# Patient Record
Sex: MALE | Race: WHITE | NOT HISPANIC OR LATINO | Employment: OTHER | URBAN - METROPOLITAN AREA
[De-identification: names, ages, dates, MRNs, and addresses within clinical notes are randomized per-mention and may not be internally consistent; named-entity substitution may affect disease eponyms.]

---

## 2017-12-15 ENCOUNTER — OFFICE VISIT (OUTPATIENT)
Dept: URGENT CARE | Facility: CLINIC | Age: 62
End: 2017-12-15
Payer: MEDICARE

## 2017-12-15 VITALS
HEIGHT: 72 IN | OXYGEN SATURATION: 96 % | WEIGHT: 308 LBS | HEART RATE: 96 BPM | BODY MASS INDEX: 41.72 KG/M2 | TEMPERATURE: 98 F | SYSTOLIC BLOOD PRESSURE: 147 MMHG | RESPIRATION RATE: 16 BRPM | DIASTOLIC BLOOD PRESSURE: 89 MMHG

## 2017-12-15 DIAGNOSIS — J40 BRONCHITIS: Primary | ICD-10-CM

## 2017-12-15 PROCEDURE — 96372 THER/PROPH/DIAG INJ SC/IM: CPT | Mod: S$GLB,,, | Performed by: EMERGENCY MEDICINE

## 2017-12-15 PROCEDURE — 99203 OFFICE O/P NEW LOW 30 MIN: CPT | Mod: 25,S$GLB,, | Performed by: NURSE PRACTITIONER

## 2017-12-15 RX ORDER — AZITHROMYCIN 250 MG/1
TABLET, FILM COATED ORAL
Qty: 6 TABLET | Refills: 0 | Status: SHIPPED | OUTPATIENT
Start: 2017-12-15 | End: 2018-03-27 | Stop reason: CLARIF

## 2017-12-15 RX ORDER — PENTOXIFYLLINE 400 MG/1
400 TABLET, EXTENDED RELEASE ORAL 2 TIMES DAILY
Status: ON HOLD | COMMUNITY
End: 2018-11-27 | Stop reason: HOSPADM

## 2017-12-15 RX ORDER — ASPIRIN 81 MG/1
81 TABLET ORAL DAILY
Status: ON HOLD | COMMUNITY
End: 2018-11-27 | Stop reason: HOSPADM

## 2017-12-15 RX ORDER — ATORVASTATIN CALCIUM 40 MG/1
40 TABLET, FILM COATED ORAL DAILY
COMMUNITY

## 2017-12-15 RX ORDER — BACLOFEN 10 MG/1
10 TABLET ORAL 3 TIMES DAILY
COMMUNITY
End: 2018-11-15 | Stop reason: CLARIF

## 2017-12-15 RX ORDER — LOSARTAN POTASSIUM 50 MG/1
50 TABLET ORAL DAILY
COMMUNITY

## 2017-12-15 RX ORDER — HYDROCODONE BITARTRATE AND ACETAMINOPHEN 10; 325 MG/1; MG/1
TABLET ORAL EVERY 6 HOURS PRN
Status: ON HOLD | COMMUNITY
End: 2018-11-27 | Stop reason: HOSPADM

## 2017-12-15 RX ORDER — POTASSIUM CHLORIDE 750 MG/1
20 CAPSULE, EXTENDED RELEASE ORAL ONCE
COMMUNITY

## 2017-12-15 RX ORDER — METOPROLOL SUCCINATE 50 MG/1
50 TABLET, EXTENDED RELEASE ORAL 2 TIMES DAILY
COMMUNITY

## 2017-12-15 RX ORDER — BETAMETHASONE SODIUM PHOSPHATE AND BETAMETHASONE ACETATE 3; 3 MG/ML; MG/ML
9 INJECTION, SUSPENSION INTRA-ARTICULAR; INTRALESIONAL; INTRAMUSCULAR; SOFT TISSUE
Status: COMPLETED | OUTPATIENT
Start: 2017-12-15 | End: 2017-12-15

## 2017-12-15 RX ORDER — HYDROXYCHLOROQUINE SULFATE 200 MG/1
200 TABLET, FILM COATED ORAL 2 TIMES DAILY
COMMUNITY
End: 2019-04-23

## 2017-12-15 RX ORDER — CLOPIDOGREL 300 MG/1
75 TABLET, FILM COATED ORAL ONCE
Status: ON HOLD | COMMUNITY
End: 2018-11-27 | Stop reason: HOSPADM

## 2017-12-15 RX ADMIN — BETAMETHASONE SODIUM PHOSPHATE AND BETAMETHASONE ACETATE 9 MG: 3; 3 INJECTION, SUSPENSION INTRA-ARTICULAR; INTRALESIONAL; INTRAMUSCULAR; SOFT TISSUE at 09:12

## 2017-12-16 NOTE — PATIENT INSTRUCTIONS
Please return here or go to the Emergency Department for any concerns or worsening of condition.  If you were prescribed antibiotics, please take them to completion.  If you were prescribed a narcotic medication, do not drive or operate heavy equipment or machinery while taking these medications.  Please follow up with your primary care doctor or specialist as needed.    If you  smoke, please stop smoking.  Bronchitis with Wheezing (Viral or Bacterial: Adult)    Bronchitis is an infection of the air passages. It often occurs during a cold and is usually caused by a virus. Symptoms include cough with mucus (phlegm) and low-grade fever. This illness is contagious during the first few days and is spread through the air by coughing and sneezing, or by direct contact (touching the sick person and then touching your own eyes, nose, or mouth).  If there is a lot of inflammation, air flow is restricted. The air passages may also go into spasm, especially if you have asthma. This causes wheezing and difficulty breathing even in people who do not have asthma.  Bronchitis usually lasts 7 to 14 days. The wheezing should improve with treatment during the first week. An inhaler is often prescribed to relax the air passages and stop wheezing. Antibiotics will be prescribed if your doctor thinks there is also a secondary bacterial infection.  Home care  · If symptoms are severe, rest at home for the first 2 to 3 days. When you go back to your usual activities, don't let yourself get too tired.  · Do not smoke. Also avoid being exposed to secondhand smoke.  · You may use over-the-counter medicine to control fever or pain, unless another medicine was prescribed. Note: If you have chronic liver or kidney disease or have ever had a stomach ulcer or gastrointestinal bleeding, talk with your healthcare provider before using these medicines. Also talk to your provider if you are taking medicine to prevent blood clots.) Aspirin should  never be given to anyone younger than 18 years of age who is ill with a viral infection or fever. It may cause severe liver or brain damage.  · Your appetite may be poor, so a light diet is fine. Avoid dehydration by drinking 6 to 8 glasses of fluids per day (such as water, soft drinks, sports drinks, juices, tea, or soup). Extra fluids will help loosen secretions in the nose and lungs.  · Over-the-counter cough, cold, and sore-throat medicines will not shorten the length of the illness, but they may be helpful to reduce symptoms. (Note: Do not use decongestants if you have high blood pressure.)  · If you were given an inhaler, use it exactly as directed. If you need to use it more often than prescribed, your condition may be worsening. If this happens, contact your healthcare provider.  · If prescribed, finish all antibiotic medicine, even if you are feeling better after only a few days.  Follow-up care  Follow up with your healthcare provider, or as advised. If you had an X-ray or ECG (electrocardiogram), a specialist will review it. You will be notified of any new findings that may affect your care.  Note: If you are age 65 or older, or if you have a chronic lung disease or condition that affects your immune system, or you smoke, talk to your healthcare provider about having a pneumococcal vaccinations and a yearly influenza vaccination (flu shot).  When to seek medical advice  Call your healthcare provider right away if any of these occur:  · Fever of 100.4°F (38°C) or higher  · Coughing up increasing amounts of colored sputum  · Weakness, drowsiness, headache, facial pain, ear pain, or a stiff neck  Call 911, or get immediate medical care  Contact emergency services right away if any of these occur.  · Coughing up blood  · Worsening weakness, drowsiness, headache, or stiff neck  · Increased wheezing not helped with medication, shortness of breath, or pain with breathing  Date Last Reviewed: 9/13/2015  ©  7736-7252 [employer]. [address] [place] [address], [place] [address]. All rights reserved. This information is not intended as a substitute for professional medical care. Always follow your healthcare professional's instructions.

## 2017-12-16 NOTE — PROGRESS NOTES
Subjective:       Patient ID: Facundo Lord is a 62 y.o. male.    Vitals:  height is 6' (1.829 m) and weight is 139.7 kg (308 lb) (abnormal). His oral temperature is 98.1 °F (36.7 °C). His blood pressure is 147/89 (abnormal) and his pulse is 96. His respiration is 16 and oxygen saturation is 96%.     Chief Complaint: Cough    Cough   This is a recurrent problem. The current episode started in the past 7 days. The problem has been gradually worsening. The cough is productive of sputum. Associated symptoms include wheezing. Pertinent negatives include no chest pain, chills, ear pain, eye redness, fever, headaches, myalgias, sore throat or shortness of breath. Risk factors for lung disease include smoking/tobacco exposure. He has tried OTC cough suppressant for the symptoms. The treatment provided mild relief. His past medical history is significant for COPD and emphysema.     Review of Systems   Constitution: Negative for chills, fever and malaise/fatigue.   HENT: Positive for congestion. Negative for ear pain, hoarse voice and sore throat.    Eyes: Negative for discharge and redness.   Cardiovascular: Negative for chest pain, dyspnea on exertion and leg swelling.   Respiratory: Positive for cough, sputum production and wheezing. Negative for shortness of breath.    Musculoskeletal: Negative for myalgias.   Gastrointestinal: Negative for abdominal pain and nausea.   Neurological: Negative for headaches.       Objective:      Physical Exam   Constitutional: He is oriented to person, place, and time. He appears well-developed and well-nourished. He is cooperative.  Non-toxic appearance. He does not appear ill. No distress.   HENT:   Head: Normocephalic and atraumatic.   Right Ear: Hearing, tympanic membrane, external ear and ear canal normal.   Left Ear: Hearing, tympanic membrane, external ear and ear canal normal.   Nose: Nose normal. No mucosal edema, rhinorrhea or nasal deformity. No epistaxis. Right sinus  exhibits no maxillary sinus tenderness and no frontal sinus tenderness. Left sinus exhibits no maxillary sinus tenderness and no frontal sinus tenderness.   Mouth/Throat: Uvula is midline, oropharynx is clear and moist and mucous membranes are normal. No trismus in the jaw. Normal dentition. No uvula swelling. No posterior oropharyngeal erythema.   Eyes: Conjunctivae and lids are normal. No scleral icterus.   Sclera clear bilat   Neck: Trachea normal, full passive range of motion without pain and phonation normal. Neck supple.   Cardiovascular: Normal rate, regular rhythm, normal heart sounds, intact distal pulses and normal pulses.    Pulmonary/Chest: Effort normal. No respiratory distress. He has wheezes in the right upper field, the right lower field, the left upper field and the left lower field.   Abdominal: Soft. Normal appearance and bowel sounds are normal. He exhibits no distension. There is no tenderness.   Musculoskeletal: Normal range of motion. He exhibits no edema or deformity.   Neurological: He is alert and oriented to person, place, and time. He exhibits normal muscle tone. Coordination normal.   Skin: Skin is warm, dry and intact. He is not diaphoretic. No pallor.   Psychiatric: He has a normal mood and affect. His speech is normal and behavior is normal. Judgment and thought content normal. Cognition and memory are normal.   Nursing note and vitals reviewed.      Assessment:       1. Bronchitis        Plan:         Bronchitis    Other orders  -     betamethasone acetate-betamethasone sodium phosphate injection 9 mg; Inject 1.5 mLs (9 mg total) into the muscle one time.  -     azithromycin (ZITHROMAX Z-AYALA) 250 MG tablet; TAKE 2 TABLETS ON DAY 1, THEN 1 TABLET DAILY X 4 DAYS.  Dispense: 6 tablet; Refill: 0      Patient Instructions   Please return here or go to the Emergency Department for any concerns or worsening of condition.  If you were prescribed antibiotics, please take them to  completion.  If you were prescribed a narcotic medication, do not drive or operate heavy equipment or machinery while taking these medications.  Please follow up with your primary care doctor or specialist as needed.    If you  smoke, please stop smoking.  Bronchitis with Wheezing (Viral or Bacterial: Adult)    Bronchitis is an infection of the air passages. It often occurs during a cold and is usually caused by a virus. Symptoms include cough with mucus (phlegm) and low-grade fever. This illness is contagious during the first few days and is spread through the air by coughing and sneezing, or by direct contact (touching the sick person and then touching your own eyes, nose, or mouth).  If there is a lot of inflammation, air flow is restricted. The air passages may also go into spasm, especially if you have asthma. This causes wheezing and difficulty breathing even in people who do not have asthma.  Bronchitis usually lasts 7 to 14 days. The wheezing should improve with treatment during the first week. An inhaler is often prescribed to relax the air passages and stop wheezing. Antibiotics will be prescribed if your doctor thinks there is also a secondary bacterial infection.  Home care  · If symptoms are severe, rest at home for the first 2 to 3 days. When you go back to your usual activities, don't let yourself get too tired.  · Do not smoke. Also avoid being exposed to secondhand smoke.  · You may use over-the-counter medicine to control fever or pain, unless another medicine was prescribed. Note: If you have chronic liver or kidney disease or have ever had a stomach ulcer or gastrointestinal bleeding, talk with your healthcare provider before using these medicines. Also talk to your provider if you are taking medicine to prevent blood clots.) Aspirin should never be given to anyone younger than 18 years of age who is ill with a viral infection or fever. It may cause severe liver or brain damage.  · Your appetite  may be poor, so a light diet is fine. Avoid dehydration by drinking 6 to 8 glasses of fluids per day (such as water, soft drinks, sports drinks, juices, tea, or soup). Extra fluids will help loosen secretions in the nose and lungs.  · Over-the-counter cough, cold, and sore-throat medicines will not shorten the length of the illness, but they may be helpful to reduce symptoms. (Note: Do not use decongestants if you have high blood pressure.)  · If you were given an inhaler, use it exactly as directed. If you need to use it more often than prescribed, your condition may be worsening. If this happens, contact your healthcare provider.  · If prescribed, finish all antibiotic medicine, even if you are feeling better after only a few days.  Follow-up care  Follow up with your healthcare provider, or as advised. If you had an X-ray or ECG (electrocardiogram), a specialist will review it. You will be notified of any new findings that may affect your care.  Note: If you are age 65 or older, or if you have a chronic lung disease or condition that affects your immune system, or you smoke, talk to your healthcare provider about having a pneumococcal vaccinations and a yearly influenza vaccination (flu shot).  When to seek medical advice  Call your healthcare provider right away if any of these occur:  · Fever of 100.4°F (38°C) or higher  · Coughing up increasing amounts of colored sputum  · Weakness, drowsiness, headache, facial pain, ear pain, or a stiff neck  Call 911, or get immediate medical care  Contact emergency services right away if any of these occur.  · Coughing up blood  · Worsening weakness, drowsiness, headache, or stiff neck  · Increased wheezing not helped with medication, shortness of breath, or pain with breathing  Date Last Reviewed: 9/13/2015  © 4353-8652 [employer]. [address] [place] [address], [place] [address]. All rights reserved. This information is not intended as a substitute for  professional medical care. Always follow your healthcare professional's instructions.

## 2018-03-27 ENCOUNTER — ANESTHESIA EVENT (OUTPATIENT)
Dept: SURGERY | Facility: OTHER | Age: 63
DRG: 554 | End: 2018-03-27
Payer: MEDICARE

## 2018-03-27 ENCOUNTER — HOSPITAL ENCOUNTER (OUTPATIENT)
Dept: PREADMISSION TESTING | Facility: OTHER | Age: 63
Discharge: HOME | End: 2018-03-27
Attending: ORTHOPAEDIC SURGERY
Payer: MEDICARE

## 2018-03-27 VITALS
HEART RATE: 68 BPM | SYSTOLIC BLOOD PRESSURE: 124 MMHG | DIASTOLIC BLOOD PRESSURE: 73 MMHG | HEIGHT: 71 IN | OXYGEN SATURATION: 98 % | WEIGHT: 308 LBS | RESPIRATION RATE: 16 BRPM | TEMPERATURE: 99 F | BODY MASS INDEX: 43.12 KG/M2

## 2018-03-27 DIAGNOSIS — M16.11 PRIMARY OSTEOARTHRITIS OF RIGHT HIP: Primary | ICD-10-CM

## 2018-03-27 LAB
ABO + RH BLD: NORMAL
ALBUMIN SERPL BCP-MCNC: 3.5 G/DL
ALP SERPL-CCNC: 91 U/L
ALT SERPL W/O P-5'-P-CCNC: 17 U/L
ANION GAP SERPL CALC-SCNC: 10 MMOL/L
AST SERPL-CCNC: 15 U/L
BASOPHILS # BLD AUTO: 0.02 K/UL
BASOPHILS NFR BLD: 0.2 %
BILIRUB SERPL-MCNC: 0.7 MG/DL
BILIRUB UR QL STRIP: NEGATIVE
BLD GP AB SCN CELLS X3 SERPL QL: NORMAL
BUN SERPL-MCNC: 17 MG/DL
CALCIUM SERPL-MCNC: 10.1 MG/DL
CHLORIDE SERPL-SCNC: 106 MMOL/L
CLARITY UR: CLEAR
CO2 SERPL-SCNC: 25 MMOL/L
COLOR UR: YELLOW
CREAT SERPL-MCNC: 1 MG/DL
DIFFERENTIAL METHOD: ABNORMAL
EOSINOPHIL # BLD AUTO: 0.2 K/UL
EOSINOPHIL NFR BLD: 2.2 %
ERYTHROCYTE [DISTWIDTH] IN BLOOD BY AUTOMATED COUNT: 14.8 %
EST. GFR  (AFRICAN AMERICAN): >60 ML/MIN/1.73 M^2
EST. GFR  (NON AFRICAN AMERICAN): >60 ML/MIN/1.73 M^2
GLUCOSE SERPL-MCNC: 86 MG/DL
GLUCOSE UR QL STRIP: NEGATIVE
HCT VFR BLD AUTO: 46.4 %
HGB BLD-MCNC: 15.1 G/DL
HGB UR QL STRIP: NEGATIVE
KETONES UR QL STRIP: NEGATIVE
LEUKOCYTE ESTERASE UR QL STRIP: NEGATIVE
LYMPHOCYTES # BLD AUTO: 1.4 K/UL
LYMPHOCYTES NFR BLD: 16.8 %
MCH RBC QN AUTO: 29.7 PG
MCHC RBC AUTO-ENTMCNC: 32.5 G/DL
MCV RBC AUTO: 91 FL
MONOCYTES # BLD AUTO: 0.6 K/UL
MONOCYTES NFR BLD: 6.4 %
NEUTROPHILS # BLD AUTO: 6.3 K/UL
NEUTROPHILS NFR BLD: 74 %
NITRITE UR QL STRIP: NEGATIVE
PH UR STRIP: 6 [PH] (ref 5–8)
PLATELET # BLD AUTO: 317 K/UL
PMV BLD AUTO: 8.7 FL
POTASSIUM SERPL-SCNC: 4.3 MMOL/L
PROT SERPL-MCNC: 7.5 G/DL
PROT UR QL STRIP: NEGATIVE
RBC # BLD AUTO: 5.09 M/UL
SODIUM SERPL-SCNC: 141 MMOL/L
SP GR UR STRIP: 1.02 (ref 1–1.03)
URN SPEC COLLECT METH UR: NORMAL
UROBILINOGEN UR STRIP-ACNC: NEGATIVE EU/DL
WBC # BLD AUTO: 8.57 K/UL

## 2018-03-27 PROCEDURE — 81003 URINALYSIS AUTO W/O SCOPE: CPT

## 2018-03-27 PROCEDURE — 93010 ELECTROCARDIOGRAM REPORT: CPT | Mod: ,,, | Performed by: INTERNAL MEDICINE

## 2018-03-27 PROCEDURE — 86850 RBC ANTIBODY SCREEN: CPT

## 2018-03-27 PROCEDURE — 80053 COMPREHEN METABOLIC PANEL: CPT

## 2018-03-27 PROCEDURE — 85025 COMPLETE CBC W/AUTO DIFF WBC: CPT

## 2018-03-27 PROCEDURE — 93005 ELECTROCARDIOGRAM TRACING: CPT

## 2018-03-27 PROCEDURE — 87086 URINE CULTURE/COLONY COUNT: CPT

## 2018-03-27 PROCEDURE — 36415 COLL VENOUS BLD VENIPUNCTURE: CPT

## 2018-03-27 RX ORDER — SODIUM CHLORIDE, SODIUM LACTATE, POTASSIUM CHLORIDE, CALCIUM CHLORIDE 600; 310; 30; 20 MG/100ML; MG/100ML; MG/100ML; MG/100ML
INJECTION, SOLUTION INTRAVENOUS CONTINUOUS
Status: CANCELLED | OUTPATIENT
Start: 2018-03-27

## 2018-03-27 RX ORDER — METHOTREXATE 2.5 MG/1
TABLET ORAL
COMMUNITY
End: 2018-11-15 | Stop reason: CLARIF

## 2018-03-27 RX ORDER — LIDOCAINE HYDROCHLORIDE 10 MG/ML
0.5 INJECTION, SOLUTION EPIDURAL; INFILTRATION; INTRACAUDAL; PERINEURAL ONCE
Status: CANCELLED | OUTPATIENT
Start: 2018-03-27 | End: 2018-03-27

## 2018-03-27 RX ORDER — FOLIC ACID 1 MG/1
1 TABLET ORAL DAILY
COMMUNITY

## 2018-03-27 NOTE — DISCHARGE INSTRUCTIONS
PRE-ADMIT TESTING -  [phone]    [address]  [facility]          Your surgery has been scheduled at [facility]. We are pleased to have the opportunity to serve you. For Further Information please call [phone].    On the day of surgery please report to the Information Desk on the 1st floor.    · CONTACT YOUR PHYSICIAN'S OFFICE THE DAY PRIOR TO YOUR SURGERY TO OBTAIN YOUR ARRIVAL TIME.     · The evening before surgery do not eat anything after 9 p.m. ( this includes hard candy, chewing gum and mints).  You may only have GATORADE, POWERADE AND WATER  from 9 p.m. until you leave your home.   DO NOT DRINK ANY LIQUIDS ON THE WAY TO THE HOSPITAL.      SPECIAL MEDICATION INSTRUCTIONS: TAKE medications checked off by the Anesthesiologist on your Medication List.    Angiogram Patients: Take medications as instructed by your physician, including aspirin.     Surgery Patients:    If you take ASPIRIN - Your PHYSICIAN/SURGEON will need to inform you IF/OR when you need to stop taking aspirin prior to your surgery.     Do Not take any medications containing IBUPROFEN.  Do Not Wear any make-up or dark nail polish   (especially eye make-up) to surgery. If you come to surgery with makeup on you will be required to remove the makeup or nail polish.    Do not shave your surgical area at least 5 days prior to your surgery. The surgical prep will be performed at the hospital according to Infection Control regulations.    Leave all valuables at home.   Do Not wear any jewelry or watches, including any metal in body piercings.  Contact Lens must be removed before surgery. Either do not wear the contact lens or bring a case and solution for storage.  Please bring a container for eyeglasses or dentures as required.  Bring any paperwork your physician has provided, such as consent forms,  history and physicals, doctor's orders, etc.   Bring comfortable clothes that are loose fitting to wear upon  discharge. Take into consideration the type of surgery being performed.  Maintain your diet as advised per your physician the day prior to surgery.      Adequate rest the night before surgery is advised.   Park in the Parking lot behind the hospital or in the [facility] across the street from the parking lot. Parking is complimentary.  If you will be discharged the same day as your procedure, please arrange for a responsible adult to drive you home or to accompany you if traveling by taxi.   YOU WILL NOT BE PERMITTED TO DRIVE OR TO LEAVE THE HOSPITAL ALONE AFTER SURGERY.   It is strongly recommended that you arrange for someone to remain with you for the first 24 hrs following your surgery.       Thank you for your cooperation.  The Staff of [facility].        Bathing Instructions                                                                 Please shower the evening before and morning of your procedure with    ANTIBACTERIAL SOAP. ( DIAL, etc )  Concentrate on the surgical area   for at least 3 minutes and rinse completely. Dry off as usual.   Do not use any deodorant, powder, body lotions, perfume, after shave or    cologne.

## 2018-03-27 NOTE — ANESTHESIA PREPROCEDURE EVALUATION
03/27/2018  Facundo Lord Jr. is a 62 y.o., male.    Pre-op Assessment    I have reviewed the Patient Summary Reports.     I have reviewed the Nursing Notes.   I have reviewed the Medications.     Review of Systems  Anesthesia Hx:  Denies Family Hx of Anesthesia complications.   Denies Personal Hx of Anesthesia complications.   Social:  Smoker    Hematology/Oncology:  Hematology Normal   Oncology Normal     EENT/Dental:EENT/Dental Normal   Cardiovascular:   Pacemaker (AICD medtronic) Hypertension Denies MI. CAD  asymptomatic  CHF (most recent EF 45%) Orthopnea (2-3 pillows)    Pulmonary:   COPD, moderate Sedentary with arthritis, unknown exercise tolerance   Renal/:  Renal/ Normal     Hepatic/GI:  Hepatic/GI Normal    Musculoskeletal:   Arthritis (RA)     Neurological:  Neurology Normal    Endocrine:  Endocrine Normal    Dermatological:  Skin Normal    Psych:  Psychiatric Normal           Physical Exam  General:  Morbid Obesity      Dental:  Dental Findings: In tact        Mental Status:  Mental Status Findings:  Cooperative, Alert and Oriented         Anesthesia Plan  Type of Anesthesia, risks & benefits discussed:  Anesthesia Type:  spinal  Patient's Preference:   Intra-op Monitoring Plan: standard ASA monitors  Intra-op Monitoring Plan Comments:   Post Op Pain Control Plan:   Post Op Pain Control Plan Comments:   Induction:    Beta Blocker:         Informed Consent: Patient understands risks and agrees with Anesthesia plan.  Questions answered. Anesthesia consent signed with patient.  ASA Score: 3     Day of Surgery Review of History & Physical:    H&P update referred to the surgeon.     Anesthesia Plan Notes: EKG/labs today  Pt to check with cardiologist regarding holding plavix/trental 7 days

## 2018-03-28 NOTE — PRE ADMISSION SCREENING
Per DR Ahumada- [employer] notified of surgery to turn off before surgery and interrogate after surgery.

## 2018-03-29 LAB — BACTERIA UR CULT: NO GROWTH

## 2018-04-02 NOTE — PRE ADMISSION SCREENING
Spoke with [employer] rep- Salvatore. States OK to put magnet over AICD pre-op and call him for post-op. He will come

## 2018-04-09 ENCOUNTER — ANESTHESIA (OUTPATIENT)
Dept: SURGERY | Facility: OTHER | Age: 63
DRG: 554 | End: 2018-04-09
Payer: MEDICARE

## 2018-04-09 ENCOUNTER — SURGERY (OUTPATIENT)
Age: 63
End: 2018-04-09

## 2018-04-09 ENCOUNTER — HOSPITAL ENCOUNTER (INPATIENT)
Facility: OTHER | Age: 63
LOS: 1 days | Discharge: HOME | DRG: 554 | End: 2018-04-09
Attending: ORTHOPAEDIC SURGERY | Admitting: ORTHOPAEDIC SURGERY
Payer: MEDICARE

## 2018-04-09 VITALS
HEIGHT: 71 IN | BODY MASS INDEX: 43.12 KG/M2 | DIASTOLIC BLOOD PRESSURE: 83 MMHG | SYSTOLIC BLOOD PRESSURE: 145 MMHG | TEMPERATURE: 98 F | WEIGHT: 308 LBS | HEART RATE: 61 BPM | OXYGEN SATURATION: 97 % | RESPIRATION RATE: 18 BRPM

## 2018-04-09 DIAGNOSIS — M16.11 PRIMARY OSTEOARTHRITIS OF RIGHT HIP: Primary | ICD-10-CM

## 2018-04-09 PROCEDURE — 12000002 HC ACUTE/MED SURGE SEMI-PRIVATE ROOM

## 2018-04-09 PROCEDURE — 94640 AIRWAY INHALATION TREATMENT: CPT

## 2018-04-09 PROCEDURE — 94761 N-INVAS EAR/PLS OXIMETRY MLT: CPT

## 2018-04-09 PROCEDURE — 25000242 PHARM REV CODE 250 ALT 637 W/ HCPCS: Performed by: ANESTHESIOLOGY

## 2018-04-09 RX ORDER — ALBUTEROL SULFATE 0.83 MG/ML
2.5 SOLUTION RESPIRATORY (INHALATION)
Status: COMPLETED | OUTPATIENT
Start: 2018-04-09 | End: 2018-04-09

## 2018-04-09 RX ORDER — ACETAMINOPHEN 10 MG/ML
1000 INJECTION, SOLUTION INTRAVENOUS
Status: ACTIVE | OUTPATIENT
Start: 2018-04-09 | End: 2018-04-10

## 2018-04-09 RX ORDER — SODIUM CHLORIDE, SODIUM LACTATE, POTASSIUM CHLORIDE, CALCIUM CHLORIDE 600; 310; 30; 20 MG/100ML; MG/100ML; MG/100ML; MG/100ML
INJECTION, SOLUTION INTRAVENOUS CONTINUOUS
Status: DISCONTINUED | OUTPATIENT
Start: 2018-04-09 | End: 2018-04-10 | Stop reason: HOSPADM

## 2018-04-09 RX ORDER — CEFAZOLIN SODIUM 1 G/3ML
3 INJECTION, POWDER, FOR SOLUTION INTRAMUSCULAR; INTRAVENOUS
Status: ACTIVE | OUTPATIENT
Start: 2018-04-09 | End: 2018-04-10

## 2018-04-09 RX ORDER — LIDOCAINE HYDROCHLORIDE 10 MG/ML
0.5 INJECTION INFILTRATION; PERINEURAL ONCE
Status: DISCONTINUED | OUTPATIENT
Start: 2018-04-09 | End: 2018-04-10 | Stop reason: HOSPADM

## 2018-04-09 RX ADMIN — ALBUTEROL SULFATE 2.5 MG: 2.5 SOLUTION RESPIRATORY (INHALATION) at 12:04

## 2018-04-09 NOTE — OR NURSING
Transferred to ACU .  Phone number given to pt by Dr Alejandra.  He needs to call Dr Womack or Castro to evaluate his sore on his lt hip.

## 2018-04-09 NOTE — OR NURSING
"Notified Dr. Alejandra of pt's Left buttocks abcess, no drainage noted, pt denies fevers; pt reports "boil" has been there for a year.  "

## 2018-04-09 NOTE — OR NURSING
Back to 4th floor.  Patient instructed to call Cristóbal Erazo or Shanta to assess the area on left buttock.  Patient's surgery cancelled and patient and family aware.

## 2018-04-09 NOTE — NURSING
Total Joint Replacement Questionnaire     Facundo Lord Jr. participated in Joint Class April 7    1. Have you ever had a Joint Replacement?   []Yes  [x] No    2. How many stairs/steps do you have to enter your home? 0  When going up, on what side is the railing?  [] Left  [] Right  [] Bilateral  [x] None    3. Do you own any Durable Medical Equipment?   [] Rolling Walker  [] Standard Walker  [] Rollator  [x] Cane  [] Crutches  [] Bed Side Commode  [] Hip Kit  [] Tub Transfer Bench  [] Shower Chair     4. Have you used a Home Health Company before?   [] Yes  [x] No  If Yes, Name of Company: [employer]  Would you like to use this company again?   [] Yes  [] No  [x] Would you like to use your physician's preference?  [x] Yes  [] No    5. Have you arranged for someone to help you, for at least for 3 days, when you are discharged from the hospital?  [x] Yes  [] No  If Yes, Name(s) of person assisting: Spouse,Karen      Bea Kahn  4/9/2018

## 2018-04-09 NOTE — OR NURSING
Dr Rhoades here to speak with pt concerning abcess on lt buttock.  Dr Alejandra shown picture of buttock.  Will cancel surgery.

## 2018-04-13 NOTE — PROGRESS NOTES
The patient's surgery was cancelled due to a buttock abscess.  He was referred to a general surgeon for treatment and his surgery will be resheduled.

## 2018-05-02 ENCOUNTER — HOSPITAL ENCOUNTER (OUTPATIENT)
Dept: PREADMISSION TESTING | Facility: OTHER | Age: 63
Discharge: HOME | End: 2018-05-02
Attending: SPECIALIST
Payer: MEDICARE

## 2018-05-02 ENCOUNTER — ANESTHESIA EVENT (OUTPATIENT)
Dept: SURGERY | Facility: OTHER | Age: 63
End: 2018-05-02
Payer: MEDICARE

## 2018-05-02 VITALS
WEIGHT: 315 LBS | OXYGEN SATURATION: 99 % | BODY MASS INDEX: 42.66 KG/M2 | HEART RATE: 95 BPM | TEMPERATURE: 99 F | HEIGHT: 72 IN | DIASTOLIC BLOOD PRESSURE: 79 MMHG | SYSTOLIC BLOOD PRESSURE: 137 MMHG

## 2018-05-02 RX ORDER — ALBUTEROL SULFATE 0.83 MG/ML
2.5 SOLUTION RESPIRATORY (INHALATION)
Status: CANCELLED | OUTPATIENT
Start: 2018-05-02 | End: 2018-05-02

## 2018-05-02 RX ORDER — SODIUM CHLORIDE, SODIUM LACTATE, POTASSIUM CHLORIDE, CALCIUM CHLORIDE 600; 310; 30; 20 MG/100ML; MG/100ML; MG/100ML; MG/100ML
INJECTION, SOLUTION INTRAVENOUS CONTINUOUS
Status: CANCELLED | OUTPATIENT
Start: 2018-05-02

## 2018-05-02 NOTE — ANESTHESIA PREPROCEDURE EVALUATION
05/02/2018  Facundo Lord Jr. is a 62 y.o., male.    Anesthesia Evaluation    I have reviewed the Patient Summary Reports.    I have reviewed the Nursing Notes.   I have reviewed the Medications.     Review of Systems  Anesthesia Hx:  Denies Family Hx of Anesthesia complications.   Denies Personal Hx of Anesthesia complications.   Social:  Smoker    Hematology/Oncology:  Hematology Normal   Oncology Normal     EENT/Dental:EENT/Dental Normal   Cardiovascular:   Pacemaker (AICD medtronic) Hypertension Denies MI. CAD  asymptomatic  CHF (most recent EF 45%) Orthopnea (2-3 pillows)    Pulmonary:   COPD, moderate Sedentary with arthritis, unknown exercise tolerance   Renal/:  Renal/ Normal     Hepatic/GI:  Hepatic/GI Normal    Musculoskeletal:   Arthritis (RA)     Neurological:  Neurology Normal    Endocrine:  Endocrine Normal    Dermatological:  Skin Normal    Psych:  Psychiatric Normal           Physical Exam  General:  Morbid Obesity      Dental:  Dental Findings: In tact        Mental Status:  Mental Status Findings:  Cooperative, Alert and Oriented         Anesthesia Plan  Type of Anesthesia, risks & benefits discussed:  Anesthesia Type:  general  Patient's Preference:   Intra-op Monitoring Plan: standard ASA monitors  Intra-op Monitoring Plan Comments:   Post Op Pain Control Plan:   Post Op Pain Control Plan Comments:   Induction:    Beta Blocker:         Informed Consent: Patient understands risks and agrees with Anesthesia plan.  Questions answered. Anesthesia consent signed with patient.  ASA Score: 3     Day of Surgery Review of History & Physical:    H&P update referred to the surgeon.     Anesthesia Plan Notes:   Pt to check with cardiologist regarding holding plavix/trental 7 days    Patient here a month ago for BLANCA by Dr. Alejandra but cancelled due to the boil on buttocks.        Ready For  Surgery From Anesthesia Perspective.

## 2018-05-02 NOTE — DISCHARGE INSTRUCTIONS
PRE-ADMIT TESTING -  [phone]    [address]  [place] [facility]          Your surgery has been scheduled at [facility]. We are pleased to have the opportunity to serve you. For Further Information please call [phone].    On the day of surgery please report to the Information Desk on the 1st floor.    · CONTACT YOUR PHYSICIAN'S OFFICE THE DAY PRIOR TO YOUR SURGERY TO OBTAIN YOUR ARRIVAL TIME.     · The evening before surgery do not eat anything after 9 p.m. ( this includes hard candy, chewing gum and mints).  You may only have GATORADE, POWERADE AND WATER  from 9 p.m. until you leave your home.   DO NOT DRINK ANY LIQUIDS ON THE WAY TO THE HOSPITAL.      SPECIAL MEDICATION INSTRUCTIONS: TAKE medications checked off by the Anesthesiologist on your Medication List.    Angiogram Patients: Take medications as instructed by your physician, including aspirin.     Surgery Patients:    If you take ASPIRIN - Your PHYSICIAN/SURGEON will need to inform you IF/OR when you need to stop taking aspirin prior to your surgery.     Do Not take any medications containing IBUPROFEN.  Do Not Wear any make-up or dark nail polish   (especially eye make-up) to surgery. If you come to surgery with makeup on you will be required to remove the makeup or nail polish.    Do not shave your surgical area at least 5 days prior to your surgery. The surgical prep will be performed at the hospital according to Infection Control regulations.    Leave all valuables at home.   Do Not wear any jewelry or watches, including any metal in body piercings.  Contact Lens must be removed before surgery. Either do not wear the contact lens or bring a case and solution for storage.  Please bring a container for eyeglasses or dentures as required.  Bring any paperwork your physician has provided, such as consent forms,  history and physicals, doctor's orders, etc.   Bring comfortable clothes that are loose fitting to wear upon  discharge. Take into consideration the type of surgery being performed.  Maintain your diet as advised per your physician the day prior to surgery.      Adequate rest the night before surgery is advised.   Park in the Parking lot behind the hospital or in the [facility] across the street from the parking lot. Parking is complimentary.  If you will be discharged the same day as your procedure, please arrange for a responsible adult to drive you home or to accompany you if traveling by taxi.   YOU WILL NOT BE PERMITTED TO DRIVE OR TO LEAVE THE HOSPITAL ALONE AFTER SURGERY.   It is strongly recommended that you arrange for someone to remain with you for the first 24 hrs following your surgery.       Thank you for your cooperation.  The Staff of [facility].        Bathing Instructions                                                                 Please shower the evening before and morning of your procedure with    ANTIBACTERIAL SOAP. ( DIAL, etc )  Concentrate on the surgical area   for at least 3 minutes and rinse completely. Dry off as usual.   Do not use any deodorant, powder, body lotions, perfume, after shave or    cologne.

## 2018-05-04 ENCOUNTER — ANESTHESIA (OUTPATIENT)
Dept: SURGERY | Facility: OTHER | Age: 63
End: 2018-05-04
Payer: MEDICARE

## 2018-05-04 ENCOUNTER — SURGERY (OUTPATIENT)
Age: 63
End: 2018-05-04

## 2018-05-04 ENCOUNTER — HOSPITAL ENCOUNTER (OUTPATIENT)
Facility: OTHER | Age: 63
Discharge: HOME | End: 2018-05-04
Attending: SPECIALIST | Admitting: SPECIALIST
Payer: MEDICARE

## 2018-05-04 VITALS
TEMPERATURE: 98 F | SYSTOLIC BLOOD PRESSURE: 118 MMHG | WEIGHT: 315 LBS | DIASTOLIC BLOOD PRESSURE: 66 MMHG | OXYGEN SATURATION: 99 % | RESPIRATION RATE: 18 BRPM | BODY MASS INDEX: 42.66 KG/M2 | HEART RATE: 61 BPM | HEIGHT: 72 IN

## 2018-05-04 DIAGNOSIS — S30.820A BLISTER OF BUTTOCK, INITIAL ENCOUNTER: Primary | ICD-10-CM

## 2018-05-04 PROCEDURE — 25000003 PHARM REV CODE 250: Performed by: SPECIALIST

## 2018-05-04 PROCEDURE — 71000015 HC POSTOP RECOV 1ST HR: Performed by: SPECIALIST

## 2018-05-04 PROCEDURE — 88307 TISSUE EXAM BY PATHOLOGIST: CPT | Performed by: PATHOLOGY

## 2018-05-04 PROCEDURE — 25000242 PHARM REV CODE 250 ALT 637 W/ HCPCS: Performed by: ANESTHESIOLOGY

## 2018-05-04 PROCEDURE — 37000008 HC ANESTHESIA 1ST 15 MINUTES: Performed by: SPECIALIST

## 2018-05-04 PROCEDURE — 36000707: Performed by: SPECIALIST

## 2018-05-04 PROCEDURE — 88307 TISSUE EXAM BY PATHOLOGIST: CPT | Mod: 26,,, | Performed by: PATHOLOGY

## 2018-05-04 PROCEDURE — 25000003 PHARM REV CODE 250: Performed by: ANESTHESIOLOGY

## 2018-05-04 PROCEDURE — 37000009 HC ANESTHESIA EA ADD 15 MINS: Performed by: SPECIALIST

## 2018-05-04 PROCEDURE — 36000706: Performed by: SPECIALIST

## 2018-05-04 PROCEDURE — 94761 N-INVAS EAR/PLS OXIMETRY MLT: CPT

## 2018-05-04 PROCEDURE — 63600175 PHARM REV CODE 636 W HCPCS: Performed by: NURSE ANESTHETIST, CERTIFIED REGISTERED

## 2018-05-04 PROCEDURE — 63600175 PHARM REV CODE 636 W HCPCS: Performed by: SPECIALIST

## 2018-05-04 PROCEDURE — 94640 AIRWAY INHALATION TREATMENT: CPT

## 2018-05-04 RX ORDER — ALBUTEROL SULFATE 0.83 MG/ML
2.5 SOLUTION RESPIRATORY (INHALATION)
Status: COMPLETED | OUTPATIENT
Start: 2018-05-04 | End: 2018-05-04

## 2018-05-04 RX ORDER — ACETAMINOPHEN 10 MG/ML
INJECTION, SOLUTION INTRAVENOUS
Status: DISCONTINUED | OUTPATIENT
Start: 2018-05-04 | End: 2018-05-04

## 2018-05-04 RX ORDER — OXYCODONE HYDROCHLORIDE 5 MG/1
5 TABLET ORAL
Status: DISCONTINUED | OUTPATIENT
Start: 2018-05-04 | End: 2018-05-04 | Stop reason: HOSPADM

## 2018-05-04 RX ORDER — FENTANYL CITRATE 50 UG/ML
25 INJECTION, SOLUTION INTRAMUSCULAR; INTRAVENOUS EVERY 5 MIN PRN
Status: DISCONTINUED | OUTPATIENT
Start: 2018-05-04 | End: 2018-05-04 | Stop reason: HOSPADM

## 2018-05-04 RX ORDER — SODIUM CHLORIDE 0.9 % (FLUSH) 0.9 %
3 SYRINGE (ML) INJECTION
Status: DISCONTINUED | OUTPATIENT
Start: 2018-05-04 | End: 2018-05-04 | Stop reason: HOSPADM

## 2018-05-04 RX ORDER — SODIUM CHLORIDE 9 MG/ML
INJECTION, SOLUTION INTRAVENOUS CONTINUOUS
Status: DISCONTINUED | OUTPATIENT
Start: 2018-05-04 | End: 2018-05-04 | Stop reason: HOSPADM

## 2018-05-04 RX ORDER — ONDANSETRON HYDROCHLORIDE 2 MG/ML
INJECTION, SOLUTION INTRAMUSCULAR; INTRAVENOUS
Status: DISCONTINUED | OUTPATIENT
Start: 2018-05-04 | End: 2018-05-04

## 2018-05-04 RX ORDER — SODIUM CHLORIDE, SODIUM LACTATE, POTASSIUM CHLORIDE, CALCIUM CHLORIDE 600; 310; 30; 20 MG/100ML; MG/100ML; MG/100ML; MG/100ML
INJECTION, SOLUTION INTRAVENOUS CONTINUOUS
Status: DISCONTINUED | OUTPATIENT
Start: 2018-05-04 | End: 2018-05-04 | Stop reason: HOSPADM

## 2018-05-04 RX ORDER — HYDROCODONE BITARTRATE AND ACETAMINOPHEN 5; 325 MG/1; MG/1
TABLET ORAL
Qty: 20 TABLET | Refills: 0 | Status: SHIPPED | OUTPATIENT
Start: 2018-05-04 | End: 2018-11-15 | Stop reason: CLARIF

## 2018-05-04 RX ORDER — ONDANSETRON 2 MG/ML
4 INJECTION INTRAMUSCULAR; INTRAVENOUS DAILY PRN
Status: DISCONTINUED | OUTPATIENT
Start: 2018-05-04 | End: 2018-05-04 | Stop reason: HOSPADM

## 2018-05-04 RX ORDER — MIDAZOLAM HYDROCHLORIDE 1 MG/ML
INJECTION INTRAMUSCULAR; INTRAVENOUS
Status: DISCONTINUED | OUTPATIENT
Start: 2018-05-04 | End: 2018-05-04

## 2018-05-04 RX ORDER — CEFAZOLIN SODIUM 1 G/3ML
1 INJECTION, POWDER, FOR SOLUTION INTRAMUSCULAR; INTRAVENOUS
Status: DISCONTINUED | OUTPATIENT
Start: 2018-05-04 | End: 2018-05-04 | Stop reason: HOSPADM

## 2018-05-04 RX ORDER — FENTANYL CITRATE 50 UG/ML
INJECTION, SOLUTION INTRAMUSCULAR; INTRAVENOUS
Status: DISCONTINUED | OUTPATIENT
Start: 2018-05-04 | End: 2018-05-04

## 2018-05-04 RX ORDER — MEPERIDINE HYDROCHLORIDE 50 MG/ML
12.5 INJECTION INTRAMUSCULAR; INTRAVENOUS; SUBCUTANEOUS ONCE AS NEEDED
Status: DISCONTINUED | OUTPATIENT
Start: 2018-05-04 | End: 2018-05-04 | Stop reason: HOSPADM

## 2018-05-04 RX ORDER — HYDROCODONE BITARTRATE AND ACETAMINOPHEN 5; 325 MG/1; MG/1
1 TABLET ORAL EVERY 4 HOURS PRN
Status: DISCONTINUED | OUTPATIENT
Start: 2018-05-04 | End: 2018-05-04 | Stop reason: HOSPADM

## 2018-05-04 RX ADMIN — FENTANYL CITRATE 50 MCG: 50 INJECTION, SOLUTION INTRAMUSCULAR; INTRAVENOUS at 11:05

## 2018-05-04 RX ADMIN — ACETAMINOPHEN 1000 MG: 10 INJECTION, SOLUTION INTRAVENOUS at 11:05

## 2018-05-04 RX ADMIN — ALBUTEROL SULFATE 2.5 MG: 2.5 SOLUTION RESPIRATORY (INHALATION) at 09:05

## 2018-05-04 RX ADMIN — SODIUM CHLORIDE, SODIUM LACTATE, POTASSIUM CHLORIDE, AND CALCIUM CHLORIDE: 600; 310; 30; 20 INJECTION, SOLUTION INTRAVENOUS at 10:05

## 2018-05-04 RX ADMIN — CEFAZOLIN 2 G: 330 INJECTION, POWDER, FOR SOLUTION INTRAMUSCULAR; INTRAVENOUS at 11:05

## 2018-05-04 RX ADMIN — LIDOCAINE HYDROCHLORIDE 20 ML: 10; .005 INJECTION, SOLUTION EPIDURAL; INFILTRATION; INTRACAUDAL; PERINEURAL at 11:05

## 2018-05-04 RX ADMIN — ONDANSETRON 4 MG: 2 INJECTION, SOLUTION INTRAMUSCULAR; INTRAVENOUS at 11:05

## 2018-05-04 RX ADMIN — MIDAZOLAM HYDROCHLORIDE 2 MG: 1 INJECTION, SOLUTION INTRAMUSCULAR; INTRAVENOUS at 10:05

## 2018-05-04 NOTE — OP NOTE
DATE OF PROCEDURE:  05/04/2018.    PREOPERATIVE DIAGNOSIS:  Chronic left buttocks infection.    POSTOPERATIVE DIAGNOSIS:  Chronic left buttocks infection.    PROCEDURE:  Wide excision of chronic inflammation.    PROCEDURE IN DETAILS:  The patient was taken to the Operating Room, placed on   the table in the prone position.  After adequate IV sedation, the area around   the lesion was anesthetized using 1% Xylocaine with epinephrine.  An elliptical   incision was made, carried down into normal appearing fatty tissue.  The   affected area was then widely excised using electrocautery.  This was carried   deep to the process.  The specimen was removed and sent to Pathology for   appropriate studies.  There was no gross purulent fluid encountered.  The wound   was irrigated.  The hemostasis was obtained using electrocautery.  The wound was   then closed using 3-0 Vicryl followed by 4-0 nylon on the skin.  Sterile   dressing was applied.  Blood loss was minimal.  The patient tolerated the   procedure and left the Operating Room in stable condition.      SHANT/CLAYTON  dd: 05/04/2018 12:20:24 (CDT)  td: 05/04/2018 14:02:42 (XIOMARA)  Doc ID   #[id number]  Job ID #[id number]    CC:

## 2018-05-04 NOTE — ANESTHESIA POSTPROCEDURE EVALUATION
Anesthesia Post Evaluation    Patient: Facundo Lord Jr.    Procedure(s) Performed: Procedure(s) (LRB):  EXCISION-MASS BUTTOCK (Left)    Final Anesthesia Type: MAC  Patient location during evaluation: [facility]  Patient participation: Yes- Able to Participate  Level of consciousness: awake and alert, awake and oriented  Post-procedure vital signs: reviewed and stable  Pain management: adequate  Airway patency: patent  PONV status at discharge: No PONV  Anesthetic complications: no      Cardiovascular status: blood pressure returned to baseline, hemodynamically stable and stable  Respiratory status: unassisted, spontaneous ventilation and room air  Hydration status: euvolemic  Follow-up not needed.        Visit Vitals  /67 (BP Location: Left arm, Patient Position: Lying)   Pulse 84   Temp 36.7 °C (98 °F) (Oral)   Resp 18   Ht 6' (1.829 m)   Wt (!) 144.2 kg (317 lb 15.9 oz)   SpO2 (!) 94%   BMI 43.13 kg/m²       Pain/Carla Score: Pain Assessment Performed: Yes (5/4/2018  9:15 AM)  Presence of Pain: complains of pain/discomfort (5/4/2018  9:15 AM)

## 2018-05-04 NOTE — DISCHARGE INSTRUCTIONS
Anesthesia: Monitored Anesthesia Care (MAC)    Youre due to have surgery. During surgery, youll be given medicine called anesthesia. This will keep you comfortable and pain-free. Your surgeon will use monitored anesthesia care (MAC). This sheet tells you more about this type of anesthesia.  What is monitored anesthesia care?  MAC keeps you very drowsy during surgery. You may be awake, but you will likely not remember much. And you wont feel pain. With MAC, medicines are given through an IV line into a vein in your arm or hand. A local anesthetic will usually be injected into the skin and muscle around the surgical site to numb it. The anesthesia provider monitors you during the procedure. He or she checks your heart rate and rhythm, blood pressure, and blood oxygen level.  Anesthesia tools and medicines that may be near you during your procedure  You will likely have:  · A pulse oximeter on the end of your finger. This measures your blood oxygen level.  · Electrocardiography leads (electrodes) on your chest. These record your heart rate and rhythm.  · Medicines given through an IV. These relax you and prevent pain. You may be awake or sleep lightly. If you have local anesthetic, it is injected directly into your skin.  · A facemask to give you oxygen, if needed.  Risks and possible complications  MAC has some risks. These include:  · Breathing problems  · Nausea and vomiting  · Allergic reaction to the anesthetic    Anesthesia safety  Tips for anesthesia safety include the following:   · Follow all instructions you are given for how long not to eat or drink before your procedure.  · Be sure your healthcare provider knows what medicines you take, especially any anti-inflammatory medicine or blood thinners. This includes aspirin and any other over-the-counter medicines, herbs, and supplements.  · Have an adult family member or friend drive you home after the procedure.  · For the first 24 hours after your  surgery:  ¨ Do not drive or use heavy equipment.  ¨ Do not make important decisions or sign documents.  ¨ Avoid alcohol.  ¨ Have someone stay with you, if possible. They can watch for problems and help keep you safe.  Date Last Reviewed: 12/1/2016  © 9665-3822 [employer]. [address] [place] [address], [place] [address]. All rights reserved. This information is not intended as a substitute for professional medical care. Always follow your healthcare professional's instructions.

## 2018-05-04 NOTE — BRIEF OP NOTE
[facility]  Brief Operative Note     SUMMARY     Surgery Date: 5/4/2018     Surgeon(s) and Role:     * Rohit Erazo MD - Primary    Assisting Surgeon: None    Pre-op Diagnosis:  Blister of buttock, initial encounter [S30.820A]    Post-op Diagnosis:  Post-Op Diagnosis Codes:     * Blister of buttock, initial encounter [S30.820A]    Procedure(s) (LRB):  EXCISION-MASS BUTTOCK (Left)    Anesthesia: Choice    Description of the findings of the procedure: Chronic infection    Findings/Key Components:     Estimated Blood Loss: * No values recorded between 5/4/2018 11:11 AM and 5/4/2018 11:34 AM *min       Specimens:   Specimen (12h ago through future)    Start     Ordered    05/04/18 1123  Specimen to Pathology - Surgery  Once     Comments:  1.  Left buttock mass      05/04/18 1122          Discharge Note    SUMMARY     Admit Date: 5/4/2018    Discharge Date and Time:  05/04/2018 12:17 PM    Hospital Course (synopsis of major diagnoses, care, treatment, and services provided during the course of the hospital stay): treated     Final Diagnosis: Post-Op Diagnosis Codes:     * Blister of buttock, initial encounter [S30.820A]    Disposition: Home or Self Care    Follow Up/Patient Instructions:     Medications:  Reconciled Home Medications:      Medication List      CHANGE how you take these medications    * hydrocodone-acetaminophen 10-325mg  mg Tab  Commonly known as:  NORCO  Take by mouth.  What changed:  Another medication with the same name was added. Make sure you understand how and when to take each.     * hydrocodone-acetaminophen 5-325mg 5-325 mg per tablet  Commonly known as:  NORCO  Take 1 or 2 tabs every 4 hours as needed.  What changed:  You were already taking a medication with the same name, and this prescription was added. Make sure you understand how and when to take each.        * This list has 2 medication(s) that are the same as other medications prescribed for you. Read  the directions carefully, and ask your doctor or other care provider to review them with you.            CONTINUE taking these medications    aspirin 81 MG EC tablet  Commonly known as:  ECOTRIN  Take 81 mg by mouth once daily.     atorvastatin 40 MG tablet  Commonly known as:  LIPITOR  Take 40 mg by mouth once daily.     baclofen 10 MG tablet  Commonly known as:  LIORESAL  Take 10 mg by mouth 3 (three) times daily.     clopidogrel 300 mg Tab  Commonly known as:  PLAVIX  Take 75 mg by mouth once.     folic acid 1 MG tablet  Commonly known as:  FOLVITE  Take 1 mg by mouth once daily.     hydroxychloroquine 200 mg tablet  Commonly known as:  PLAQUENIL  Take 200 mg by mouth 2 (two) times daily.     losartan 50 MG tablet  Commonly known as:  COZAAR  Take 50 mg by mouth once daily.     methotrexate 2.5 MG Tab  Take by mouth every 7 days.     metoprolol succinate 50 MG 24 hr tablet  Commonly known as:  TOPROL-XL  Take 50 mg by mouth 2 (two) times daily.     pentoxifylline 400 mg Tbsr  Commonly known as:  TRENTAL  Take 400 mg by mouth 2 (two) times daily.     potassium chloride 10 MEQ Cpsr  Commonly known as:  MICRO-K  Take 10 mEq by mouth once.            Discharge Procedure Orders  Diet general     Activity as tolerated     Shower on day dressing removed (No bath)   Order Comments: You may shower on the 2nd day following your surgery.     Call MD for:  redness, tenderness, or signs of infection (pain, swelling, redness, odor or green/yellow discharge around incision site)     Remove dressing in 48 hours       Follow-up Information     Rohit Erazo MD In 2 weeks.    Specialty:  General Surgery  Contact information:  [address]  [facility] [address] [phone]

## 2018-05-04 NOTE — INTERVAL H&P NOTE
The patient has been examined and the H&P has been reviewed:    I concur with the findings and no changes have occurred since H&P was written.    Anesthesia/Surgery risks, benefits and alternative options discussed and understood by patient/family.          Active Hospital Problems    Diagnosis  POA    Blister of buttock [S30.820A]  Yes      Resolved Hospital Problems    Diagnosis Date Resolved POA   No resolved problems to display.

## 2018-05-04 NOTE — PLAN OF CARE
Facundo Lord Jr. has met all discharge criteria from Phase II. Vital Signs are stable, ambulating  without difficulty. Discharge instructions given, patient verbalized understanding. Discharged from facility via wheelchair in stable condition.

## 2018-05-24 ENCOUNTER — TELEPHONE (OUTPATIENT)
Dept: PHARMACY | Facility: CLINIC | Age: 63
End: 2018-05-24

## 2018-05-24 ENCOUNTER — CLINICAL SUPPORT (OUTPATIENT)
Dept: SMOKING CESSATION | Facility: CLINIC | Age: 63
End: 2018-05-24
Payer: COMMERCIAL

## 2018-05-24 DIAGNOSIS — F17.210 MODERATE CIGARETTE SMOKER (10-19 PER DAY): Primary | ICD-10-CM

## 2018-05-24 PROCEDURE — 99999 PR PBB SHADOW E&M-EST. PATIENT-LVL I: CPT | Mod: PBBFAC,,,

## 2018-05-24 PROCEDURE — 99404 PREV MED CNSL INDIV APPRX 60: CPT | Mod: S$GLB,,,

## 2018-05-24 RX ORDER — IBUPROFEN 200 MG
1 TABLET ORAL DAILY
Qty: 14 PATCH | Refills: 0 | Status: SHIPPED | OUTPATIENT
Start: 2018-05-24 | End: 2018-06-07 | Stop reason: SDUPTHER

## 2018-05-24 NOTE — Clinical Note
Patient will be participating in weekly tobacco cessation meetings and will begin the prescribed tobacco cessation medication regimen of 21 mg patches until Chantix is approved and received.  FTND of 0 indicates no dependence to tobacco but patient has never been able to quit and remain quit for any length of time increasing the dependence.  KESHAWN-D of 6 is perceived as not mental distress or depression at this time.

## 2018-05-24 NOTE — TELEPHONE ENCOUNTER
Called and reminded patient when he comes for his appointment to bring his proof of income and EOB so we can submit his application to [employer].

## 2018-05-30 ENCOUNTER — CLINICAL SUPPORT (OUTPATIENT)
Dept: SMOKING CESSATION | Facility: CLINIC | Age: 63
End: 2018-05-30
Payer: COMMERCIAL

## 2018-05-30 ENCOUNTER — TELEPHONE (OUTPATIENT)
Dept: SMOKING CESSATION | Facility: CLINIC | Age: 63
End: 2018-05-30

## 2018-05-30 DIAGNOSIS — F17.210 LIGHT CIGARETTE SMOKER (1-9 CIGS/DAY): Primary | ICD-10-CM

## 2018-05-30 PROCEDURE — 99999 PR PBB SHADOW E&M-EST. PATIENT-LVL I: CPT | Mod: PBBFAC,,,

## 2018-05-30 PROCEDURE — 99402 PREV MED CNSL INDIV APPRX 30: CPT | Mod: S$GLB,,,

## 2018-05-30 NOTE — PROGRESS NOTES
Individual Follow-Up Form    5/30/2018    Quit Date:     Clinical Status of Patient: Outpatient    Length of Service: 30 minutes    Continuing Medication: yes  Patches    Other Medications:      Target Symptoms: Withdrawal and medication side effects. The following were  rated moderate (3) to severe (4) on TCRS:  · Moderate (3): none  · Severe (4): none    Comments:  orientation, client introductions, completion of TCRS (Tobacco Cessation Rating Scale) learned addiction model, cues/triggers, personal reasons for quitting, medications, goals, quit date. The patient is only smoking 4 cigarettes per day and not experiencing any negative side effects from the reduction. The reduction was attributed to the 21 mg patches and the patient will begin the habit modification process on 5/31/18. Patient remains on prescribed tobacco cessation medication regimen of 21 mg patch without any negative side effects at this time. The patient denies any abnormal behavioral or mental changes at this time. The patient will continue with group therapy sessions and medication monitoring by CTTS. Prescribed medication management will be by physician.     Diagnosis: F17.210    Next Visit: 1 week

## 2018-05-30 NOTE — Clinical Note
Just a note to advise how the patient is progressing in the tobacco cessation program.  The patient is only smoking 4 cigarettes per day and not experiencing any negative side effects from the reduction. The reduction was attributed to the 21 mg patches and the patient will begin the habit modification process on 5/31/18. Patient remains on prescribed tobacco cessation medication regimen of 21 mg patch without any negative side effects at this time. The patient denies any abnormal behavioral or mental changes at this time.

## 2018-06-07 ENCOUNTER — CLINICAL SUPPORT (OUTPATIENT)
Dept: SMOKING CESSATION | Facility: CLINIC | Age: 63
End: 2018-06-07
Payer: COMMERCIAL

## 2018-06-07 DIAGNOSIS — F17.210 LIGHT CIGARETTE SMOKER (1-9 CIGS/DAY): Primary | ICD-10-CM

## 2018-06-07 DIAGNOSIS — F17.210 MODERATE CIGARETTE SMOKER (10-19 PER DAY): ICD-10-CM

## 2018-06-07 PROCEDURE — 99407 BEHAV CHNG SMOKING > 10 MIN: CPT | Mod: S$GLB,,,

## 2018-06-07 PROCEDURE — 99999 PR PBB SHADOW E&M-EST. PATIENT-LVL I: CPT | Mod: PBBFAC,,,

## 2018-06-07 RX ORDER — IBUPROFEN 200 MG
1 TABLET ORAL DAILY
Qty: 14 PATCH | Refills: 0 | Status: SHIPPED | OUTPATIENT
Start: 2018-06-07 | End: 2018-06-20 | Stop reason: SDUPTHER

## 2018-06-14 ENCOUNTER — CLINICAL SUPPORT (OUTPATIENT)
Dept: SMOKING CESSATION | Facility: CLINIC | Age: 63
End: 2018-06-14
Payer: COMMERCIAL

## 2018-06-14 DIAGNOSIS — F17.210 LIGHT CIGARETTE SMOKER (1-9 CIGS/DAY): Primary | ICD-10-CM

## 2018-06-14 PROCEDURE — 99402 PREV MED CNSL INDIV APPRX 30: CPT | Mod: S$GLB,,,

## 2018-06-14 PROCEDURE — 99999 PR PBB SHADOW E&M-EST. PATIENT-LVL I: CPT | Mod: PBBFAC,,,

## 2018-06-14 NOTE — Clinical Note
The patient is smoking 4 cigarettes per day and discussed a quit but not set at this time. Patient did state he would try but not date associated with the quit attempt. The patient denies any abnormal behavioral or mental changes at this time.

## 2018-06-14 NOTE — PROGRESS NOTES
Individual Follow-Up Form    6/14/2018    Quit Date:     Clinical Status of Patient: Outpatient    Length of Service: 30 minutes    Continuing Medication: yes  Patches    Other Medications:      Target Symptoms: Withdrawal and medication side effects. The following were  rated moderate (3) to severe (4) on TCRS:  · Moderate (3): none  · Severe (4): none    Comments: completion of TCRS (Tobacco Cessation Rating Scale) reviewed strategies, cues, and triggers. Introduced the negative impact of tobacco on health, the health advantages of discontinuing the use of tobacco, time line improved health changes after a quit, withdrawal issues to expect from nicotine and habit, and ways to achieve the goal of a quit. The patient is smoking 4 cigarettes per day and discussed a quit but not set at this time. Patient did state he would try but not date associated with the quit attempt. The patient denies any abnormal behavioral or mental changes at this time. The patient will continue with group therapy sessions and medication monitoring by CTTS. Prescribed medication management will be by physician.     Diagnosis: F17.210    Next Visit: 1 week

## 2018-06-20 ENCOUNTER — CLINICAL SUPPORT (OUTPATIENT)
Dept: SMOKING CESSATION | Facility: CLINIC | Age: 63
End: 2018-06-20
Payer: COMMERCIAL

## 2018-06-20 DIAGNOSIS — F17.210 MODERATE CIGARETTE SMOKER (10-19 PER DAY): ICD-10-CM

## 2018-06-20 PROCEDURE — 99999 PR PBB SHADOW E&M-EST. PATIENT-LVL I: CPT | Mod: PBBFAC,,,

## 2018-06-20 PROCEDURE — 99402 PREV MED CNSL INDIV APPRX 30: CPT | Mod: S$GLB,,,

## 2018-06-20 RX ORDER — BUPROPION HYDROCHLORIDE 150 MG/1
150 TABLET, EXTENDED RELEASE ORAL 2 TIMES DAILY
Qty: 60 TABLET | Refills: 0 | Status: SHIPPED | OUTPATIENT
Start: 2018-06-20 | End: 2018-08-20 | Stop reason: SDUPTHER

## 2018-06-20 RX ORDER — IBUPROFEN 200 MG
1 TABLET ORAL DAILY
Qty: 14 PATCH | Refills: 0 | Status: SHIPPED | OUTPATIENT
Start: 2018-06-20 | End: 2018-06-28 | Stop reason: DRUGHIGH

## 2018-06-20 NOTE — PROGRESS NOTES
Individual Follow-Up Form    6/20/2018    Quit Date:     Clinical Status of Patient: Outpatient    Length of Service: 30 minutes    Continuing Medication: yes  Patches    Other Medications:      Target Symptoms: Withdrawal and medication side effects. The following were  rated moderate (3) to severe (4) on TCRS:  · Moderate (3): restless, anxious; reviewed with the patient and habit component and not nicotine is at the root. Interventions discussed to help the patient.    · Severe (4): none    Comments: completion of TCRS (Tobacco Cessation Rating Scale) reviewed strategies, controlling environment, cues, triggers, new goals set. Introduced high risk situations with preparation interventions, caffeine similarities with withdrawal issues of habit and nicotine, Alcohol, Understanding urges, cravings, stress and relaxation. The patient attempted a quit last week and was not able to go 24 hours without smoking. The patient is experiencing the following negative side effect: restless, anxious; reviewed with the patient and habit component and not nicotine is at the root. Interventions discussed to help the patient. Wellbutrin has been added due to the patient not making progress with just the patches. Patient remains on prescribed tobacco cessation medication regimen of 21 mg patch without any negative side effects at this time. The patient denies any abnormal behavioral or mental changes at this time. The patient will continue with group therapy sessions and medication monitoring by CTTS. Prescribed medication management will be by physician.       Diagnosis: F17.210    Next Visit: 1 week

## 2018-06-20 NOTE — Clinical Note
The patient attempted a quit last week and was not able to go 24 hours without smoking. The patient is experiencing the following negative side effect: restless, anxious; reviewed with the patient and habit component and not nicotine is at the root. Interventions discussed to help the patient. Wellbutrin has been added due to the patient not making progress with just the patches. Patient remains on prescribed tobacco cessation medication regimen of 21 mg patch without any negative side effects at this time. The patient denies any abnormal behavioral or mental changes at this time.

## 2018-06-28 ENCOUNTER — CLINICAL SUPPORT (OUTPATIENT)
Dept: SMOKING CESSATION | Facility: CLINIC | Age: 63
End: 2018-06-28
Payer: COMMERCIAL

## 2018-06-28 DIAGNOSIS — F17.210 LIGHT CIGARETTE SMOKER (1-9 CIGS/DAY): Primary | ICD-10-CM

## 2018-06-28 PROCEDURE — 99999 PR PBB SHADOW E&M-EST. PATIENT-LVL I: CPT | Mod: PBBFAC,,,

## 2018-06-28 PROCEDURE — 99407 BEHAV CHNG SMOKING > 10 MIN: CPT | Mod: S$GLB,,,

## 2018-06-28 RX ORDER — IBUPROFEN 200 MG
TABLET ORAL
Qty: 28 PATCH | Refills: 0 | Status: SHIPPED | OUTPATIENT
Start: 2018-06-28 | End: 2018-08-20 | Stop reason: DRUGHIGH

## 2018-07-25 ENCOUNTER — CLINICAL SUPPORT (OUTPATIENT)
Dept: SMOKING CESSATION | Facility: CLINIC | Age: 63
End: 2018-07-25
Payer: COMMERCIAL

## 2018-07-25 DIAGNOSIS — F17.210 LIGHT CIGARETTE SMOKER (1-9 CIGS/DAY): Primary | ICD-10-CM

## 2018-07-25 PROCEDURE — 99999 PR PBB SHADOW E&M-EST. PATIENT-LVL I: CPT | Mod: PBBFAC,,,

## 2018-07-25 PROCEDURE — 99407 BEHAV CHNG SMOKING > 10 MIN: CPT | Mod: S$GLB,,,

## 2018-08-14 ENCOUNTER — TELEPHONE (OUTPATIENT)
Dept: SMOKING CESSATION | Facility: CLINIC | Age: 63
End: 2018-08-14

## 2018-08-20 ENCOUNTER — CLINICAL SUPPORT (OUTPATIENT)
Dept: SMOKING CESSATION | Facility: CLINIC | Age: 63
End: 2018-08-20
Payer: COMMERCIAL

## 2018-08-20 DIAGNOSIS — F17.210 LIGHT CIGARETTE SMOKER (1-9 CIGS/DAY): Primary | ICD-10-CM

## 2018-08-20 DIAGNOSIS — F17.210 MODERATE CIGARETTE SMOKER (10-19 PER DAY): ICD-10-CM

## 2018-08-20 PROCEDURE — 99402 PREV MED CNSL INDIV APPRX 30: CPT | Mod: S$GLB,,,

## 2018-08-20 PROCEDURE — 99999 PR PBB SHADOW E&M-EST. PATIENT-LVL I: CPT | Mod: PBBFAC,,,

## 2018-08-20 RX ORDER — BUPROPION HYDROCHLORIDE 150 MG/1
150 TABLET, EXTENDED RELEASE ORAL 2 TIMES DAILY
Qty: 60 TABLET | Refills: 0 | Status: SHIPPED | OUTPATIENT
Start: 2018-08-20 | End: 2019-12-02

## 2018-08-20 RX ORDER — IBUPROFEN 200 MG
1 TABLET ORAL DAILY
Qty: 14 PATCH | Refills: 0 | Status: SHIPPED | OUTPATIENT
Start: 2018-08-20 | End: 2018-09-17 | Stop reason: SDUPTHER

## 2018-08-20 NOTE — PROGRESS NOTES
Individual Follow-Up Form    8/20/2018    Quit Date:     Clinical Status of Patient: Outpatient    Length of Service: 30 minutes    Continuing Medication: yes  Wellbutrin    Other Medications: 21 mg nicotine patches     Target Symptoms: Withdrawal and medication side effects. The following were  rated moderate (3) to severe (4) on TCRS:  · Moderate (3): none  · Severe (4): desire, crave tobacco, boredom; reviewed situations with the patient and discussed habit component with the patient with interventions.     Comments: completion of TCRS (Tobacco Cessation Rating Scale) reviewed strategies, habitual behavior, stress, and high risk situations. Introduced stress with addition interventions, SOLVE, relaxation with interventions, nutrition, exercise, weight gain, and the importance of rewarding oneself for accomplishments toward becoming tobacco free. The patient is smoking 6 cigarettes per day and experiencing the following negative side effects: desire, crave tobacco, boredom; reviewed situations with the patient and discussed habit component with the patient with interventions. Discussed with the patient about not receiving therapeutic side effects from the patches and increasing the Wellbutrin to BID. The patient is afraid he will increase his smoking without the patches and discussed remaining on the for a couple weeks and then trying to stop them and use just the Wellbutrin. The patient denies any abnormal behavioral or mental changes at this time. The patient will continue with group therapy sessions and medication monitoring by CTTS. Prescribed medication management will be by physician.     Diagnosis: F17.210    Next Visit: 1 week

## 2018-08-20 NOTE — Clinical Note
Just a note to advise how the patient is progressing in the tobacco cessation program. The patient is smoking 6 cigarettes per day and experiencing the following negative side effects: desire, crave tobacco, boredom; reviewed situations with the patient and discussed habit component with the patient with interventions. Discussed with the patient about not receiving therapeutic side effects from the patches and increasing the Wellbutrin to BID. The patient is afraid he will increase his smoking without the patches and discussed remaining on the for a couple weeks and then trying to stop them and use just the Wellbutrin.

## 2018-08-24 ENCOUNTER — CLINICAL SUPPORT (OUTPATIENT)
Dept: SMOKING CESSATION | Facility: CLINIC | Age: 63
End: 2018-08-24
Payer: COMMERCIAL

## 2018-08-24 DIAGNOSIS — F17.200 NICOTINE DEPENDENCE: Primary | ICD-10-CM

## 2018-08-24 PROCEDURE — 99406 BEHAV CHNG SMOKING 3-10 MIN: CPT | Mod: S$GLB,,,

## 2018-08-24 NOTE — PROGRESS NOTES
Successful contact with patient regarding tobacco cessation quit #1. Pt states, he is still working towards becoming tobacco free and he has cut down tremendously. Pt commended for the accomplishment thus far. Pt informed of his current/next available benefit status and future 6 and 12 month telephone follow ups. Will update the tobacco cessation smart form for 3 months on quit #1.

## 2018-09-04 ENCOUNTER — TELEPHONE (OUTPATIENT)
Dept: SMOKING CESSATION | Facility: CLINIC | Age: 63
End: 2018-09-04

## 2018-09-04 NOTE — TELEPHONE ENCOUNTER
Patient called to inform that he is still smoking on some days only 1 cigarette and that he has been at [facility] for a week with his son in intensive care. Patient stated he has been under a lot of stress but still trying to become tobacco free. Reviewed some interventions and patient stated he has enough patches to get him through for the next week. Patient stated he will call when he can return for an individual session.

## 2018-09-17 ENCOUNTER — CLINICAL SUPPORT (OUTPATIENT)
Dept: SMOKING CESSATION | Facility: CLINIC | Age: 63
End: 2018-09-17
Payer: COMMERCIAL

## 2018-09-17 DIAGNOSIS — F17.210 LIGHT CIGARETTE SMOKER (1-9 CIGS/DAY): Primary | ICD-10-CM

## 2018-09-17 DIAGNOSIS — F17.210 MODERATE CIGARETTE SMOKER (10-19 PER DAY): ICD-10-CM

## 2018-09-17 PROCEDURE — 99999 PR PBB SHADOW E&M-EST. PATIENT-LVL I: CPT | Mod: PBBFAC,,,

## 2018-09-17 PROCEDURE — 99402 PREV MED CNSL INDIV APPRX 30: CPT | Mod: S$GLB,,,

## 2018-09-17 RX ORDER — IBUPROFEN 200 MG
1 TABLET ORAL DAILY
Qty: 28 PATCH | Refills: 0 | Status: SHIPPED | OUTPATIENT
Start: 2018-09-17 | End: 2019-04-23

## 2018-09-17 NOTE — PROGRESS NOTES
Individual Follow-Up Form    9/17/2018    Quit Date:     Clinical Status of Patient: Outpatient    Length of Service: 30 minutes    Continuing Medication: yes  Patches    Other Medications:      Target Symptoms: Withdrawal and medication side effects. The following were  rated moderate (3) to severe (4) on TCRS:  · Moderate (3): none  · Severe (4): none    Comments: Phone encounter reviewed strategies, habitual behavior, high risks situations, understanding urges and cravings, stress and relaxation with open discussion and additional interventions, Introduced lapses, relapses, understanding them and analyzing the situation of a lapse, conflict issues that may be linked to a lapse. Patient has been very stressed over his son being in the hospital for a month. The patient was not able to remain tobacco free for more than 2 days and the stress of his son being ill has contributed to the lapse back to 9 cigarettes per day most days but some days 12. The patient had decided not to try any longer to quit and has had a change due to his son wanting his father to keep trying. Patient will restart using the 21 mg patches on 9/19/18 and will call to set up another appointment within the next two weeks. The patient denies any abnormal behavioral or mental changes at this time. The patient will continue with group therapy sessions and medication monitoring by CTTS. Prescribed medication management will be by physician.     Diagnosis: F17.210    Next Visit: 2 weeks

## 2018-09-17 NOTE — Clinical Note
Just a note to advise how the patient is progressing in the tobacco cessation program. Patient has been very stressed over his son being in the hospital for a month. The patient was not able to remain tobacco free for more than 2 days and the stress of his son being ill has contributed to the lapse back to 9 cigarettes per day most days but some days 12. The patient had decided not to try any longer to quit and has had a change due to his son wanting his father to keep trying. Patient will restart using the 21 mg patches on 9/19/18 and will call to set up another appointment within the next two weeks.

## 2018-11-09 ENCOUNTER — TELEPHONE (OUTPATIENT)
Dept: SMOKING CESSATION | Facility: CLINIC | Age: 63
End: 2018-11-09

## 2018-11-15 ENCOUNTER — ANESTHESIA EVENT (OUTPATIENT)
Dept: SURGERY | Facility: OTHER | Age: 63
DRG: 470 | End: 2018-11-15
Payer: MEDICARE

## 2018-11-15 ENCOUNTER — HOSPITAL ENCOUNTER (OUTPATIENT)
Dept: PREADMISSION TESTING | Facility: OTHER | Age: 63
Discharge: HOME | End: 2018-11-15
Attending: ORTHOPAEDIC SURGERY
Payer: MEDICARE

## 2018-11-15 VITALS
OXYGEN SATURATION: 96 % | SYSTOLIC BLOOD PRESSURE: 134 MMHG | TEMPERATURE: 98 F | DIASTOLIC BLOOD PRESSURE: 88 MMHG | HEART RATE: 87 BPM | WEIGHT: 315 LBS | HEIGHT: 72 IN | BODY MASS INDEX: 42.66 KG/M2

## 2018-11-15 DIAGNOSIS — M16.11 PRIMARY OSTEOARTHRITIS OF RIGHT HIP: Primary | ICD-10-CM

## 2018-11-15 LAB
ABO + RH BLD: NORMAL
ALBUMIN SERPL BCP-MCNC: 3.7 G/DL
ALP SERPL-CCNC: 102 U/L
ALT SERPL W/O P-5'-P-CCNC: 16 U/L
ANION GAP SERPL CALC-SCNC: 11 MMOL/L
AST SERPL-CCNC: 15 U/L
BACTERIA #/AREA URNS HPF: ABNORMAL /HPF
BASOPHILS # BLD AUTO: 0.04 K/UL
BASOPHILS NFR BLD: 0.3 %
BILIRUB SERPL-MCNC: 0.6 MG/DL
BILIRUB UR QL STRIP: NEGATIVE
BLD GP AB SCN CELLS X3 SERPL QL: NORMAL
BUN SERPL-MCNC: 18 MG/DL
CALCIUM SERPL-MCNC: 10 MG/DL
CHLORIDE SERPL-SCNC: 103 MMOL/L
CLARITY UR: CLEAR
CO2 SERPL-SCNC: 24 MMOL/L
COLOR UR: YELLOW
CREAT SERPL-MCNC: 1.2 MG/DL
DIFFERENTIAL METHOD: ABNORMAL
EOSINOPHIL # BLD AUTO: 0.3 K/UL
EOSINOPHIL NFR BLD: 2.4 %
ERYTHROCYTE [DISTWIDTH] IN BLOOD BY AUTOMATED COUNT: 13.9 %
EST. GFR  (AFRICAN AMERICAN): >60 ML/MIN/1.73 M^2
EST. GFR  (NON AFRICAN AMERICAN): >60 ML/MIN/1.73 M^2
GLUCOSE SERPL-MCNC: 99 MG/DL
GLUCOSE UR QL STRIP: NEGATIVE
HCT VFR BLD AUTO: 49.8 %
HGB BLD-MCNC: 16.1 G/DL
HGB UR QL STRIP: NEGATIVE
KETONES UR QL STRIP: NEGATIVE
LEUKOCYTE ESTERASE UR QL STRIP: NEGATIVE
LYMPHOCYTES # BLD AUTO: 1.6 K/UL
LYMPHOCYTES NFR BLD: 13 %
MCH RBC QN AUTO: 28.3 PG
MCHC RBC AUTO-ENTMCNC: 32.3 G/DL
MCV RBC AUTO: 88 FL
MICROSCOPIC COMMENT: ABNORMAL
MONOCYTES # BLD AUTO: 1.1 K/UL
MONOCYTES NFR BLD: 8.5 %
NEUTROPHILS # BLD AUTO: 9.3 K/UL
NEUTROPHILS NFR BLD: 74.8 %
NITRITE UR QL STRIP: POSITIVE
PH UR STRIP: 6 [PH] (ref 5–8)
PLATELET # BLD AUTO: 322 K/UL
PMV BLD AUTO: 8.5 FL
POTASSIUM SERPL-SCNC: 4.5 MMOL/L
PROT SERPL-MCNC: 8.3 G/DL
PROT UR QL STRIP: NEGATIVE
RBC # BLD AUTO: 5.69 M/UL
SODIUM SERPL-SCNC: 138 MMOL/L
SP GR UR STRIP: >=1.03 (ref 1–1.03)
SQUAMOUS #/AREA URNS HPF: 3 /HPF
URN SPEC COLLECT METH UR: ABNORMAL
UROBILINOGEN UR STRIP-ACNC: NEGATIVE EU/DL
WBC # BLD AUTO: 12.48 K/UL
WBC #/AREA URNS HPF: 2 /HPF (ref 0–5)

## 2018-11-15 PROCEDURE — 86901 BLOOD TYPING SEROLOGIC RH(D): CPT

## 2018-11-15 PROCEDURE — 85025 COMPLETE CBC W/AUTO DIFF WBC: CPT

## 2018-11-15 PROCEDURE — 93010 ELECTROCARDIOGRAM REPORT: CPT | Mod: ,,, | Performed by: INTERNAL MEDICINE

## 2018-11-15 PROCEDURE — 87086 URINE CULTURE/COLONY COUNT: CPT

## 2018-11-15 PROCEDURE — 80053 COMPREHEN METABOLIC PANEL: CPT

## 2018-11-15 PROCEDURE — 93005 ELECTROCARDIOGRAM TRACING: CPT

## 2018-11-15 PROCEDURE — 81000 URINALYSIS NONAUTO W/SCOPE: CPT

## 2018-11-15 RX ORDER — LIDOCAINE HYDROCHLORIDE 10 MG/ML
0.5 INJECTION, SOLUTION EPIDURAL; INFILTRATION; INTRACAUDAL; PERINEURAL ONCE
Status: CANCELLED | OUTPATIENT
Start: 2018-11-15 | End: 2018-11-15

## 2018-11-15 RX ORDER — SODIUM CHLORIDE, SODIUM LACTATE, POTASSIUM CHLORIDE, CALCIUM CHLORIDE 600; 310; 30; 20 MG/100ML; MG/100ML; MG/100ML; MG/100ML
INJECTION, SOLUTION INTRAVENOUS CONTINUOUS
Status: CANCELLED | OUTPATIENT
Start: 2018-11-15

## 2018-11-15 NOTE — PRE ADMISSION SCREENING
Lab and EKG faxed to outside cardiologist, Dr. Cole Chacon.  Requested clearance, direction for blood thinners and most recent echo.

## 2018-11-15 NOTE — DISCHARGE INSTRUCTIONS
PRE-ADMIT TESTING -  [phone]    [address]  [place] [facility]          Your surgery has been scheduled at [facility]. We are pleased to have the opportunity to serve you. For Further Information please call [phone].    On the day of surgery please report to the Information Desk on the 1st floor.    · CONTACT YOUR PHYSICIAN'S OFFICE THE DAY PRIOR TO YOUR SURGERY TO OBTAIN YOUR ARRIVAL TIME.     · The evening before surgery do not eat anything after 9 p.m. ( this includes hard candy, chewing gum and mints).  You may only have GATORADE, POWERADE AND WATER  from 9 p.m. until you leave your home.   DO NOT DRINK ANY LIQUIDS ON THE WAY TO THE HOSPITAL.      SPECIAL MEDICATION INSTRUCTIONS: TAKE medications checked off by the Anesthesiologist on your Medication List.    Angiogram Patients: Take medications as instructed by your physician, including aspirin.     Surgery Patients:    If you take ASPIRIN - Your PHYSICIAN/SURGEON will need to inform you IF/OR when you need to stop taking aspirin prior to your surgery.     Do Not take any medications containing IBUPROFEN.  Do Not Wear any make-up or dark nail polish   (especially eye make-up) to surgery. If you come to surgery with makeup on you will be required to remove the makeup or nail polish.    Do not shave your surgical area at least 5 days prior to your surgery. The surgical prep will be performed at the hospital according to Infection Control regulations.    Leave all valuables at home.   Do Not wear any jewelry or watches, including any metal in body piercings.  Contact Lens must be removed before surgery. Either do not wear the contact lens or bring a case and solution for storage.  Please bring a container for eyeglasses or dentures as required.  Bring any paperwork your physician has provided, such as consent forms,  history and physicals, doctor's orders, etc.   Bring comfortable clothes that are loose fitting to wear upon  discharge. Take into consideration the type of surgery being performed.  Maintain your diet as advised per your physician the day prior to surgery.      Adequate rest the night before surgery is advised.   Park in the Parking lot behind the hospital or in the [facility] across the street from the parking lot. Parking is complimentary.  If you will be discharged the same day as your procedure, please arrange for a responsible adult to drive you home or to accompany you if traveling by taxi.   YOU WILL NOT BE PERMITTED TO DRIVE OR TO LEAVE THE HOSPITAL ALONE AFTER SURGERY.   It is strongly recommended that you arrange for someone to remain with you for the first 24 hrs following your surgery.       Thank you for your cooperation.  The Staff of [facility].        Bathing Instructions                                                                 Please shower the evening before and morning of your procedure with    ANTIBACTERIAL SOAP. ( DIAL, etc )  Concentrate on the surgical area   for at least 3 minutes and rinse completely. Dry off as usual.   Do not use any deodorant, powder, body lotions, perfume, after shave or    cologne.

## 2018-11-16 LAB — BACTERIA UR CULT: NORMAL

## 2018-11-26 ENCOUNTER — TELEPHONE (OUTPATIENT)
Dept: SMOKING CESSATION | Facility: CLINIC | Age: 63
End: 2018-11-26

## 2018-11-26 ENCOUNTER — ANESTHESIA (OUTPATIENT)
Dept: SURGERY | Facility: OTHER | Age: 63
DRG: 470 | End: 2018-11-26
Payer: MEDICARE

## 2018-11-26 ENCOUNTER — HOSPITAL ENCOUNTER (INPATIENT)
Facility: OTHER | Age: 63
LOS: 1 days | Discharge: HOME HEALTH | DRG: 470 | End: 2018-11-27
Attending: ORTHOPAEDIC SURGERY | Admitting: ORTHOPAEDIC SURGERY
Payer: MEDICARE

## 2018-11-26 DIAGNOSIS — M16.11 PRIMARY OSTEOARTHRITIS OF RIGHT HIP: Primary | ICD-10-CM

## 2018-11-26 PROCEDURE — 71000033 HC RECOVERY, INTIAL HOUR: Performed by: ORTHOPAEDIC SURGERY

## 2018-11-26 PROCEDURE — 25000003 PHARM REV CODE 250

## 2018-11-26 PROCEDURE — C1729 CATH, DRAINAGE: HCPCS | Performed by: ORTHOPAEDIC SURGERY

## 2018-11-26 PROCEDURE — 0LNJ3ZZ RELEASE RIGHT HIP TENDON, PERCUTANEOUS APPROACH: ICD-10-PCS | Performed by: ORTHOPAEDIC SURGERY

## 2018-11-26 PROCEDURE — 25000003 PHARM REV CODE 250: Performed by: SPECIALIST

## 2018-11-26 PROCEDURE — 25000003 PHARM REV CODE 250: Performed by: ORTHOPAEDIC SURGERY

## 2018-11-26 PROCEDURE — 27201423 OPTIME MED/SURG SUP & DEVICES STERILE SUPPLY: Performed by: ORTHOPAEDIC SURGERY

## 2018-11-26 PROCEDURE — 37000009 HC ANESTHESIA EA ADD 15 MINS: Performed by: ORTHOPAEDIC SURGERY

## 2018-11-26 PROCEDURE — C9290 INJ, BUPIVACAINE LIPOSOME: HCPCS | Performed by: ORTHOPAEDIC SURGERY

## 2018-11-26 PROCEDURE — 94761 N-INVAS EAR/PLS OXIMETRY MLT: CPT

## 2018-11-26 PROCEDURE — 37000008 HC ANESTHESIA 1ST 15 MINUTES: Performed by: ORTHOPAEDIC SURGERY

## 2018-11-26 PROCEDURE — 36000711: Performed by: ORTHOPAEDIC SURGERY

## 2018-11-26 PROCEDURE — 63600175 PHARM REV CODE 636 W HCPCS: Performed by: ORTHOPAEDIC SURGERY

## 2018-11-26 PROCEDURE — 63600175 PHARM REV CODE 636 W HCPCS: Performed by: SPECIALIST

## 2018-11-26 PROCEDURE — C1776 JOINT DEVICE (IMPLANTABLE): HCPCS | Performed by: ORTHOPAEDIC SURGERY

## 2018-11-26 PROCEDURE — 63600175 PHARM REV CODE 636 W HCPCS: Performed by: NURSE ANESTHETIST, CERTIFIED REGISTERED

## 2018-11-26 PROCEDURE — 63600175 PHARM REV CODE 636 W HCPCS

## 2018-11-26 PROCEDURE — 25000003 PHARM REV CODE 250: Performed by: NURSE PRACTITIONER

## 2018-11-26 PROCEDURE — 97530 THERAPEUTIC ACTIVITIES: CPT

## 2018-11-26 PROCEDURE — 97116 GAIT TRAINING THERAPY: CPT

## 2018-11-26 PROCEDURE — C1713 ANCHOR/SCREW BN/BN,TIS/BN: HCPCS | Performed by: ORTHOPAEDIC SURGERY

## 2018-11-26 PROCEDURE — 0SR904A REPLACEMENT OF RIGHT HIP JOINT WITH CERAMIC ON POLYETHYLENE SYNTHETIC SUBSTITUTE, UNCEMENTED, OPEN APPROACH: ICD-10-PCS | Performed by: ORTHOPAEDIC SURGERY

## 2018-11-26 PROCEDURE — 11000001 HC ACUTE MED/SURG PRIVATE ROOM

## 2018-11-26 PROCEDURE — 94799 UNLISTED PULMONARY SVC/PX: CPT

## 2018-11-26 PROCEDURE — 25000003 PHARM REV CODE 250: Performed by: ANESTHESIOLOGY

## 2018-11-26 PROCEDURE — 36000710: Performed by: ORTHOPAEDIC SURGERY

## 2018-11-26 PROCEDURE — 71000039 HC RECOVERY, EACH ADD'L HOUR: Performed by: ORTHOPAEDIC SURGERY

## 2018-11-26 PROCEDURE — 97162 PT EVAL MOD COMPLEX 30 MIN: CPT

## 2018-11-26 PROCEDURE — 25000003 PHARM REV CODE 250: Performed by: NURSE ANESTHETIST, CERTIFIED REGISTERED

## 2018-11-26 DEVICE — SHELL ACE CLUSTER HOLE 54MM: Type: IMPLANTABLE DEVICE | Site: HIP | Status: FUNCTIONAL

## 2018-11-26 DEVICE — SCREW BONE 6.5X30 SELF-TAP: Type: IMPLANTABLE DEVICE | Site: HIP | Status: FUNCTIONAL

## 2018-11-26 DEVICE — STEM FEM TM PRIMARY14MM STAND: Type: IMPLANTABLE DEVICE | Site: HIP | Status: FUNCTIONAL

## 2018-11-26 DEVICE — LINER POLY 36MM: Type: IMPLANTABLE DEVICE | Site: HIP | Status: FUNCTIONAL

## 2018-11-26 DEVICE — SCREW BONE 6.5X20 SELF-TAP: Type: IMPLANTABLE DEVICE | Site: HIP | Status: FUNCTIONAL

## 2018-11-26 DEVICE — HEAD FEM BIOLOX +3.5X36MM: Type: IMPLANTABLE DEVICE | Site: HIP | Status: FUNCTIONAL

## 2018-11-26 RX ORDER — MIDAZOLAM HYDROCHLORIDE 1 MG/ML
INJECTION INTRAMUSCULAR; INTRAVENOUS
Status: DISCONTINUED | OUTPATIENT
Start: 2018-11-26 | End: 2018-11-26

## 2018-11-26 RX ORDER — TRANEXAMIC ACID 100 MG/ML
INJECTION, SOLUTION INTRAVENOUS
Status: DISCONTINUED | OUTPATIENT
Start: 2018-11-26 | End: 2018-11-26

## 2018-11-26 RX ORDER — SODIUM CHLORIDE 0.9 % (FLUSH) 0.9 %
5 SYRINGE (ML) INJECTION
Status: DISCONTINUED | OUTPATIENT
Start: 2018-11-26 | End: 2018-11-27 | Stop reason: HOSPADM

## 2018-11-26 RX ORDER — LOSARTAN POTASSIUM 50 MG/1
50 TABLET ORAL DAILY
Status: DISCONTINUED | OUTPATIENT
Start: 2018-11-26 | End: 2018-11-27 | Stop reason: HOSPADM

## 2018-11-26 RX ORDER — ATORVASTATIN CALCIUM 20 MG/1
40 TABLET, FILM COATED ORAL DAILY
Status: DISCONTINUED | OUTPATIENT
Start: 2018-11-26 | End: 2018-11-27 | Stop reason: HOSPADM

## 2018-11-26 RX ORDER — BUPIVACAINE HCL/EPINEPHRINE 0.25-.0005
VIAL (ML) INJECTION
Status: DISCONTINUED | OUTPATIENT
Start: 2018-11-26 | End: 2018-11-26 | Stop reason: HOSPADM

## 2018-11-26 RX ORDER — MUPIROCIN 20 MG/G
1 OINTMENT TOPICAL 2 TIMES DAILY
Status: DISCONTINUED | OUTPATIENT
Start: 2018-11-26 | End: 2018-11-27 | Stop reason: HOSPADM

## 2018-11-26 RX ORDER — SODIUM CHLORIDE, SODIUM LACTATE, POTASSIUM CHLORIDE, CALCIUM CHLORIDE 600; 310; 30; 20 MG/100ML; MG/100ML; MG/100ML; MG/100ML
INJECTION, SOLUTION INTRAVENOUS CONTINUOUS
Status: DISCONTINUED | OUTPATIENT
Start: 2018-11-26 | End: 2018-11-26

## 2018-11-26 RX ORDER — CEFAZOLIN SODIUM 1 G/3ML
3 INJECTION, POWDER, FOR SOLUTION INTRAMUSCULAR; INTRAVENOUS
Status: COMPLETED | OUTPATIENT
Start: 2018-11-26 | End: 2018-11-26

## 2018-11-26 RX ORDER — LIDOCAINE HYDROCHLORIDE 10 MG/ML
0.5 INJECTION, SOLUTION EPIDURAL; INFILTRATION; INTRACAUDAL; PERINEURAL ONCE
Status: DISCONTINUED | OUTPATIENT
Start: 2018-11-26 | End: 2018-11-26 | Stop reason: HOSPADM

## 2018-11-26 RX ORDER — MEPERIDINE HYDROCHLORIDE 50 MG/ML
12.5 INJECTION INTRAMUSCULAR; INTRAVENOUS; SUBCUTANEOUS ONCE AS NEEDED
Status: DISCONTINUED | OUTPATIENT
Start: 2018-11-26 | End: 2018-11-26 | Stop reason: HOSPADM

## 2018-11-26 RX ORDER — FENTANYL CITRATE 50 UG/ML
25 INJECTION, SOLUTION INTRAMUSCULAR; INTRAVENOUS EVERY 5 MIN PRN
Status: COMPLETED | OUTPATIENT
Start: 2018-11-26 | End: 2018-11-26

## 2018-11-26 RX ORDER — FENTANYL CITRATE 50 UG/ML
INJECTION, SOLUTION INTRAMUSCULAR; INTRAVENOUS
Status: DISCONTINUED | OUTPATIENT
Start: 2018-11-26 | End: 2018-11-26

## 2018-11-26 RX ORDER — DOCUSATE SODIUM 100 MG/1
100 CAPSULE, LIQUID FILLED ORAL EVERY 12 HOURS
Status: DISCONTINUED | OUTPATIENT
Start: 2018-11-26 | End: 2018-11-27 | Stop reason: HOSPADM

## 2018-11-26 RX ORDER — FOLIC ACID 1 MG/1
1 TABLET ORAL DAILY
Status: DISCONTINUED | OUTPATIENT
Start: 2018-11-26 | End: 2018-11-27 | Stop reason: HOSPADM

## 2018-11-26 RX ORDER — KETOROLAC TROMETHAMINE 30 MG/ML
INJECTION, SOLUTION INTRAMUSCULAR; INTRAVENOUS
Status: DISCONTINUED | OUTPATIENT
Start: 2018-11-26 | End: 2018-11-26 | Stop reason: HOSPADM

## 2018-11-26 RX ORDER — CELECOXIB 200 MG/1
200 CAPSULE ORAL 2 TIMES DAILY
Status: DISCONTINUED | OUTPATIENT
Start: 2018-11-26 | End: 2018-11-27 | Stop reason: HOSPADM

## 2018-11-26 RX ORDER — PROPOFOL 10 MG/ML
VIAL (ML) INTRAVENOUS
Status: DISCONTINUED | OUTPATIENT
Start: 2018-11-26 | End: 2018-11-26

## 2018-11-26 RX ORDER — POLYETHYLENE GLYCOL 3350 17 G/17G
17 POWDER, FOR SOLUTION ORAL DAILY
Status: DISCONTINUED | OUTPATIENT
Start: 2018-11-27 | End: 2018-11-27 | Stop reason: HOSPADM

## 2018-11-26 RX ORDER — FAMOTIDINE 20 MG/1
20 TABLET, FILM COATED ORAL 2 TIMES DAILY
Status: DISCONTINUED | OUTPATIENT
Start: 2018-11-26 | End: 2018-11-27 | Stop reason: HOSPADM

## 2018-11-26 RX ORDER — HYDROMORPHONE HYDROCHLORIDE 1 MG/ML
0.5 INJECTION, SOLUTION INTRAMUSCULAR; INTRAVENOUS; SUBCUTANEOUS EVERY 4 HOURS PRN
Status: DISCONTINUED | OUTPATIENT
Start: 2018-11-26 | End: 2018-11-27 | Stop reason: HOSPADM

## 2018-11-26 RX ORDER — OXYCODONE HYDROCHLORIDE 5 MG/1
10 TABLET ORAL EVERY 4 HOURS PRN
Status: DISCONTINUED | OUTPATIENT
Start: 2018-11-26 | End: 2018-11-27 | Stop reason: HOSPADM

## 2018-11-26 RX ORDER — PHENYLEPHRINE HYDROCHLORIDE 10 MG/ML
INJECTION INTRAVENOUS
Status: DISCONTINUED | OUTPATIENT
Start: 2018-11-26 | End: 2018-11-26

## 2018-11-26 RX ORDER — EPHEDRINE SULFATE 50 MG/ML
INJECTION, SOLUTION INTRAVENOUS
Status: DISCONTINUED | OUTPATIENT
Start: 2018-11-26 | End: 2018-11-26

## 2018-11-26 RX ORDER — ACETAMINOPHEN 10 MG/ML
1000 INJECTION, SOLUTION INTRAVENOUS
Status: DISCONTINUED | OUTPATIENT
Start: 2018-11-26 | End: 2018-11-26

## 2018-11-26 RX ORDER — DIPHENHYDRAMINE HYDROCHLORIDE 50 MG/ML
25 INJECTION INTRAMUSCULAR; INTRAVENOUS EVERY 8 HOURS PRN
Status: DISCONTINUED | OUTPATIENT
Start: 2018-11-26 | End: 2018-11-26

## 2018-11-26 RX ORDER — BUPROPION HYDROCHLORIDE 150 MG/1
150 TABLET, EXTENDED RELEASE ORAL DAILY
Status: DISCONTINUED | OUTPATIENT
Start: 2018-11-26 | End: 2018-11-27 | Stop reason: HOSPADM

## 2018-11-26 RX ORDER — ONDANSETRON 2 MG/ML
8 INJECTION INTRAMUSCULAR; INTRAVENOUS EVERY 8 HOURS PRN
Status: DISCONTINUED | OUTPATIENT
Start: 2018-11-26 | End: 2018-11-27 | Stop reason: HOSPADM

## 2018-11-26 RX ORDER — METOPROLOL SUCCINATE 50 MG/1
50 TABLET, EXTENDED RELEASE ORAL DAILY
Status: DISCONTINUED | OUTPATIENT
Start: 2018-11-26 | End: 2018-11-27 | Stop reason: HOSPADM

## 2018-11-26 RX ORDER — SODIUM CHLORIDE 0.9 % (FLUSH) 0.9 %
3 SYRINGE (ML) INJECTION
Status: DISCONTINUED | OUTPATIENT
Start: 2018-11-26 | End: 2018-11-27 | Stop reason: HOSPADM

## 2018-11-26 RX ORDER — PROPOFOL 10 MG/ML
VIAL (ML) INTRAVENOUS CONTINUOUS PRN
Status: DISCONTINUED | OUTPATIENT
Start: 2018-11-26 | End: 2018-11-26

## 2018-11-26 RX ORDER — OXYCODONE HYDROCHLORIDE 5 MG/1
5 TABLET ORAL EVERY 4 HOURS PRN
Status: DISCONTINUED | OUTPATIENT
Start: 2018-11-26 | End: 2018-11-27 | Stop reason: HOSPADM

## 2018-11-26 RX ORDER — HYDROMORPHONE HYDROCHLORIDE 2 MG/ML
0.4 INJECTION, SOLUTION INTRAMUSCULAR; INTRAVENOUS; SUBCUTANEOUS EVERY 5 MIN PRN
Status: DISCONTINUED | OUTPATIENT
Start: 2018-11-26 | End: 2018-11-26 | Stop reason: HOSPADM

## 2018-11-26 RX ORDER — BACITRACIN 50000 [IU]/1
INJECTION, POWDER, FOR SOLUTION INTRAMUSCULAR
Status: DISCONTINUED | OUTPATIENT
Start: 2018-11-26 | End: 2018-11-26 | Stop reason: HOSPADM

## 2018-11-26 RX ORDER — DIPHENHYDRAMINE HCL 25 MG
25 CAPSULE ORAL EVERY 8 HOURS PRN
Status: DISCONTINUED | OUTPATIENT
Start: 2018-11-26 | End: 2018-11-27 | Stop reason: HOSPADM

## 2018-11-26 RX ORDER — ACETAMINOPHEN 10 MG/ML
1000 INJECTION, SOLUTION INTRAVENOUS EVERY 8 HOURS
Status: DISCONTINUED | OUTPATIENT
Start: 2018-11-26 | End: 2018-11-27 | Stop reason: HOSPADM

## 2018-11-26 RX ORDER — ONDANSETRON 2 MG/ML
4 INJECTION INTRAMUSCULAR; INTRAVENOUS DAILY PRN
Status: DISCONTINUED | OUTPATIENT
Start: 2018-11-26 | End: 2018-11-26 | Stop reason: HOSPADM

## 2018-11-26 RX ORDER — ROPIVACAINE HYDROCHLORIDE 5 MG/ML
INJECTION, SOLUTION EPIDURAL; INFILTRATION; PERINEURAL
Status: COMPLETED | OUTPATIENT
Start: 2018-11-26 | End: 2018-11-26

## 2018-11-26 RX ORDER — BISACODYL 10 MG
10 SUPPOSITORY, RECTAL RECTAL DAILY PRN
Status: DISCONTINUED | OUTPATIENT
Start: 2018-11-26 | End: 2018-11-27 | Stop reason: HOSPADM

## 2018-11-26 RX ORDER — DEXTROSE MONOHYDRATE AND SODIUM CHLORIDE 5; .9 G/100ML; G/100ML
INJECTION, SOLUTION INTRAVENOUS CONTINUOUS
Status: DISCONTINUED | OUTPATIENT
Start: 2018-11-26 | End: 2018-11-27 | Stop reason: HOSPADM

## 2018-11-26 RX ORDER — ASPIRIN 325 MG
325 TABLET ORAL EVERY 12 HOURS
Status: DISCONTINUED | OUTPATIENT
Start: 2018-11-27 | End: 2018-11-27 | Stop reason: HOSPADM

## 2018-11-26 RX ORDER — ACETAMINOPHEN 10 MG/ML
INJECTION, SOLUTION INTRAVENOUS
Status: DISCONTINUED | OUTPATIENT
Start: 2018-11-26 | End: 2018-11-26

## 2018-11-26 RX ORDER — OXYCODONE HYDROCHLORIDE 5 MG/1
5 TABLET ORAL
Status: DISCONTINUED | OUTPATIENT
Start: 2018-11-26 | End: 2018-11-26 | Stop reason: HOSPADM

## 2018-11-26 RX ORDER — GLYCOPYRROLATE 0.2 MG/ML
INJECTION INTRAMUSCULAR; INTRAVENOUS
Status: DISCONTINUED | OUTPATIENT
Start: 2018-11-26 | End: 2018-11-26

## 2018-11-26 RX ORDER — CEFAZOLIN SODIUM 2 G/50ML
2 SOLUTION INTRAVENOUS
Status: COMPLETED | OUTPATIENT
Start: 2018-11-26 | End: 2018-11-27

## 2018-11-26 RX ADMIN — OXYCODONE HYDROCHLORIDE 10 MG: 5 TABLET ORAL at 10:11

## 2018-11-26 RX ADMIN — HYDROMORPHONE HYDROCHLORIDE 0.4 MG: 2 INJECTION INTRAMUSCULAR; INTRAVENOUS; SUBCUTANEOUS at 03:11

## 2018-11-26 RX ADMIN — GLYCOPYRROLATE 0.4 MG: 0.2 INJECTION, SOLUTION INTRAMUSCULAR; INTRAVENOUS at 12:11

## 2018-11-26 RX ADMIN — PHENYLEPHRINE HYDROCHLORIDE 150 MCG: 10 INJECTION INTRAVENOUS at 12:11

## 2018-11-26 RX ADMIN — OXYCODONE HYDROCHLORIDE 10 MG: 5 TABLET ORAL at 06:11

## 2018-11-26 RX ADMIN — PROPOFOL 50 MCG/KG/MIN: 10 INJECTION, EMULSION INTRAVENOUS at 11:11

## 2018-11-26 RX ADMIN — PHENYLEPHRINE HYDROCHLORIDE 200 MCG: 10 INJECTION INTRAVENOUS at 12:11

## 2018-11-26 RX ADMIN — DEXTROSE AND SODIUM CHLORIDE: 5; .9 INJECTION, SOLUTION INTRAVENOUS at 06:11

## 2018-11-26 RX ADMIN — ACETAMINOPHEN 1000 MG: 10 INJECTION, SOLUTION INTRAVENOUS at 11:11

## 2018-11-26 RX ADMIN — MUPIROCIN 1 G: 20 OINTMENT TOPICAL at 08:11

## 2018-11-26 RX ADMIN — ACETAMINOPHEN 1000 MG: 10 INJECTION, SOLUTION INTRAVENOUS at 09:11

## 2018-11-26 RX ADMIN — CELECOXIB 200 MG: 200 CAPSULE ORAL at 08:11

## 2018-11-26 RX ADMIN — OXYCODONE HYDROCHLORIDE 5 MG: 5 TABLET ORAL at 01:11

## 2018-11-26 RX ADMIN — CEFAZOLIN 3 G: 330 INJECTION, POWDER, FOR SOLUTION INTRAMUSCULAR; INTRAVENOUS at 11:11

## 2018-11-26 RX ADMIN — FENTANYL CITRATE 25 MCG: 50 INJECTION, SOLUTION INTRAMUSCULAR; INTRAVENOUS at 01:11

## 2018-11-26 RX ADMIN — EPHEDRINE SULFATE 10 MG: 50 INJECTION INTRAMUSCULAR; INTRAVENOUS; SUBCUTANEOUS at 12:11

## 2018-11-26 RX ADMIN — EPHEDRINE SULFATE 20 MG: 50 INJECTION INTRAMUSCULAR; INTRAVENOUS; SUBCUTANEOUS at 11:11

## 2018-11-26 RX ADMIN — FOLIC ACID 1 MG: 1 TABLET ORAL at 07:11

## 2018-11-26 RX ADMIN — MIDAZOLAM HYDROCHLORIDE 2 MG: 1 INJECTION, SOLUTION INTRAMUSCULAR; INTRAVENOUS at 11:11

## 2018-11-26 RX ADMIN — PHENYLEPHRINE HYDROCHLORIDE 100 MCG: 10 INJECTION INTRAVENOUS at 01:11

## 2018-11-26 RX ADMIN — ROPIVACAINE HYDROCHLORIDE 3 ML: 5 INJECTION, SOLUTION EPIDURAL; INFILTRATION; PERINEURAL at 11:11

## 2018-11-26 RX ADMIN — BUPROPION HYDROCHLORIDE 150 MG: 150 TABLET, EXTENDED RELEASE ORAL at 07:11

## 2018-11-26 RX ADMIN — PHENYLEPHRINE HYDROCHLORIDE 100 MCG: 10 INJECTION INTRAVENOUS at 12:11

## 2018-11-26 RX ADMIN — ATORVASTATIN CALCIUM 40 MG: 20 TABLET, FILM COATED ORAL at 07:11

## 2018-11-26 RX ADMIN — FAMOTIDINE 20 MG: 20 TABLET ORAL at 08:11

## 2018-11-26 RX ADMIN — SODIUM CHLORIDE, SODIUM LACTATE, POTASSIUM CHLORIDE, AND CALCIUM CHLORIDE: 600; 310; 30; 20 INJECTION, SOLUTION INTRAVENOUS at 10:11

## 2018-11-26 RX ADMIN — CEFAZOLIN SODIUM 2 G: 2 SOLUTION INTRAVENOUS at 07:11

## 2018-11-26 RX ADMIN — FENTANYL CITRATE 100 MCG: 50 INJECTION, SOLUTION INTRAMUSCULAR; INTRAVENOUS at 11:11

## 2018-11-26 RX ADMIN — VANCOMYCIN HYDROCHLORIDE 2000 MG: 100 INJECTION, POWDER, LYOPHILIZED, FOR SOLUTION INTRAVENOUS at 10:11

## 2018-11-26 RX ADMIN — PROPOFOL 50 MG: 10 INJECTION, EMULSION INTRAVENOUS at 11:11

## 2018-11-26 RX ADMIN — TRANEXAMIC ACID 2000 MG: 100 INJECTION, SOLUTION INTRAVENOUS at 11:11

## 2018-11-26 RX ADMIN — DOCUSATE SODIUM 100 MG: 100 CAPSULE, LIQUID FILLED ORAL at 08:11

## 2018-11-26 RX ADMIN — SODIUM CHLORIDE, SODIUM LACTATE, POTASSIUM CHLORIDE, AND CALCIUM CHLORIDE: 600; 310; 30; 20 INJECTION, SOLUTION INTRAVENOUS at 12:11

## 2018-11-26 NOTE — CONSULTS
Consult Note      IM  Consult Requested By: Jorge Luis Alejandra MD  Reason for Consult: osteoarthritis, HTN, RA, COPD, CAD, CHF, h/o Afib, and open wound    SUBJECTIVE:     History of Present Illness:   63 y.o. male presents with a scheduled right hip repair. Epic and paper chart reviewed prior to eval, seen in PACU.  Patient denies CP,SOB, F,C,N or V.  Requesting to lie on side, discussed with patient that side positioning could compromise his operative hip.  Patient reports being unaware of not being able to position freely postoperatively.  Patient is followed by Dr. Islas for an anal fistula.  The area is being monitored by home health and packed with saline gauze. Patient saw Dr. Islas on Wednesday November 21st and will schedule a follow up.  Evaluation of the chart shows a procedure with Dr. Erazo in May 2018 for excision of a mass of the buttocks.  Patient reports this was the first of three procedures to correct the fistula.    Past Medical History:   Diagnosis Date    AICD (automatic cardioverter/defibrillator) present     Anticoagulant long-term use     Cancer     CHF (congestive heart failure)     COPD (chronic obstructive pulmonary disease)     Coronary artery disease     Emphysema of lung     Hypertension     Rheumatoid arthritis      Past Surgical History:   Procedure Laterality Date    anal fistula excision  08/2018    bladder cancer surgeruy Bilateral     CARDIAC DEFIBRILLATOR PLACEMENT      excision mass buttocks  05/04/2018    EXCISION-MASS-BUTTOCKS Left 5/4/2018    Performed by Rohit [facility]razo MD at [facility] OR    pace maker      defibrillator    TURBT       History reviewed. No pertinent family history.  Social History     Tobacco Use    Smoking status: Current Every Day Smoker     Packs/day: 0.50     Start date: 1972    Smokeless tobacco: Never Used   Substance Use Topics    Alcohol use: Yes     Comment: Seldom    Drug use: Not on file       Review of patient's  allergies indicates:   Allergen Reactions    Lisinopril Rash        Review of Systems:  Constitutional: No fever or chills  Respiratory: No cough or shortness of breath  Cardiovascular: No chest pain or palpitations  Gastrointestinal: No nausea or vomiting  Neurological: No confusion or weakness    OBJECTIVE:     Vital Signs (Most Recent)  Temp: 97.5 °F (36.4 °C) (11/26/18 1319)  Pulse: 61 (11/26/18 1330)  Resp: 20 (11/26/18 1330)  BP: 135/61 (11/26/18 1319)  SpO2: 97 % (11/26/18 1330)    Vital Signs Range (Last 24H):  Temp:  [97.5 °F (36.4 °C)-97.8 °F (36.6 °C)]   Pulse:  [61-62]   Resp:  [18-20]   BP: (135-149)/(61-76)   SpO2:  [96 %-98 %]       Intake/Output Summary (Last 24 hours) at 11/26/2018 1511  Last data filed at 11/26/2018 1225  Gross per 24 hour   Intake 1000 ml   Output --   Net 1000 ml       Physical Exam:  General appearance: Well developed, well nourished  Eyes:  Conjunctivae/corneas clear. PERRL.  Lungs: Normal respiratory effort,   clear to auscultation bilaterally   Heart: Regular rate and rhythm, S1, S2 normal, no murmur, rub or khari.   Abdomen: Soft, non-tender non-distended, rounded; bowel sounds normal; no masses,  no organomegaly  Extremities: No cyanosis or clubbing. No edema.  +2 pulses BLE, abductor pillow in place  Skin: Skin color, texture, turgor normal. No rashes or lesions, dressing right hip CDI, accordion drain right thigh. Dressing to left buttock   Neurologic: Normal strength and tone. No focal numbness or weakness   Radford      Laboratory:    Reviewed    Diagnostic Results:      ASSESSMENT/PLAN:     1. Right hip repair (M16.11): per therapy and ortho teams  2. HTN (I10): meds with hold parameters  3. COPD/Emphysema (J44.9/J43.9): as now  4. CAD (I25.10): Followed Dr. Chacon. Currently asymptomatic.  5. CHF (I50.9): Followed by Dr. Chacon. Appears euvolemic.  6. RA (M06.9): defer, hold plaquenil  7. A-fib (I48.91): per DR. Chacon, monitor on tele. Does have AICD. Plavix held  pre-operatively  8. Anal fistula (K60.3): per Dr. Islas.  Appreciate wound care's input.  9. DVT prophy:  mg BID, TEDs and SCD    Plan: Thanks for consult, See above recommendations and orders. Will follow along.    Appreciate input of wound care and PT to create a position where wound on buttock and hip will both remain uncompromised

## 2018-11-26 NOTE — OP NOTE
[facility]  Operative Report    SUMMARY     Surgery Date: 11/26/2018     Surgeon(s) and Role:     * Jorge Luis Alejandra MD - Primary    Assistant: CINDY Leonard FA    Pre-op Diagnosis:  Primary localized osteoarthrosis of right hip [M16.11]    Post-op Diagnosis:  Post-Op Diagnosis Codes:     1. Primary localized osteoarthrosis of right hip [M16.11]     2. Adduction contracture right hip    Procedure:  1.  Right Total Hip Replacement                      2.  Percutaneous adductor release Right Hip    Anesthesia: Spinal    Description of Procedure: Appropriate consent was signed. The patient understood   and accepted all risks and complications. The patient was brought to the Operating Room after undergoing spinal anesthetic. Radford catheter was placed. The patient was then placed in a lateral decubitus position on a peg board with all bony   prominences padded. Perineal drape was applied. The Operative hip and lower extremity were then prepped and draped in a sterile manner and a mini posterior approach was utilized. Dissection was taken down to fascia, which was incised and   gluteus quoc muscle split in line of its fibers.The Charnley retractor was then   placed and the hip internally rotated. The external rotators and capsule were   taken off as one flap and peeled back to protect the sciatic nerve. It was   tagged with #5 Ethibond suture. Leg was then levelled and 2 pins were placed, 1  in the greater trochanter and 1 in the iliac crest and distance measured 9.5  cm. Pin was then removed from greater trochanter and hip dislocated. A femoral  neck osteotomy was performed in 25 mm above the lesser trochanter as   templated on preoperative x-rays. Femoral head was removed and proximal femur   was exposed. It was opened up with the cookie cutter, starter reamer and   lateralizing reamer. Trabecular metal reamers were utilized up to 14 mm and   broaching was performed to 14 mm. A thrombin-soaked  sponge is placed in the   proximal femur and attention was then directed to the acetabulum.    Labrum and soft tissue were excised and reaming was begun with a 50 mm reamer   and progressed to 54 mm. A 54 mm press-fit trabecular metal cup with cluster   holes was then impacted obtaining excellent fixation. 2 dome screws were placed  enhancing fixation. A 36 mm neutral highly cross-linked polyethylene liner was  then snapped in the place and checked for loosening. Osteophytes were removed   from around the acetabulum.    Attention was then directed back to the proximal femur where the trial 14 mm   trabecular metal broach was placed and trials were performed. A standard neck   was required along with a 36 mm head plus 3.5 mm neck. Leg length measured 10.5   cm. Broach is then removed and a 14 mm trabecular metal stem was then impacted in the proximal femur obtaining excellent fixation. A 36 mm   ceramic head +3.5 mm neck was then impacted on the dried trunnion   relocated brought through full range of motion and found to be quite stable.   The hip was then washed out copiously with antibiotic solution through the   Pulsavac. The external rotators and capsule were reattached to trochanteric   attachment through drill holes utilizing #5 Ethibond suture. Exparel cocktail   was injected into the fascia and subcutaneous tissue. Fascia was closed with a   running #2 Quill suture over a drain. Subcutaneous closure was obtained with #1 and 2-0   Vicryl. Skin was then closed with staples and a sterile compressive dressing   was applied.   He was placed supine and the right groin was prepped.  A #12 blade was used to perform a percutaneous adductor release.  A sterile dressing was applied. The patient was placed in abduction pillow and brought to Recovery  Room in good condition.    Estimated Blood Loss: 300cc         Specimens:   Specimen (12h ago, onward)    None

## 2018-11-26 NOTE — ANESTHESIA POSTPROCEDURE EVALUATION
Anesthesia Post Evaluation    Patient: Facundo Lord     Procedure(s) Performed: Procedure(s) (LRB):  ARTHROPLASTY, HIP (Right)  RELEASE, TENDON, FLEXOR, HIP, OR HIP ADDUCTOR TENOTOMY (Right)    Final Anesthesia Type: spinal  Patient location during evaluation: PACU  Patient participation: Yes- Able to Participate  Level of consciousness: awake and alert  Post-procedure vital signs: reviewed and stable  Pain management: adequate  Airway patency: patent  PONV status at discharge: No PONV  Anesthetic complications: no      Cardiovascular status: hemodynamically stable  Respiratory status: unassisted  Hydration status: euvolemic  Follow-up not needed.        Visit Vitals  /61 (BP Location: Right arm, Patient Position: Lying)   Pulse 61   Temp 36.4 °C (97.5 °F) (Oral)   Resp 20   Ht 6' (1.829 m)   Wt (!) 156.1 kg (344 lb 0.5 oz)   SpO2 97%   BMI 46.66 kg/m²       Pain/Carla Score: Pain Assessment Performed: Yes (11/26/2018 10:31 AM)  Presence of Pain: complains of pain/discomfort (11/26/2018 10:31 AM)  Pain Rating Prior to Med Admin: 3 (11/26/2018  1:37 PM)

## 2018-11-26 NOTE — TRANSFER OF CARE
Anesthesia Transfer of Care Note    Patient: Facundo Lord Jr.    Procedure(s) Performed: Procedure(s) (LRB):  ARTHROPLASTY, HIP (Right)  RELEASE, TENDON, FLEXOR, HIP, OR HIP ADDUCTOR TENOTOMY (Right)    Patient location: PACU    Anesthesia Type: spinal    Transport from OR: Transported from OR on 2-3 L/min O2 by NC with adequate spontaneous ventilation    Post pain: adequate analgesia    Post assessment: no apparent anesthetic complications    Post vital signs: stable    Level of consciousness: awake    Nausea/Vomiting: no nausea/vomiting    Complications: none    Transfer of care protocol was followed      Last vitals:   Visit Vitals  BP (!) 149/76   Pulse 62   Temp 36.6 °C (97.8 °F) (Oral)   Resp 18   Ht 6' (1.829 m)   Wt (!) 156.1 kg (344 lb 0.5 oz)   SpO2 96%   BMI 46.66 kg/m²

## 2018-11-26 NOTE — ANESTHESIA PROCEDURE NOTES
Spinal    Diagnosis: OA R HIP  Patient location during procedure: holding area  Start time: 11/26/2018 11:26 AM  Timeout: 11/26/2018 11:26 AM  End time: 11/26/2018 11:33 AM  Staffing  Anesthesiologist: Reynaldo Cárdenas MD  Performed: anesthesiologist   Preanesthetic Checklist  Completed: patient identified, site marked, surgical consent, pre-op evaluation, timeout performed, IV checked, risks and benefits discussed and monitors and equipment checked  Spinal Block  Patient position: sitting  Prep: ChloraPrep  Patient monitoring: heart rate, cardiac monitor and continuous pulse ox  Approach: right paramedian  Location: L3-4  Injection technique: single shot  CSF Fluid: clear free-flowing CSF  Needle  Needle type: pencil-tip   Needle gauge: 22 G  Needle length: 3.5 in  Additional Documentation: incremental injection, negative aspiration for heme and no paresthesia on injection  Needle localization: anatomical landmarks  Assessment  Sensory level: T5   Dermatomal levels determined by alcohol wipe and pinch or prick  Ease of block: easy  Patient's tolerance of the procedure: comfortable throughout block and no complaints

## 2018-11-26 NOTE — PLAN OF CARE
[facility]       [address]       [facility] [address]       [phone]               [facility] Orthopedic Discharge Orders    Home [place]           Expected Discharge Date: 11/27    Diagnoses:  Post-op  right hip(s) replacement    Patient is homebound due to:   Pt requires home health services due to taxing effort to leave the home as a result of immobility from Post-op right hip(s) replacement    Weight Bearing Status:   full weight bearing: right leg     Posterior Hip Precautions for 6 weeks, AVOID:  -Avoid greater than 90 degrees of flexion, internal rotation, and adduction  -Avoid extension, external rotation, and abduction  -Must sleep with hip abduction pillow or regular pillow b/t legs    Physical Therapy   3 times a week for 2 weeks (Call for further orders)  - Ambulate with a rolling walker  - Progress to cane  -Instruct on ROM and strengthening of knee  -Set up for outpt once staples removed and MOD 1 with cane    Wound Care:   If patient is discharged with aqua dory/silver dressing, leave on for 5 days unless saturated border to border, then follow instructions below:  Cleanse with wound cleanser or normal saline and apply island dressing.  If island dressing not available apply gauze and tegaderm.  Change surgical dressing 3 times a week and PRN  in standing position.  Teach patient to change daily if draining.  Pt may shower if surgical dressing is waterproof.. Removal and replacement of dressing after shower only needed if incision is suspected to have gotten wet during shower.  Otherwise change as previously described depending on dressing/drainage. No soaking in the tub or hot tub  for 6 weeks.    Wear  TEDS Bilateral Knee High Stockings for 3  Weeks. OK to remove stockings 1-2 hours/day max if desired.    PT/SN to remove staples 14 days Post-op and apply skin prep and steri-strips.    Cold therapy/Ice: encouraged at least 20 minutes 2-3 times daily or more if desired.   Incision must be kept waterproof while icing.      Contact:  Please contact the nurse practitioner, Shani at [phone] at Ext [phone]. with concerns.  She is in surgery M,W,F so if urgent and needs to be addressed prior to the end of the day call the  and they with contact her in the OR or Clinic.     BLOOD THINNER:    If sent home on Xarelto         -14 days post-op for TKR       -30 days post-op for THR     If sent home home on ASA    325mg   BID x 4 weeks     Once finished with prescribed blood thinner, patients can return to pre-surgical ASA dosage if they took ASA before surgery.     Outpatient Therapy: [facility]    [address] [address]   or  [address] [place] [address]    Call [phone] to schedule appointment  Fax [phone]    If need orders: Call Faviola at Ext [phone]        DME:  - rolling Walker  - 3 in 1 commode  - tub bench / shower chair  - Hip Kit  - Per PT/OT recommendation  - Other:Clarke Alejandra

## 2018-11-27 VITALS
HEART RATE: 78 BPM | OXYGEN SATURATION: 95 % | TEMPERATURE: 98 F | WEIGHT: 315 LBS | RESPIRATION RATE: 18 BRPM | BODY MASS INDEX: 42.66 KG/M2 | HEIGHT: 72 IN | DIASTOLIC BLOOD PRESSURE: 64 MMHG | SYSTOLIC BLOOD PRESSURE: 132 MMHG

## 2018-11-27 LAB
ERYTHROCYTE [DISTWIDTH] IN BLOOD BY AUTOMATED COUNT: 14.3 %
HCT VFR BLD AUTO: 39.3 %
HGB BLD-MCNC: 12.8 G/DL
MCH RBC QN AUTO: 27.8 PG
MCHC RBC AUTO-ENTMCNC: 32.6 G/DL
MCV RBC AUTO: 85 FL
PLATELET # BLD AUTO: 202 K/UL
PMV BLD AUTO: 8.1 FL
RBC # BLD AUTO: 4.6 M/UL
WBC # BLD AUTO: 9.09 K/UL

## 2018-11-27 PROCEDURE — G8987 SELF CARE CURRENT STATUS: HCPCS | Mod: CJ

## 2018-11-27 PROCEDURE — G8989 SELF CARE D/C STATUS: HCPCS | Mod: CJ

## 2018-11-27 PROCEDURE — 97110 THERAPEUTIC EXERCISES: CPT

## 2018-11-27 PROCEDURE — 25000003 PHARM REV CODE 250

## 2018-11-27 PROCEDURE — 94799 UNLISTED PULMONARY SVC/PX: CPT

## 2018-11-27 PROCEDURE — 90471 IMMUNIZATION ADMIN: CPT | Performed by: ORTHOPAEDIC SURGERY

## 2018-11-27 PROCEDURE — 63600175 PHARM REV CODE 636 W HCPCS: Performed by: ORTHOPAEDIC SURGERY

## 2018-11-27 PROCEDURE — 97116 GAIT TRAINING THERAPY: CPT

## 2018-11-27 PROCEDURE — 25000003 PHARM REV CODE 250: Performed by: NURSE PRACTITIONER

## 2018-11-27 PROCEDURE — G8988 SELF CARE GOAL STATUS: HCPCS | Mod: CJ

## 2018-11-27 PROCEDURE — 90686 IIV4 VACC NO PRSV 0.5 ML IM: CPT | Performed by: ORTHOPAEDIC SURGERY

## 2018-11-27 PROCEDURE — G0008 ADMIN INFLUENZA VIRUS VAC: HCPCS | Performed by: ORTHOPAEDIC SURGERY

## 2018-11-27 PROCEDURE — 36415 COLL VENOUS BLD VENIPUNCTURE: CPT

## 2018-11-27 PROCEDURE — 97530 THERAPEUTIC ACTIVITIES: CPT

## 2018-11-27 PROCEDURE — 3E02340 INTRODUCTION OF INFLUENZA VACCINE INTO MUSCLE, PERCUTANEOUS APPROACH: ICD-10-PCS | Performed by: ORTHOPAEDIC SURGERY

## 2018-11-27 PROCEDURE — 97535 SELF CARE MNGMENT TRAINING: CPT

## 2018-11-27 PROCEDURE — 63600175 PHARM REV CODE 636 W HCPCS

## 2018-11-27 PROCEDURE — 85027 COMPLETE CBC AUTOMATED: CPT

## 2018-11-27 PROCEDURE — 94761 N-INVAS EAR/PLS OXIMETRY MLT: CPT

## 2018-11-27 PROCEDURE — 97165 OT EVAL LOW COMPLEX 30 MIN: CPT

## 2018-11-27 RX ORDER — OXYCODONE AND ACETAMINOPHEN 5; 325 MG/1; MG/1
TABLET ORAL
Qty: 60 TABLET | Refills: 0 | Status: SHIPPED | OUTPATIENT
Start: 2018-11-27 | End: 2019-04-23

## 2018-11-27 RX ORDER — ASPIRIN 325 MG
325 TABLET ORAL EVERY 12 HOURS
Refills: 0 | COMMUNITY
Start: 2018-11-27 | End: 2019-04-24 | Stop reason: HOSPADM

## 2018-11-27 RX ADMIN — OXYCODONE HYDROCHLORIDE 10 MG: 5 TABLET ORAL at 02:11

## 2018-11-27 RX ADMIN — FAMOTIDINE 20 MG: 20 TABLET ORAL at 09:11

## 2018-11-27 RX ADMIN — METOPROLOL SUCCINATE 50 MG: 50 TABLET, EXTENDED RELEASE ORAL at 03:11

## 2018-11-27 RX ADMIN — OXYCODONE HYDROCHLORIDE 10 MG: 5 TABLET ORAL at 11:11

## 2018-11-27 RX ADMIN — FOLIC ACID 1 MG: 1 TABLET ORAL at 09:11

## 2018-11-27 RX ADMIN — OXYCODONE HYDROCHLORIDE 10 MG: 5 TABLET ORAL at 07:11

## 2018-11-27 RX ADMIN — CELECOXIB 200 MG: 200 CAPSULE ORAL at 09:11

## 2018-11-27 RX ADMIN — CEFAZOLIN SODIUM 2 G: 2 SOLUTION INTRAVENOUS at 02:11

## 2018-11-27 RX ADMIN — ACETAMINOPHEN 1000 MG: 10 INJECTION, SOLUTION INTRAVENOUS at 06:11

## 2018-11-27 RX ADMIN — ATORVASTATIN CALCIUM 40 MG: 20 TABLET, FILM COATED ORAL at 09:11

## 2018-11-27 RX ADMIN — INFLUENZA A VIRUS A/MICHIGAN/45/2015 X-275 (H1N1) ANTIGEN (FORMALDEHYDE INACTIVATED), INFLUENZA A VIRUS A/SINGAPORE/INFIMH-16-0019/2016 IVR-186 (H3N2) ANTIGEN (FORMALDEHYDE INACTIVATED), INFLUENZA B VIRUS B/PHUKET/3073/2013 ANTIGEN (FORMALDEHYDE INACTIVATED), AND INFLUENZA B VIRUS B/MARYLAND/15/2016 BX-69A ANTIGEN (FORMALDEHYDE INACTIVATED) 0.5 ML: 15; 15; 15; 15 INJECTION, SUSPENSION INTRAMUSCULAR at 03:11

## 2018-11-27 RX ADMIN — BUPROPION HYDROCHLORIDE 150 MG: 150 TABLET, EXTENDED RELEASE ORAL at 09:11

## 2018-11-27 RX ADMIN — MUPIROCIN 1 G: 20 OINTMENT TOPICAL at 09:11

## 2018-11-27 RX ADMIN — LOSARTAN POTASSIUM 50 MG: 50 TABLET, FILM COATED ORAL at 03:11

## 2018-11-27 RX ADMIN — ASPIRIN 325 MG ORAL TABLET 325 MG: 325 PILL ORAL at 09:11

## 2018-11-27 RX ADMIN — DOCUSATE SODIUM 100 MG: 100 CAPSULE, LIQUID FILLED ORAL at 09:11

## 2018-11-27 NOTE — PLAN OF CARE
Updated home health orders forwarded to [facility] - they will resume patient's care. Wendy from [facility] gave approval to pull heavy duty rolling walker & heavy duty bedside commode from our supply - [facility] delivered both to bedside along with long hip kit      11/27/18 1020   Final Note   Assessment Type Final Discharge Note   Anticipated Discharge Disposition Home-Health   What phone number can be called within the next 1-3 days to see how you are doing after discharge? [phone]   Discharge plans and expectations educations in teach back method with documentation complete? Yes   Right Care Referral Info   Post Acute Recommendation Home-care   Facility Name [facility]

## 2018-11-27 NOTE — PLAN OF CARE
"Problem: Physical Therapy Goal  Goal: Physical Therapy Goal  Goals to be met by: 12/10/18      Patient will increase functional independence with mobility by performin. Supine to sit with Contact Guard Assistance  2. Sit to supine with Contact Guard Assistance  3. Sit to stand transfer with Stand-by Assistance  4. Gait  x 150 feet with Stand-by Assistance using HDRW  5.  Pt able to name 3/3 hip precautions.     Outcome: Ongoing (interventions implemented as appropriate)  PT eval.  Pt instructed in hip precautions but required REPEATED cueing to observe.  Pt very anxious to get OOB and stand.  Pt had "boil" on L buttocks that  caused surgery to be canceled this summer.  Mass was excised but wouldn't heal as it was a fistula to anus.  Pt and wife reported healed except 1 small "dot" next to anus.  Other area is healed.  Ortho nurse inspected area and area on L buttock with small opening that she was able to probe with qtip.  Also still with opening next to anus.  Nurse packed both areas while pt stood with PT.  Covered with foam dressing .  Able to amb 100' with RW and CGA and cueing .  Placed in bs chair with 4 pillows  REC:  Home with HH  DME:  Bariatric RW, 3 in 1 and SC(pt is currently using outside chair in shower), extra long hip kit.       "

## 2018-11-27 NOTE — PT/OT/SLP PROGRESS
Physical Therapy Treatment    Patient Name:  Facundo Lord Jr.   MRN:  [id number]    Recommendations:     Discharge Recommendations:  home health OT, home health PT   Discharge Equipment Recommendations: 3-in-1 commode, walker, rolling, hip kit(Bariatric equipment)   Barriers to discharge: None    Assessment:     Facundo Lord Jr. is a 63 y.o. male admitted with a medical diagnosis of Primary osteoarthritis of right hip.  He presents with the following impairments/functional limitations:  impaired functional mobilty, decreased safety awareness, pain, gait instability, impaired endurance, orthopedic precautions, impaired self care skills, decreased lower extremity function.    Patient continues to be impulsive throughout session, requiring moderate-max verbal cues for surgical precautions. Patient is, however, becoming notably more aware of surgical precautions as he was able to self-correct 25% of the time without verbal cues. Patient tolerated 150' of gait training with RW, requiring CGA for safety. Patient was able to recall 3 of 3 surgical precautions in relation to mobility tasks. Patient has most difficulty remembering precautions during bed mobility and turning with gait training.    Rehab Prognosis:  Fair; patient would benefit from acute skilled PT services to address these deficits and reach maximum level of function.      Recent Surgery: Procedure(s) (LRB):  ARTHROPLASTY, HIP (Right)  RELEASE, TENDON, FLEXOR, HIP, OR HIP ADDUCTOR TENOTOMY (Right) 1 Day Post-Op    Plan:     During this hospitalization, patient to be seen BID to address the above listed problems via gait training, therapeutic activities, therapeutic exercises  · Plan of Care Expires:  12/26/18   Plan of Care Reviewed with: patient    Subjective     Communicated with RN prior to session.  Patient found sitting in chair upon PT entry to room, agreeable to treatment.      Chief Complaint: Pain in buttocks  Patient comments/goals:  Patient's wife present for second session today and patient's wife assisted patient with surgical precautions during mobility tasks. Patient stated he feels confident with hygiene care and showering s/p communicating with OT. Patient also agreeable to sleeping in standard height bed until home health PT is able to assist patient while at home.    Pain/Comfort:  · Pain Rating 1: 6/10  · Location - Side 1: Bilateral  · Location - Orientation 1: generalized  · Location 1: (buttocks)  · Pain Addressed 1: Distraction  · Pain Rating Post-Intervention 1: 6/10    Patients cultural, spiritual, Uatsdin conflicts given the current situation: none stated     Objective:     Patient found with: telemetry, peripheral IV, SCD     General Precautions: Standard, fall   Orthopedic Precautions:LLE weight bearing as tolerated, LLE posterior precautions   Braces: N/A     Functional Mobility:  · Bed Mobility:  Verbal cues for techniques to maintain precautions  · Supine to Sit: minimum assistance  · Less impulsive than sit to supine and was better able to follow verbal cues from PT for hand and BLE placement   · Sit to Supine: minimum assistance  · Impulsive as patient swung himself back into bed despite PT cueing him to wait  · Transfers:     · Sit to Stand:  minimum assistance with rolling walker  · Moderate verbal cues provided  · Gait:   · ambulated 150' with SBA, with RW  · No verbal cues needed for step-through pattern  · Verbal cues to  feet while turning   · Balance:   · SBA while patient urinated      AM-PAC 6 CLICK MOBILITY  Turning over in bed (including adjusting bedclothes, sheets and blankets)?: 3  Sitting down on and standing up from a chair with arms (e.g., wheelchair, bedside commode, etc.): 3  Moving from lying on back to sitting on the side of the bed?: 3  Moving to and from a bed to a chair (including a wheelchair)?: 3  Need to walk in hospital room?: 3  Climbing 3-5 steps with a railing?: 3  Basic Mobility  Total Score: 18       Therapeutic Activities and Exercises:  Pt performed supine therapeutic exercises including ankle pumps, quad sets, glute sets x 10 reps with verbal and tactile cues. Patient c/o pain throughout exercises therefore exercises took an extended amount of time to execute. Although patient benefits from verbal cues, patient is most responsive to tactile cueing regarding his exercises.      Patient left sitting up EOB  with all lines intact, call button in reach, RN notified and wife present..    GOALS:   Multidisciplinary Problems     Physical Therapy Goals        Problem: Physical Therapy Goal    Goal Priority Disciplines Outcome Goal Variances Interventions   Physical Therapy Goal     PT, PT/OT Ongoing (interventions implemented as appropriate)     Description:  Goals to be met by: 12/10/18      Patient will increase functional independence with mobility by performin. Supine to sit with Contact Guard Assistance  2. Sit to supine with Contact Guard Assistance  3. Sit to stand transfer with Stand-by Assistance  4. Gait  x 150 feet with Stand-by Assistance using HDRW - Goal Achieved 18  5.  Pt able to name 3/3 hip precautions -- Goal Achieved 18                        Time Tracking:     PT Received On: 18  PT Start Time: 1356     PT Stop Time: 1429  PT Total Time (min): 33 min     Billable Minutes: Gait Training 15 and Therapeutic Activity 18    Treatment Type: Treatment  PT/PTA: PT     PTA Visit Number: 0     Viviana Long, PT  2018

## 2018-11-27 NOTE — PT/OT/SLP EVAL
Physical Therapy Evaluation and treatment    Patient Name:  Facundo Lord Jr.   MRN:  [id number]    Recommendations:     Discharge Recommendations:  home with home health, home health PT, home health OT   Discharge Equipment Recommendations: 3-in-1 commode, hip kit, shower chair, walker, rolling(all bariatric DME and extra long hip kit)   Barriers to discharge: Decreased caregiver support    Assessment:     Facundo Lord Jr. is a 63 y.o. male admitted with a medical diagnosis of Primary osteoarthritis of right hip.  He presents with the following impairments/functional limitations:  weakness, impaired endurance, impaired functional mobilty, impaired self care skills, gait instability, impaired balance, pain, decreased safety awareness, decreased lower extremity function, decreased ROM, orthopedic precautions, impaired skin .  Pt required repeated cueing to observe hip precautions.  Will need both sessions tomorrow before dc. Still Has open wound/fistula on L buttock and another next to anus-    Rehab Prognosis:  good; patient would benefit from acute skilled PT services to address these deficits and reach maximum level of function.      Recent Surgery: Procedure(s) (LRB):  ARTHROPLASTY, HIP (Right)  RELEASE, TENDON, FLEXOR, HIP, OR HIP ADDUCTOR TENOTOMY (Right) Day of Surgery    Plan:     During this hospitalization, patient to be seen BID to address the above listed problems via gait training, therapeutic activities, therapeutic exercises  · Plan of Care Expires:  12/26/18   Plan of Care Reviewed with: patient, spouse, son    Subjective     Communicated with nurse prior to session.  Patient found restless in bed -rolling to L side  upon PT entry to room, agreeable to evaluation.      Chief Complaint: L buttocks pain  Patient comments/goals: My hip doesn't hurt.  I need to get up and lean on that counter over there.  I didn't know I was going to have any restrictions after surgery.      Pain/Comfort:  · Pain  "Rating 1: 0/10  · Location - Side 1: Left  · Location - Orientation 1: generalized  · Location 1: hip  · Pain Addressed 1: Pre-medicate for activity, Reposition, Distraction, Cessation of Activity, Nurse notified  · Pain Rating Post-Intervention 1: 2/10  · Pain Rating 2: other (see comments)("13")  · Location - Side 2: Left  · Location - Orientation 2: generalized  · Location 2: (buttocks)  · Pain Addressed 2: Pre-medicate for activity, Reposition, Distraction, Cessation of Activity, Nurse notified  · Pain Rating Post-Intervention 2: other (see comments)("11")    Patients cultural, spiritual, Yarsanism conflicts given the current situation: none stated     Living Environment:  Pt lives with spouse and disabled son in 1 story house with ramp to enter .  Has handicap shower with grab bar BUT HAS OUTSIDE CHAIR IN SHOWER .  Handicap toilet with grab bar  Prior to admission, patients level of function was Mod I for AMB with SPC-intermittently in house and used for community amb.  Pt was driving, retired.  Patient has the following equipment: cane, straight.  DME owned (not currently used): none.  Upon discharge, patient will have limited  assistance from wife..  Wife states she took off Mon and Tues but must go back to work on Wed -"if not I'll get fired"  .  Suggested wife work on Tues and come get patient after she gets off work and be off Wed.  Pt will need to ask extended family if able to assist.     Objective:     Patient found with: peripheral IV, braswell catheter, hemovac, hip abduction pillow, SCD     General Precautions: Standard, fall   Orthopedic Precautions:LLE weight bearing as tolerated, LLE posterior precautions   Braces: N/A     Exams:  · Cognitive Exam:  Patient is oriented to Person, Place, Time and Situation  · Gross Motor Coordination:  decreased in timing  · Postural Exam:  Patient presented with the following abnormalities:    · -       Rounded shoulders  · -       Forward head  · -       Abnormal " "trunk flexion  · Sensation:    · -       Intact  light/touch BLE  · Skin Integrity/Edema:      · -       Skin integrity: pt with hip incision bandaged , also 2 wounds on L buttocks and adjacent to anus (previous fistula)  · -       Edema: L HIp and thigh  · RLE ROM: Deficits: limited 2* hip precautions, ankle WFL  · RLE Strength: Deficits: fair quad contraction, needs assist to move leg, ankle WFL  · LLE ROM: WFL  · LLE Strength: WFL    Functional Mobility:  · Bed Mobility:     · Supine to Sit: moderate assistance and cueing for hip precautions  · Transfers:     · Sit to Stand:  min to mod depending on surface height of bed.   with rolling walker and cueing to observe precautions  · Gait: pt amb 100' with RW and CGA.  cueing for sequence.  decreased carlitos and step and stride length on L- intially step to gait  · Balance: fair standing    AM-PAC 6 CLICK MOBILITY  Total Score:14       Therapeutic Activities and Exercises:   PT eval.  Pt instructed in hip precautions but required REPEATED cueing to observe.  Pt very anxious to get OOB and stand.  Pt had "boil" on L buttocks that  caused surgery to be canceled this summer.  Mass was excised but wouldn't heal as it was a fistula to anus.  Pt and wife reported healed except 1 small "dot" next to anus and that other area is healed.  once PT assisted pt into standing Ortho nurse inspected area.   area on L buttock with small opening that she was able to probe with qtip.  Also still with opening next to anus.  Nurse packed both areas while pt stood with PT.  Covered with foam dressing .  Able to amb 100' with RW and CGA and cueing .  Placed in bs chair with 4 pillows    Patient left up in chair with all lines intact, call button in reach, nurse notified and wife and son present.    GOALS:   Multidisciplinary Problems     Physical Therapy Goals        Problem: Physical Therapy Goal    Goal Priority Disciplines Outcome Goal Variances Interventions   Physical Therapy Goal     " PT, PT/OT Ongoing (interventions implemented as appropriate)     Description:  Goals to be met by: 12/10/18      Patient will increase functional independence with mobility by performin. Supine to sit with Contact Guard Assistance  2. Sit to supine with Contact Guard Assistance  3. Sit to stand transfer with Stand-by Assistance  4. Gait  x 150 feet with Stand-by Assistance using HDRW  5.  Pt able to name 3/3 hip precautions.                       History:     Past Medical History:   Diagnosis Date    AICD (automatic cardioverter/defibrillator) present     Anticoagulant long-term use     Cancer     CHF (congestive heart failure)     COPD (chronic obstructive pulmonary disease)     Coronary artery disease     Emphysema of lung     Hypertension     Rheumatoid arthritis        Past Surgical History:   Procedure Laterality Date    anal fistula excision  2018    bladder cancer surgeruy Bilateral     CARDIAC DEFIBRILLATOR PLACEMENT      excision mass buttocks  2018    EXCISION-MASS-BUTTOCKS Left 2018    Performed by Rohit Erazo [facility]    pace maker      defibrillator    TURBT         Clinical Decision Making:     History  Co-morbidities and personal factors that may impact the plan of care Examination  Body Structures and Functions, activity limitations and participation restrictions that may impact the plan of care Clinical Presentation   Decision Making/ Complexity Score   Co-morbidities:   [] Time since onset of injury / illness / exacerbation  [] Status of current condition  []Patient's cognitive status and safety concerns    [] Multiple Medical Problems (see med hx)  Personal Factors:   [] Patient's age  [] Prior Level of function   [] Patient's home situation (environment and family support)  [] Patient's level of motivation  [] Expected progression of patient      HISTORY:(criteria)    [] 53558 - no personal factors/history    [] 99844 - has 1-2 personal  factor/comorbidity     [] 01926 - has >3 personal factor/comorbidity     Body Regions:  [] Objective examination findings  [] Head     []  Neck  [] Trunk   [] Upper Extremity  [] Lower Extremity    Body Systems:  [] For communication ability, affect, cognition, language, and learning style: the assessment of the ability to make needs known, consciousness, orientation (person, place, and time), expected emotional /behavioral responses, and learning preferences (eg, learning barriers, education  needs)  [] For the neuromuscular system: a general assessment of gross coordinated movement (eg, balance, gait, locomotion, transfers, and transitions) and motor function  (motor control and motor learning)  [] For the musculoskeletal system: the assessment of gross symmetry, gross range of motion, gross strength, height, and weight  [] For the integumentary system: the assessment of pliability(texture), presence of scar formation, skin color, and skin integrity  [] For cardiovascular/pulmonary system: the assessment of heart rate, respiratory rate, blood pressure, and edema     Activity limitations:    [] Patient's cognitive status and saf ety concerns          [] Status of current condition      [] Weight bearing restriction  [] Cardiopulmunary Restriction    Participation Restrictions:   [] Goals and goal agreement with the patient     [] Rehab potential (prognosis) and probable outcome      Examination of Body System: (criteria)    [] 66582 - addressing 1-2 elements    [] 64603 - addressing a total of 3 or more elements     [] 42286 -  Addressing a total of 4 or more elements         Clinical Presentation: (criteria)  Choose one     On examination of body system using standardized tests and measures patient presents with (CHOOSE ONE) elements from any of the following: body structures and functions, activity limitations, and/or participation restrictions.  Leading to a clinical presentation that is considered (CHOOSE  ONE)                              Clinical Decision Making  (Eval Complexity):  Choose One     Time Tracking:     PT Received On: 11/26/18  PT Start Time: 1755     PT Stop Time: 1905  PT Total Time (min): 70 min     Billable Minutes: Evaluation 15, Gait Training 14 and Therapeutic Activity 39      Nay Cramer, PT  11/26/2018

## 2018-11-27 NOTE — PROGRESS NOTES
Progress Note  Orthopedics    Admit Date: 11/26/2018   Patient ID: Facundo Lord Jr. is a 63 y.o. male. POD #1 R THR.  Doing well.  Has a buttock wound which he is presently getting treatment for. Dressing dry, drain Dced.  Amb 140 ft. Yesterday.  OK for DC.            Jorge Luis NOAH Millet      Vital Sign (recent):  BP (!) 101/52 (Patient Position: Lying)   Pulse 82   Temp 97.9 °F (36.6 °C) (Oral)   Resp 18   Ht 6' (1.829 m)   Wt (!) 175.2 kg (386 lb 3.9 oz)   SpO2 95%   BMI 52.38 kg/m²       Laboratory:    CBC:   Recent Labs   Lab 11/27/18  0600   WBC 9.09   RBC 4.60   HGB 12.8*   HCT 39.3*      MCV 85   MCH 27.8   MCHC 32.6       CMP: No results for input(s): GLU, CALCIUM, ALBUMIN, PROT, NA, K, CO2, CL, BUN, CREATININE, ALKPHOS, ALT, AST, BILITOT in the last 168 hours.        Intake/Output Summary (Last 24 hours) at 11/27/2018 0831  Last data filed at 11/27/2018 0800  Gross per 24 hour   Intake 1480 ml   Output 1730 ml   Net -250 ml         Current Medications:   acetaminophen  1,000 mg Intravenous Q8H    aspirin  325 mg Oral Q12H    atorvastatin  40 mg Oral Daily    buPROPion  150 mg Oral Daily    celecoxib  200 mg Oral BID    docusate sodium  100 mg Oral Q12H    famotidine  20 mg Oral BID    folic acid  1 mg Oral Daily    losartan  50 mg Oral Daily    metoprolol succinate  50 mg Oral Daily    mupirocin  1 g Nasal BID    polyethylene glycol  17 g Oral Daily       Continuous Infusions:   dextrose 5 % and 0.9 % NaCl 100 mL/hr at 11/26/18 1800     PRN Meds:.bisacodyl, diphenhydrAMINE, HYDROmorphone, influenza, ondansetron, oxyCODONE, oxyCODONE, promethazine (PHENERGAN) IVPB, sodium chloride 0.9%, sodium chloride 0.9%

## 2018-11-27 NOTE — PT/OT/SLP PROGRESS
Physical Therapy Treatment    Patient Name:  Facundo Lord Jr.   MRN:  [id number]    Recommendations:     Discharge Recommendations:  home with home health, home health PT, home health OT   Discharge Equipment Recommendations: 3-in-1 commode, hip kit, shower chair, walker, rolling(all bariatric DME and extra long hip kit)   Barriers to discharge: Decreased caregiver support    Assessment:     Facundo Lord Jr. is a 63 y.o. male admitted with a medical diagnosis of Primary osteoarthritis of right hip.  He presents with the following impairments/functional limitations:  weakness, impaired endurance, impaired functional mobilty, impaired self care skills, gait instability, impaired balance, pain, decreased safety awareness, decreased lower extremity function, decreased ROM, orthopedic precautions, impaired skin .  Continues to requiring cueing to observe hip precautions    Rehab Prognosis:  Good ; patient would benefit from acute skilled PT services to address these deficits and reach maximum level of function.      Recent Surgery: Procedure(s) (LRB):  ARTHROPLASTY, HIP (Right)  RELEASE, TENDON, FLEXOR, HIP, OR HIP ADDUCTOR TENOTOMY (Right) Day of Surgery    Plan:     During this hospitalization, patient to be seen BID to address the above listed problems via gait training, therapeutic activities, therapeutic exercises  · Plan of Care Expires:  12/26/18   Plan of Care Reviewed with: patient, spouse, son    Subjective     Communicated with nursing  prior to session.  Patient found up in chair upon PT entry to room, agreeable to treatment.      Chief Complaint: L buttocks pain  Patient comments/goals: Im ready to get back to bed   Pain/Comfort:  · Pain Rating 1: 2/10  · Location - Side 1: Left  · Location - Orientation 1: generalized  · Location 1: hip  · Pain Addressed 1: Pre-medicate for activity, Reposition, Distraction, Cessation of Activity, Nurse notified  · Pain Rating Post-Intervention 1: 1/10  · Pain  Rating 2: 6/10  · Location - Side 2: Left  · Location - Orientation 2: generalized  · Location 2: (BUTTOCKS)  · Pain Addressed 2: Pre-medicate for activity, Reposition, Distraction, Cessation of Activity, Nurse notified  · Pain Rating Post-Intervention 2: 4/10    Patients cultural, spiritual, Buddhist conflicts given the current situation: none stated     Objective:     Patient found with: peripheral IV, braswell catheter, hemovac, hip abduction pillow, SCD     General Precautions: Standard, fall   Orthopedic Precautions:LLE weight bearing as tolerated, LLE posterior precautions   Braces: N/A     Functional Mobility:  · Bed Mobility:     · Sit to Supine: minimum assistance and HOB flat and use of side rail  · Transfers:     · Sit to Stand:  moderate assistance and of 2 persons with rolling walker from bs chair with pt on 4 pillows  · Gait: pt amb 140' with RW and CGA.  decreased carlitos, step and stride length.  Cueing for precautions amor on turns.   · Balance: fair standing      AM-PAC 6 CLICK MOBILITY  Turning over in bed (including adjusting bedclothes, sheets and blankets)?: 3  Sitting down on and standing up from a chair with arms (e.g., wheelchair, bedside commode, etc.): 2  Moving from lying on back to sitting on the side of the bed?: 2  Moving to and from a bed to a chair (including a wheelchair)?: 2  Need to walk in hospital room?: 3  Climbing 3-5 steps with a railing?: 2  Basic Mobility Total Score: 14       Therapeutic Activities and Exercises:   reviewed precautions, gait as above.  Repositioned up in bed .  Performed Ankle pumps x 10    Patient left HOB elevated with all lines intact, call button in reach, bed alarm on, nurse notified and son and spouse present..    GOALS:   Multidisciplinary Problems     Physical Therapy Goals        Problem: Physical Therapy Goal    Goal Priority Disciplines Outcome Goal Variances Interventions   Physical Therapy Goal     PT, PT/OT Ongoing (interventions implemented as  appropriate)     Description:  Goals to be met by: 12/10/18      Patient will increase functional independence with mobility by performin. Supine to sit with Contact Guard Assistance  2. Sit to supine with Contact Guard Assistance  3. Sit to stand transfer with Stand-by Assistance  4. Gait  x 150 feet with Stand-by Assistance using HDRW  5.  Pt able to name 3/3 hip precautions.                       Time Tracking:     PT Received On: 18  PT Start Time:      PT Stop Time:   PT Total Time (min): 35 min     Billable Minutes: Gait Training 14 and Therapeutic Activity 21    Treatment Type: Treatment  PT/PTA: PT     PTA Visit Number: 0     Nay Cramer, PT  2018

## 2018-11-27 NOTE — PROGRESS NOTES
Pt transferred from PACU via bed, AAOX4 and VSS on RA. Pt complained of 7/10 pain. Dressing to R hip CDI. BLE pulses intact. [place] to gravity draining clear yellow urine.Abduction pillow in place.IV fluids infusing per MD order.Hemovac in place; 230cc of bloody drainage noted. Cardiac monitor applied. Dressing to L buttock noted.PT this shift (see note).Room orientation given. Meal tray ordered.  Purposeful hourly rounding.Pt has call light in reach, side rails up X2, bed in low position, TEDs/SCDs and nonskid socks on. Pt lying in bed in no distress with family at the bedside. Will continue to monitor.

## 2018-11-27 NOTE — DISCHARGE INSTRUCTIONS
Hip Replacement Discharge Instructions    1. Pain:  a. After surgery you may feel some pain in the operative leg groin area. This is normal. Your hip will likely have been injected with a numbing medicine (Exparel) prior to completion of surgery for pain control. This is indicated on a green bracelet that you will wear for 4 days after surgery. You will also get a prescription for pain control before you leave the hospital.  b. Elevate your leg when sitting for comfort  2. Incision Care:  a. Some drainage from the incision in the first 72 hours is normal. If drainage is excessive, remove bandage,  pat dry, cover with sterile gauze, and secure with tape. Notify M.D. for excessive drainage.  b. Staples will be removed 14 days after surgery.  3. Activity:  a. See attached hip precautions and follow for 6 weeks (instructions found at the end of packet):  b. Perform exercises 3 times a day.  c. Use a hard, flat surface, such as a firm mattress, when exercising.  d. You may shower 2 days after surgery providing the dressing is waterproof.. Support/help is mandatory during showering. If dressing becomes wet, replace with a new dressing. No bathing or swimming for 6 weeks or until incision is completely healed.  e. Wear leonidas hose for 3 weeks after surgery. You may remove for 1-2 hours during the day only.Send patient home with an extra pair leonidas hose.  4. Safety:  a. Add cushions to low chairs and car seats for elevation.  b. When getting in and out of a car, it is important to keep your leg straight and out to the side. Wear a seatbelt at all times.  c. You will be given a raised toilet seat (3-1 commode).  d. Use a walker, cane, or crutches as long as M.D. recommends.  5. Possible Complications: Report to Surgeon  a. Infection  i. Unexpected redness  ii. Persistent drainage  iii. Temperature ,can be treated with tylenol. Do not go to the emergency room or urgent care for a temperature, call your surgeon.    iii. Additional swelling  iv. Pain not controlled with current pain medicine  b. Blood clot  i. Unusual pain  ii. Red or discolored skin  iii. Swelling  iv. Unusual warm skin  c. Dislocation  i. Severe hip pain especially when moved  ii. The injured leg is shorter than the uninjured leg  iii. The injured leg lies in an abnormal position. In most cases the leg is bent at the hip, turned inward and pulled toward the middle of the body.

## 2018-11-27 NOTE — PLAN OF CARE
Problem: Physical Therapy Goal  Goal: Physical Therapy Goal  Goals to be met by: 12/10/18      Patient will increase functional independence with mobility by performin. Supine to sit with Contact Guard Assistance  2. Sit to supine with Contact Guard Assistance  3. Sit to stand transfer with Stand-by Assistance  4. Gait  x 150 feet with Stand-by Assistance using HDRW - Goal Achieved 18  5.  Pt able to name 3/3 hip precautions -- Goal Achieved 18      Outcome: Ongoing (interventions implemented as appropriate)  Patient progressing well towards PT goals but continues to be highly impulsive throughout session requiring Max verbal cues to maintain surgical precautions. Patient requires Min A for sit to stand and supine <> sit. Tolerated 150' of gait training with RW, SBA but with notable verbal cues required while turning. SBA required for static balance. Patient would benefit from HH upon discharge in order to optimize functional mobility outcomes and maximize patient safety.

## 2018-11-27 NOTE — PLAN OF CARE
Problem: Occupational Therapy Goal  Goal: Occupational Therapy Goal  No goals  Outcome: Outcome(s) achieved Date Met: 11/27/18  OT evaluation completed with treatment provided.  Recommend Home Health PT/OT and resumption of Home Health would care at discharge.  DME: Hip kit and bariatric 3-in-1 commbrannon and HARSHIL,.  YONY Oviedo 11/27/2018

## 2018-11-27 NOTE — PT/OT/SLP DISCHARGE
Physical Therapy Discharge Summary    Name: Facundo Lord Jr.  MRN: [id number]   Principal Problem: Primary osteoarthritis of right hip     Patient Discharged from acute Physical Therapy on 18.  Please refer to prior PT noted date on 18 for functional status.     Assessment:     Goals partially met.    Objective:     GOALS:   Multidisciplinary Problems     Physical Therapy Goals     Not on file          Multidisciplinary Problems (Resolved)        Problem: Physical Therapy Goal    Goal Priority Disciplines Outcome Goal Variances Interventions   Physical Therapy Goal   (Resolved)     PT, PT/OT Outcome(s) achieved     Description:  Goals to be met by: 12/10/18      Patient will increase functional independence with mobility by performin. Supine to sit with Contact Guard Assistance  2. Sit to supine with Contact Guard Assistance  3. Sit to stand transfer with Stand-by Assistance  4. Gait  x 150 feet with Stand-by Assistance using HDRW - Goal Achieved 18  5.  Pt able to name 3/3 hip precautions -- Goal Achieved 18                        Reasons for Discontinuation of Therapy Services  Transfer to alternate level of care.      Plan:     Patient Discharged to: Home with Home Health Service.    Jody Jara, PT  2018

## 2018-11-27 NOTE — CONSULTS
Consulted for wound to buttock present on admission.  Patient     Was seen by Dr. Erazo in May of 2018 for blister on buttock.  Patient has been followed by home health three times a week for wound packing.  Two wounds noted yesterday to buttock, one near anus. Wounds were packed with antimicrobial packing strips by Bea Kahn RN.  Patient declines wound assessment at this time; reports that he has home health and a physician is following the progress/healing of the wounds.  Patient not assessed by wound care.

## 2018-11-27 NOTE — PLAN OF CARE
SW met with pt at bedside to complete discharge assessment, verified PCP and uses [facility] on [address]  Pt has a straight cane, needs RW, BSC and agreed to be billed long hip kit $42 and doesn't want SC.  Pt has [facility] and would like to resume with them upon discharge.  Wife will provide transportation home.       11/27/18 1375   Discharge Assessment   Assessment Type Discharge Planning Assessment   Confirmed/corrected address and phone number on facesheet? Yes   Assessment information obtained from? Patient   Communicated expected length of stay with patient/caregiver no   Prior to hospitilization cognitive status: Alert/Oriented   Prior to hospitalization functional status: Needs Assistance   Current cognitive status: Alert/Oriented   Current Functional Status: Assistive Equipment;Needs Assistance   Lives With spouse   Able to Return to Prior Arrangements yes   Is patient able to care for self after discharge? No   Readmission Within The Last 30 Days no previous admission in last 30 days   Patient currently being followed by outpatient case management? No   Patient currently receives any other outside agency services? Yes  ([facility])   Equipment Currently Used at Home cane, straight   Do you have any problems affording any of your prescribed medications? No   Is the patient taking medications as prescribed? yes   Does the patient have transportation home? Yes   Discharge Plan A Home Health   Patient/Family In Agreement With Plan yes

## 2018-11-27 NOTE — PLAN OF CARE
Problem: Patient Care Overview  Goal: Plan of Care Review  Outcome: Ongoing (interventions implemented as appropriate)  Pt on RA. No distress noted. IS done. Will continue to monitor.

## 2018-11-27 NOTE — NURSING
Dsg. B/t buttocks removed with small amt serosang drainage noted. 2 small openings noted b/t buttocks.  Opening superior lateral to anus 0.2x0.2x1.6cm and opening lateral anus .01x.01/.03cm. Both wounds packed with gauze,covered with aquacel ag foam.

## 2018-11-27 NOTE — PROGRESS NOTES
IM  Progress Note    Admit Date: 11/26/2018   LOS: 1 day     SUBJECTIVE:     Follow-up For:  Primary osteoarthritis of right hip     Interval History:     POD #1 right hip repair.  In bed for eval. Denies CP,SOB,N,V or calf tenderness. Voiding with ease. Will d/c today.    Review of Systems:  Constitutional: No fever or chills  Respiratory: No cough or shortness of breath  Cardiovascular: No chest pain or palpitations  Gastrointestinal: No nausea or vomiting  Neurological: No confusion or weakness    OBJECTIVE:     Vital Signs Range (Last 24H):  BP (!) 101/52 (Patient Position: Lying)   Pulse 82   Temp 97.9 °F (36.6 °C) (Oral)   Resp 18   Ht 6' (1.829 m)   Wt (!) 175.2 kg (386 lb 3.9 oz)   SpO2 95%   BMI 52.38 kg/m²     Temp:  [97.5 °F (36.4 °C)-98.1 °F (36.7 °C)]   Pulse:  [59-87]   Resp:  [16-20]   BP: (101-149)/(52-76)   SpO2:  [93 %-98 %]     I & O (Last 24H):    Intake/Output Summary (Last 24 hours) at 11/27/2018 0837  Last data filed at 11/27/2018 0800  Gross per 24 hour   Intake 1480 ml   Output 1730 ml   Net -250 ml       Physical Exam:  General appearance: Well developed, well nourished  Eyes:  Conjunctivae/corneas clear. PERRL.  Lungs: Normal respiratory effort,   clear to auscultation bilaterally   Heart: Regular rate and rhythm, S1, S2 normal, no murmur, rub or khari.  Abdomen: Soft, non-tender non-distended, rounded; bowel sounds normal; no masses,  no organomegaly  Extremities: No cyanosis or clubbing. No edema.  +2 pulses BLE, abductor pillow in place  Skin: Skin color, texture, turgor normal. No rashes or lesions. Dressing right hip CDI, dressing left buttock intact  Neurologic: Normal strength and tone. No focal numbness or weakness     Laboratory Data:  Recent Labs   Lab 11/27/18  0600   WBC 9.09   RBC 4.60   HGB 12.8*   HCT 39.3*      MCV 85   MCH 27.8   MCHC 32.6       BMP: No results for input(s): GLU, NA, K, CL, CO2, BUN, CREATININE, CALCIUM, MG in the last 168 hours.    Invalid  input(s):  PHOS  Lab Results   Component Value Date    CALCIUM 10.0 11/15/2018               Medications:  Medication list was reviewed and changes noted under Assessment/Plan.    Diagnostic Results:    Reviewed      ASSESSMENT/PLAN:     1. Right hip repair (M16.11): POD #1, per therapy and ortho teams  2. HTN (I10): meds with hold parameters  3. COPD/Emphysema (J44.9/J43.9): as now  4. ABLA (D62): noted on AM labs. Asymptomatic  5. CAD (I25.10): Followed Dr. Chacon. Currently asymptomatic.  6. CHF (I50.9): Followed by Dr. Chacon. Appears euvolemic.  7. RA (M06.9): defer, hold plaquenil  8. A-fib (I48.91): per Dr. Chacon, monitor on tele. Does have AICD. Plavix held pre-operatively  9. Anal fistula (K60.3): per Dr. Islas.  Appreciate wound care's input.  10. DVT prophy:  mg BID, TEDs and SCD     Plan: Medically stable for d/c today. Encourage prompt follow up with colorectal surgery.

## 2018-11-27 NOTE — PLAN OF CARE
Wound documentation completed yesterday by Bea Kahn RN forwarded to [facility] via [facility]

## 2018-11-27 NOTE — PT/OT/SLP EVAL
Occupational Therapy   Evaluation/Treatment/Discharge    Name: Facundo Lord Jr.  MRN: [id number]  Admitting Diagnosis:  Primary osteoarthritis of right hip 1 Day Post-Op    Recommendations:     Discharge Recommendations: home health PT, home health OT  Discharge Equipment Recommendations:  3-in-1 commode, walker, rolling, hip kit(Bariatric DME needed )  Barriers to discharge:  None    History:     Occupational Profile:  Living Environment: lives with his wife and disabled son in a single story house with a ramp at entry  Previous level of function: ambulatory with SC, drives a care-wife assists, MI ADLs and IADLs,   Roles and Routines: none  Equipment Used at Home:  cane, straight  Assistance upon Discharge: pt has the assist of his wife at discharge    Past Medical History:   Diagnosis Date    AICD (automatic cardioverter/defibrillator) present     Anticoagulant long-term use     Cancer     CHF (congestive heart failure)     COPD (chronic obstructive pulmonary disease)     Coronary artery disease     Emphysema of lung     Hypertension     Rheumatoid arthritis        Past Surgical History:   Procedure Laterality Date    anal fistula excision  08/2018    ARTHROPLASTY, HIP Right 11/26/2018    [facility]    bladder cancer surgeruy Bilateral     CARDIAC DEFIBRILLATOR PLACEMENT      excision mass buttocks  05/04/2018    EXCISION-MASS-BUTTOCKS Left 5/4/2018    [facility] by [facility]it [facility]o [facility] at [facility]    HIP ARTHROPLASTY Right 11/26/2018    Procedure: ARTHROPLASTY, HIP;  Surgeon: Jorge Luis Alejandra MD;  [facility]: [facility];  Service: Orthopedics;  Laterality: Right;  OFIRMEV    pace maker      defibrillator    RELEASE OF TENDON OF HIP Right 11/26/2018    Procedure: RELEASE, TENDON, FLEXOR, HIP, OR HIP ADDUCTOR TENOTOMY;  Surgeon: Jorge Luis Alejandra MD;  Location: [facility];  Service: Orthopedics;  Laterality: Right;    RELEASE, TENDON, FLEXOR, HIP, OR HIP ADDUCTOR TENOTOMY  Right 11/26/2018    Per[facility] at [facility] OR    [facility]         Subjective     Chief Complaint: none  Patient/Family Comments/goals: regain pain-free mobility    Pain/Comfort:  · Pain Rating 1: 7/10  · Location - Side 1: Left  · Location - Orientation 1: generalized  · Location 1: (buttock)  · Pain Addressed 1: Pre-medicate for activity, Reposition, Cessation of Activity, Nurse notified  · Pain Rating Post-Intervention 1: (11/10)  · Pain Rating 2: 4/10  · Location - Side 2: Left  · Location - Orientation 2: generalized  · Location 2: hip  · Pain Addressed 2: Pre-medicate for activity, Cessation of Activity  · Pain Rating Post-Intervention 2: 2/10    Patients cultural, spiritual, Rastafarian conflicts given the current situation: none    Objective:     Communicated with: patient and his nurse prior to session.  Patient found with: all lines intact, call button in reach and nurse notified and peripheral IV, telemetry, SCD upon OT entry to room.  Pt was found sitting in the chair uncomfortable (his buttocks hurt), agreeable to OT service.    General Precautions: Standard, fall   Orthopedic Precautions:LLE weight bearing as tolerated, LLE posterior precautions   Braces: N/A     Occupational Performance:    Bed Mobility:    · None    Functional Mobility/Transfers:        SBA sit><stand with RW        Supervision bed>chair transfer with RW    He ambulated chair>bathroom>sofa to retrieve clothing>bed to get dressed with RW SBA    Activities of Daily Living:        SBA G/H (brush teeth, wash hands) standing at sink with RW        Independent UBD ([facility] hospital gown, don shirt) standing EOB with RW        Min A LBD (don briefs, slacks, doff socks, don shoes) with AE EOB with RW used for standing - instruction        Min A retrieve clothing for use with RW at sofa - instructions provided        SBA toilet hygiene (urination standing) with RW-instruction for RW use and safety    Cognitive/Visual  Perceptual:  Cognitive/Psychosocial Skills:     -       Oriented to: Person, Place, Time and Situation   -       Follows Commands/attention:Follows two-step commands  -       Communication: clear/fluent  -       Memory: No Deficits noted  -       Safety awareness/insight to disability: impaired   -       Mood/Affect/Coping skills/emotional control: Appropriate to situation  Visual/Perceptual:      -Intact reading glasses    Physical Exam:  Postural examination/scapula alignment:    -       Scoliosis  -       leans to left  Skin integrity: Wound buttocks  Edema:  Severe Right thigh  Sensation:    -       Intact for light touch both hands  Motor Planning:    -       WFL  Dominant hand:    -       Left  UE ROM: WNL BUE  UE Strength: WNL BUE  Hand Function: WNL both hands  Gross motor coordination:   sitting balance fair (leans to Left to reduce buttocks pain when sitting), impaired LE balance-gait    AM PAC 6 Click ADL:  AM PAC Total Score: 20    Treatment & Education:  Urination in standing with RW, home set-up for self-care, hip precautions with transfers and self-care tasks, chair choice requirements, activity restrictions and recommendations for home,  LBD with AE, carrying and retrieving objects with RW  Education:    Patient left up in chair with all lines intact, call button in reach and nurse and PT notified    Assessment:     Facundo Lodr Jr. is a 63 y.o. male with a medical diagnosis of Primary osteoarthritis of right hip.  He presents with the following performance deficits affecting function: impaired endurance, pain, gait instability, impaired functional mobilty, decreased safety awareness, impaired self care skills, impaired balance, decreased lower extremity function, orthopedic precautions, edema.  OT treatment was provided to instruction in RW use and hip precautions for functional mobility and self-care tasks.  No further OT treatment planned; hospital discharge today.    Rehab Prognosis: Good;  "patient would benefit from acute skilled OT services to address these deficits and reach maximum level of function.         Clinical Decision Makin.  OT Mod:  "Pt evaluation falls under moderate complexity for evaluation coding due to identification of 3-5 performance deficits noted as stated above. Eval required Min/Mod assistance to complete on this date and detailed assessment(s) were utilized. Moreover, an expanded review of history and occupational profile obtained with additional review of cognitive, physical and psychosocial hx."  Extra attention and problem solving needed to address buttocks wound effects on mobility and self-care, with modifications in instructions and task presentation needed to complete the evaluation.    Plan:     Patient to be seen   to address the above listed problems via self-care/home management, therapeutic activities, therapeutic exercises  · Plan of Care Expires:    · Plan of Care Reviewed with: patient    This Plan of care has been discussed with the patient who was involved in its development and understands and is in agreement with the identified goals and treatment plan    GOALS:   Multidisciplinary Problems     Occupational Therapy Goals     Not on file          Multidisciplinary Problems (Resolved)        Problem: Occupational Therapy Goal    Goal Priority Disciplines Outcome Interventions   Occupational Therapy Goal   (Resolved)     OT, PT/OT Outcome(s) achieved    Description:  No goals                    Time Tracking:     OT Date of Treatment: 18  OT Start Time: 1142  OT Stop Time: 1257  OT Total Time (min): 75 min    Billable Minutes:Evaluation 32  Self Care/Home Management 43    YONY Oviedo  2018    "

## 2018-11-27 NOTE — PLAN OF CARE
Problem: Patient Care Overview  Goal: Plan of Care Review  Outcome: Ongoing (interventions implemented as appropriate)  Pt remains free from injury or falls. Vital signs stable throughout night on room air. Pain managed with PO medications and scheduled IV Tylenol, no complaints of nausea. Telemetry maintained. [place] draining to gravity, dressing to right hip dry and intact with abduction pillow in use, hemovac drain in place to right hip. Aquacel dressing  in place to buttock.  Bed in low locked position and call light within reach.  Will continue to monitor.

## 2018-11-27 NOTE — PT/OT/SLP PROGRESS
Physical Therapy Treatment    Patient Name:  Facundo Lord Jr.   MRN:  [id number]    Recommendations:     Discharge Recommendations:  home health OT, home health PT   Discharge Equipment Recommendations: 3-in-1 commode, walker, rolling, hip kit(Bariatric equipment)   Barriers to discharge: None    Assessment:     Facundo Lord Jr. is a 63 y.o. male admitted with a medical diagnosis of Primary osteoarthritis of right hip.  He presents with the following impairments/functional limitations:  impaired functional mobilty, decreased safety awareness, pain, gait instability, impaired endurance, orthopedic precautions, impaired self care skills, decreased lower extremity function. These impairments inhibit the patient from independently and safely participating in desired functional tasks such as transfers, ambulating within home, taking care of disabled child, and community ambulation.     Patient progressing well towards PT goals but continues to be highly impulsive throughout session requiring Max verbal cues to maintain surgical precautions. Patient requires Min A for sit to stand and supine <> sit. Tolerated 150' of gait training with RW, SBA but with notable verbal cues required while turning. SBA required for static balance. Patient would benefit from HH upon discharge in order to optimize functional mobility outcomes and maximize patient safety.     Rehab Prognosis:  Fair; patient would benefit from acute skilled PT services to address these deficits and reach maximum level of function.      Recent Surgery: Procedure(s) (LRB):  ARTHROPLASTY, HIP (Right)  RELEASE, TENDON, FLEXOR, HIP, OR HIP ADDUCTOR TENOTOMY (Right) 1 Day Post-Op    Plan:     During this hospitalization, patient to be seen BID to address the above listed problems via gait training, therapeutic activities, therapeutic exercises  · Plan of Care Expires:  12/26/18   Plan of Care Reviewed with: patient    Subjective     Communicated with RN prior  "to session.  Patient found supine in bed entry to room, agreeable to treatment.      Chief Complaint: Pain on buttocks   Patient comments/goals: when supine, "I feel dizzy" Patient inquired about techniques for hygiene care, specifically after a BM. Patient also very inquisitive about precautions. "It's the hole in my butt that hurts, not my hip." "The reason why I'm walking like this is because this RW is not sturdy. I feel like I'm going to fall."     Pain/Comfort:  · Pain Rating 1: 5/10  · Location - Side 1: Left  · Location - Orientation 1: generalized  · Location 1: (buttock)  · Pain Addressed 1: Distraction  · Pain Rating Post-Intervention 1: 10/10    Patients cultural, spiritual, Evangelical conflicts given the current situation: none stated     Objective:     Patient found with: telemetry, peripheral IV, SCD     General Precautions: Standard, fall   Orthopedic Precautions:LLE weight bearing as tolerated, LLE posterior precautions   Braces: N/A     Vitals:  · Supine:   · 133/63  · HR 77  · Sitting  · 154/81  ·     Functional Mobility:  · Bed Mobility:     · Supine to Sit: minimum assistance  · Verbal cues to prevent IR of LLE while transitioning  · Explained how to transition properly and safely without crossing legs  · Transfers:     · Sit to Stand:  minimum assistance with rolling walker,  · Demonstration to prevent >90 of hip flexion during transfer  · Gait:   · Ambulated 15' to bathroom; SBA as patient used urinal  · Ambulated 175' with CGA and RW  · Verbal cues to improve L knee flexion during L swing phase  · Verbal cues for turning to  feet and prevent internal rotation/pivoting  · Initial step to pattern noted. PT demonstrated for patient how to step-though.   · Patient hesitant to step-through 2/2 patient believing the RW is "not sturdy enough"  · Balance:   · Sitting EOB: SBA  · Verbal cues to straighten L knee to prevent hip flexion > 90      AM-PAC 6 CLICK MOBILITY  Turning over in bed " (including adjusting bedclothes, sheets and blankets)?: 3  Sitting down on and standing up from a chair with arms (e.g., wheelchair, bedside commode, etc.): 3  Moving from lying on back to sitting on the side of the bed?: 3  Moving to and from a bed to a chair (including a wheelchair)?: 3  Need to walk in hospital room?: 3  Climbing 3-5 steps with a railing?: 3  Basic Mobility Total Score: 18       Therapeutic Activities and Exercises:  · Spent 10-15 minutes discussing do's and don'ts regarding hip precautions in conjunction with mobility tasks  · Patient tolerated the following therapeutic exercises with max verbal cues: 20 ankle pumps BLE, 10 quad sets LLE, 10 gluteal squeezes. PT explained the importance of each therapeutic exercise  ·     Patient left up in chair with all lines intact, call button in reach and RN notified..    GOALS:   Multidisciplinary Problems     Physical Therapy Goals        Problem: Physical Therapy Goal    Goal Priority Disciplines Outcome Goal Variances Interventions   Physical Therapy Goal     PT, PT/OT Ongoing (interventions implemented as appropriate)     Description:  Goals to be met by: 12/10/18      Patient will increase functional independence with mobility by performin. Supine to sit with Contact Guard Assistance  2. Sit to supine with Contact Guard Assistance  3. Sit to stand transfer with Stand-by Assistance  4. Gait  x 150 feet with Stand-by Assistance using HDRW - Goal Achieved 18  5.  Pt able to name 3/3 hip precautions -- Goal Achieved 18                        Time Tracking:     PT Received On: 18  PT Start Time: 1035     PT Stop Time: 1140  PT Total Time (min): 65 min     Billable Minutes: Gait Training 22, Therapeutic Activity 33 and Therapeutic Exercise 10    Treatment Type: Treatment  PT/PTA: PT     PTA Visit Number: 0     Viviana Long, PT  2018

## 2018-11-27 NOTE — PLAN OF CARE
Problem: Patient Care Overview  Goal: Plan of Care Review  Outcome: Ongoing (interventions implemented as appropriate)  Pt in no distress on RA, IS done. Will continue to monitor.

## 2018-11-27 NOTE — NURSING
Discharge instructions reviewed and patient verbalized understanding. Follow up appointments discussed with patient. New medication and side effects discussed with patient. IV catheter removed with catheter intact without any signs of swelling, redness or pain. Patient remained free from falls, injury, and skin break down this hospilization. Patient in room waiting for transport.

## 2018-11-29 ENCOUNTER — TELEPHONE (OUTPATIENT)
Dept: MEDSURG UNIT | Facility: OTHER | Age: 63
End: 2018-11-29

## 2018-11-30 NOTE — DISCHARGE SUMMARY
[facility]  Short Stay  Discharge Summary  TOTAL HIP REPLACEMENT    Admit Date: 11/26/2018    Discharge Date and Time: 11/27/2018  4:07 PM      Discharge Attending Physician: Jorge Luis Alejandra MD     Hospital Course:  Facundo Lord Jr.,is a 63 y.o. male with severe osteoarthritis,   R hip, unrelieved with the conservative measures. The patient was admitted on 11/26/2018 underwent R total hip replacement.  Postoperatively, the patient did well and was weightbearing as tolerated with a walker. Facundo Lord Jr. was instructed on hip precautions.  The patient was discharged on 11/27/2018 with home health physical therapy and nursing. The patient will be on Percocet for pain and aspirin 325 mg p.o. b.i.d. with meals x4 weeks. I will see them back in the office in 4 weeks for followup.      Final Diagnoses:    Principal Problem: Primary osteoarthritis of right hip   Secondary Diagnoses:   Active Hospital Problems    Diagnosis  POA    Primary osteoarthritis of right hip [M16.11]  Yes      Resolved Hospital Problems    Diagnosis Date Resolved POA    *Primary osteoarthritis of right hip [M16.11] 11/26/2018 Yes       Discharged Condition: good    Disposition: Home-Health Care [facility]    Follow up/Patient Instructions:    Medications:  Reconciled Home Medications:      Medication List      START taking these medications    aspirin 325 MG tablet  Take 1 tablet (325 mg total) by mouth every 12 (twelve) hours. Take twice a day for 3 days then discontinue and resume Aspirin 81 mg, plavix, and trendal for 6 days  Replaces:  aspirin 81 MG EC tablet     oxyCODONE-acetaminophen 5-325 mg per tablet  Commonly known as:  PERCOCET  Take 1 tablet by mouth every 4 hours as needed for pain or 2 tablets by mouth every 6 hours as needed for pain        CHANGE how you take these medications    buPROPion 150 MG TBSR 12 hr tablet  Commonly known as:  WELLBUTRIN SR  Take 1 tablet (150 mg total) by mouth 2 (two) times daily.  What  changed:  when to take this        CONTINUE taking these medications    atorvastatin 40 MG tablet  Commonly known as:  LIPITOR  Take 40 mg by mouth once daily.     folic acid 1 MG tablet  Commonly known as:  FOLVITE  Take 1 mg by mouth once daily.     hydroxychloroquine 200 mg tablet  Commonly known as:  PLAQUENIL  Take 200 mg by mouth 2 (two) times daily.     losartan 50 MG tablet  Commonly known as:  COZAAR  Take 50 mg by mouth once daily.     metoprolol succinate 50 MG 24 hr tablet  Commonly known as:  TOPROL-XL  Take 50 mg by mouth 2 (two) times daily.     nicotine 21 mg/24 hr  Commonly known as:  NICODERM CQ  Place 1 patch onto the skin once daily.     potassium chloride 10 MEQ Cpsr  Commonly known as:  MICRO-K  Take 10 mEq by mouth once.        STOP taking these medications    aspirin 81 MG EC tablet  Commonly known as:  ECOTRIN  Replaced by:  aspirin 325 MG tablet     clopidogrel 300 mg Tab  Commonly known as:  PLAVIX     HYDROcodone-acetaminophen  mg per tablet  Commonly known as:  NORCO     pentoxifylline 400 mg Tbsr  Commonly known as:  TRENTAL          Discharge Procedure Orders   COMMODE FOR HOME USE     Order Specific Question Answer Comments   Type: Heavy duty    Height: 6' (1.829 m)    Weight: 175.2 kg (386 lb 3.9 oz)    Does patient have medical equipment at home? cane, straight    Length of need (1-99 months): 99    Vendor: [facility]    Expected Date of Delivery: 11/27/2018      HIP KIT FOR HOME USE     Order Specific Question Answer Comments   Height: 6' (1.829 m)    Weight: 175.2 kg (386 lb 3.9 oz)    Does patient have medical equipment at home? cane, straight    Length of need (1-99 months): 99    Type: [place] Hip Kit    Vendor: [facility]    Expected Date of Delivery: 11/27/2018      WALKER FOR HOME USE     Order Specific Question Answer Comments   Type of Walker: Heavy duty    With wheels? Yes    Height: 6' (1.829 m)    Weight: 175.2 kg (386 lb 3.9 oz)    Length of need (1-99  months): 99    Does patient have medical equipment at home? cane, straight    Please check all that apply: Patient's condition impairs ambulation.    Please check all that apply: Patient is unable to safely ambulate without equipment.    Please check all that apply: Patient needs help to get in and out of chair.    Please check all that apply: Walker will be used for gait training.    Vendor: [facility]    Expected Date of Delivery: 11/27/2018      Follow-up Information     Jorge Luis Alejandra MD In 4 weeks.    Specialty:  Orthopedic Surgery  Contact information:  [address]  [facility]  [facility] [address]  [phone]             [facility].    Specialty:  Home Health Services  Why:  Home Health  Contact information:  [address] [facility] [address]  [phone]             [facility]    Specialty:  DME Provider  Why:  Heavy Duty rolling walker, Heavy Duty bedside commode, & long hip kit   Contact information:  [address]  [facility] [address]  [phone]

## 2019-02-03 ENCOUNTER — OFFICE VISIT (OUTPATIENT)
Dept: URGENT CARE | Facility: CLINIC | Age: 64
End: 2019-02-03
Payer: MEDICARE

## 2019-02-03 VITALS
WEIGHT: 315 LBS | TEMPERATURE: 98 F | HEIGHT: 72 IN | HEART RATE: 80 BPM | SYSTOLIC BLOOD PRESSURE: 138 MMHG | RESPIRATION RATE: 18 BRPM | OXYGEN SATURATION: 95 % | DIASTOLIC BLOOD PRESSURE: 80 MMHG | BODY MASS INDEX: 42.66 KG/M2

## 2019-02-03 DIAGNOSIS — F17.200 SMOKER: ICD-10-CM

## 2019-02-03 DIAGNOSIS — J20.9 ACUTE BRONCHITIS, UNSPECIFIED ORGANISM: Primary | ICD-10-CM

## 2019-02-03 DIAGNOSIS — Z87.09 HISTORY OF COPD: ICD-10-CM

## 2019-02-03 DIAGNOSIS — R07.89 CHEST TIGHTNESS: ICD-10-CM

## 2019-02-03 PROCEDURE — 94640 PR INHAL RX, AIRWAY OBST/DX SPUTUM INDUCT: ICD-10-PCS | Mod: 59,S$GLB,, | Performed by: NURSE PRACTITIONER

## 2019-02-03 PROCEDURE — 3008F PR BODY MASS INDEX (BMI) DOCUMENTED: ICD-10-PCS | Mod: CPTII,S$GLB,, | Performed by: NURSE PRACTITIONER

## 2019-02-03 PROCEDURE — 96372 PR INJECTION,THERAP/PROPH/DIAG2ST, IM OR SUBCUT: ICD-10-PCS | Mod: 59,S$GLB,, | Performed by: NURSE PRACTITIONER

## 2019-02-03 PROCEDURE — 94640 AIRWAY INHALATION TREATMENT: CPT | Mod: 59,S$GLB,, | Performed by: NURSE PRACTITIONER

## 2019-02-03 PROCEDURE — 99214 OFFICE O/P EST MOD 30 MIN: CPT | Mod: 25,S$GLB,, | Performed by: NURSE PRACTITIONER

## 2019-02-03 PROCEDURE — 99214 PR OFFICE/OUTPT VISIT, EST, LEVL IV, 30-39 MIN: ICD-10-PCS | Mod: 25,S$GLB,, | Performed by: NURSE PRACTITIONER

## 2019-02-03 PROCEDURE — 96372 THER/PROPH/DIAG INJ SC/IM: CPT | Mod: 59,S$GLB,, | Performed by: NURSE PRACTITIONER

## 2019-02-03 PROCEDURE — 3008F BODY MASS INDEX DOCD: CPT | Mod: CPTII,S$GLB,, | Performed by: NURSE PRACTITIONER

## 2019-02-03 RX ORDER — BETAMETHASONE SODIUM PHOSPHATE AND BETAMETHASONE ACETATE 3; 3 MG/ML; MG/ML
9 INJECTION, SUSPENSION INTRA-ARTICULAR; INTRALESIONAL; INTRAMUSCULAR; SOFT TISSUE
Status: COMPLETED | OUTPATIENT
Start: 2019-02-03 | End: 2019-02-03

## 2019-02-03 RX ORDER — ALBUTEROL SULFATE 0.83 MG/ML
2.5 SOLUTION RESPIRATORY (INHALATION)
Status: COMPLETED | OUTPATIENT
Start: 2019-02-03 | End: 2019-02-03

## 2019-02-03 RX ORDER — AMOXICILLIN AND CLAVULANATE POTASSIUM 875; 125 MG/1; MG/1
1 TABLET, FILM COATED ORAL 2 TIMES DAILY
Qty: 14 TABLET | Refills: 0 | Status: SHIPPED | OUTPATIENT
Start: 2019-02-03 | End: 2019-02-10

## 2019-02-03 RX ORDER — IPRATROPIUM BROMIDE 0.5 MG/2.5ML
0.5 SOLUTION RESPIRATORY (INHALATION)
Status: COMPLETED | OUTPATIENT
Start: 2019-02-03 | End: 2019-02-03

## 2019-02-03 RX ORDER — CODEINE PHOSPHATE AND GUAIFENESIN 10; 100 MG/5ML; MG/5ML
5 SOLUTION ORAL NIGHTLY PRN
Qty: 236 ML | Refills: 0 | Status: SHIPPED | OUTPATIENT
Start: 2019-02-03 | End: 2019-02-13

## 2019-02-03 RX ADMIN — BETAMETHASONE SODIUM PHOSPHATE AND BETAMETHASONE ACETATE 9 MG: 3; 3 INJECTION, SUSPENSION INTRA-ARTICULAR; INTRALESIONAL; INTRAMUSCULAR; SOFT TISSUE at 05:02

## 2019-02-03 RX ADMIN — IPRATROPIUM BROMIDE 0.5 MG: 0.5 SOLUTION RESPIRATORY (INHALATION) at 05:02

## 2019-02-03 RX ADMIN — ALBUTEROL SULFATE 2.5 MG: 0.83 SOLUTION RESPIRATORY (INHALATION) at 05:02

## 2019-02-03 NOTE — PATIENT INSTRUCTIONS
If you were prescribed a narcotic medication, do not drive or operate heavy equipment or machinery while taking these medications. Cough syrup may make you drowsy.     Start taking mucinex twice a day, use albuterol neb treatments at home.     Follow up with your PCP if your symptoms worsen or do not improve.     You must understand that you've received an Urgent Care treatment only and that you may be released before all your medical problems are known or treated. You, the patient, will arrange for follow up care as instructed.  If your condition worsens we recommend that you receive another evaluation at the emergency room immediately or contact your primary medical clinics after hours call service to discuss your concerns.  Please return here or go to the Emergency Department for any concerns or worsening of condition.      Bronchitis, Antibiotic Treatment (Adult)    Bronchitis is an infection of the air passages (bronchial tubes) in your lungs. It often occurs when you have a cold. This illness is contagious during the first few days and is spread through the air by coughing and sneezing, or by direct contact (touching the sick person and then touching your own eyes, nose, or mouth).  Symptoms of bronchitis include cough with mucus (phlegm) and low-grade fever. Bronchitis usually lasts 7 to 14 days. Mild cases can be treated with simple home remedies. More severe infection is treated with an antibiotic.  Home care  Follow these guidelines when caring for yourself at home:  · If your symptoms are severe, rest at home for the first 2 to 3 days. When you go back to your usual activities, don't let yourself get too tired.  · Do not smoke. Also avoid being exposed to secondhand smoke.  · You may use over-the-counter medicines to control fever or pain, unless another medicine was prescribed. (Note: If you have chronic liver or kidney disease or have ever had a stomach ulcer or gastrointestinal bleeding, talk with your  healthcare provider before using these medicines. Also talk to your provider if you are taking medicine to prevent blood clots.) Aspirin should never be given to anyone younger than 18 years of age who is ill with a viral infection or fever. It may cause severe liver or brain damage.  · Your appetite may be poor, so a light diet is fine. Avoid dehydration by drinking 6 to 8 glasses of fluids per day (such as water, soft drinks, sports drinks, juices, tea, or soup). Extra fluids will help loosen secretions in the nose and lungs.  · Over-the-counter cough, cold, and sore-throat medicines will not shorten the length of the illness, but they may be helpful to reduce symptoms. (Note: Do not use decongestants if you have high blood pressure.)  · Finish all antibiotic medicine. Do this even if you are feeling better after only a few days.  Follow-up care  Follow up with your healthcare provider, or as advised. If you had an X-ray or ECG (electrocardiogram), a specialist will review it. You will be notified of any new findings that may affect your care.  Note: If you are age 65 or older, or if you have a chronic lung disease or condition that affects your immune system, or you smoke, talk to your healthcare provider about having pneumococcal vaccinations and a yearly influenza vaccination (flu shot).  When to seek medical advice  Call your healthcare provider right away if any of these occur:  · Fever of 100.4°F (38°C) or higher  · Coughing up increased amounts of colored sputum  · Weakness, drowsiness, headache, facial pain, ear pain, or a stiff neck  Call 911, or get immediate medical care  Contact emergency services right away if any of these occur.  · Coughing up blood  · Worsening weakness, drowsiness, headache, or stiff neck  · Trouble breathing, wheezing, or pain with breathing  Date Last Reviewed: 9/13/2015  © 7133-4170 [employer]. [address] [place] [address], [place] [address]. All rights reserved. This  information is not intended as a substitute for professional medical care. Always follow your healthcare professional's instructions.

## 2019-02-03 NOTE — PROGRESS NOTES
Subjective:       Patient ID: Facundo Lord Jr. is a 63 y.o. male.    Vitals:  height is 6' (1.829 m) and weight is 160.1 kg (353 lb) (abnormal). His oral temperature is 98.3 °F (36.8 °C). His blood pressure is 138/80 and his pulse is 80. His respiration is 18 and oxygen saturation is 95%.     Chief Complaint: No chief complaint on file.    Pt c/o persistent cough and rhinorrhea, x 3 days       URI    This is a new problem. The current episode started in the past 7 days. The problem has been gradually worsening. There has been no fever. Associated symptoms include coughing and rhinorrhea. Pertinent negatives include no congestion, ear pain, nausea, rash, sinus pain, sore throat, vomiting or wheezing. He has tried decongestant for the symptoms. The treatment provided mild relief.       Constitution: Negative for chills, sweating, fatigue and fever.   HENT: Negative for ear pain, congestion, sinus pain, sinus pressure, sore throat and voice change.    Neck: Negative for painful lymph nodes.   Eyes: Negative for eye redness.   Respiratory: Positive for chest tightness, cough, sputum production, COPD and shortness of breath. Negative for bloody sputum, stridor, wheezing and asthma.    Gastrointestinal: Negative for nausea and vomiting.   Musculoskeletal: Negative for muscle ache.   Skin: Negative for rash.   Allergic/Immunologic: Negative for seasonal allergies and asthma.   Hematologic/Lymphatic: Negative for swollen lymph nodes.       Objective:      Physical Exam   Constitutional: He is oriented to person, place, and time. He appears well-developed and well-nourished. He is cooperative.  Non-toxic appearance. He does not appear ill. No distress.   obese   HENT:   Head: Normocephalic and atraumatic.   Right Ear: Hearing, tympanic membrane, external ear and ear canal normal.   Left Ear: Hearing, tympanic membrane, external ear and ear canal normal.   Nose: Nose normal. No mucosal edema, rhinorrhea or nasal  deformity. No epistaxis. Right sinus exhibits no maxillary sinus tenderness and no frontal sinus tenderness. Left sinus exhibits no maxillary sinus tenderness and no frontal sinus tenderness.   Mouth/Throat: Uvula is midline, oropharynx is clear and moist and mucous membranes are normal. No trismus in the jaw. Normal dentition. No uvula swelling. No posterior oropharyngeal erythema.   Eyes: Conjunctivae and lids are normal. No scleral icterus.   Sclera clear bilat   Neck: Trachea normal, full passive range of motion without pain and phonation normal. Neck supple.   Cardiovascular: Normal rate, regular rhythm, normal heart sounds, intact distal pulses and normal pulses.   Pulmonary/Chest: Effort normal. No respiratory distress. He has wheezes in the right upper field and the left upper field.   Abdominal: Soft. Normal appearance and bowel sounds are normal. He exhibits no distension. There is no tenderness.   Musculoskeletal: Normal range of motion. He exhibits no edema or deformity.   Neurological: He is alert and oriented to person, place, and time. He exhibits normal muscle tone. Coordination normal.   Skin: Skin is warm, dry and intact. He is not diaphoretic. No pallor.   Psychiatric: He has a normal mood and affect. His speech is normal and behavior is normal. Judgment and thought content normal. Cognition and memory are normal.   Nursing note and vitals reviewed.      Assessment:       1. Acute bronchitis, unspecified organism    2. History of COPD    3. Chest tightness    4. Smoker        Plan:           Lengthy discussion with patient regarding the difference between viral and bacterial infections.  He is persistent regarding being treated with an antibiotic despite education due to going out of town in a couple of days. Educated patient that he should wait to take antibiotic as this is most likely viral. He states he has albuterol nebulizer at home and does not need any scripts.     Acute bronchitis,  unspecified organism  -     amoxicillin-clavulanate 875-125mg (AUGMENTIN) 875-125 mg per tablet; Take 1 tablet by mouth 2 (two) times daily. for 7 days  Dispense: 14 tablet; Refill: 0  -     guaifenesin-codeine 100-10 mg/5 ml (TUSSI-ORGANIDIN NR)  mg/5 mL syrup; Take 5 mLs by mouth nightly as needed.  Dispense: 236 mL; Refill: 0    History of COPD  -     amoxicillin-clavulanate 875-125mg (AUGMENTIN) 875-125 mg per tablet; Take 1 tablet by mouth 2 (two) times daily. for 7 days  Dispense: 14 tablet; Refill: 0    Chest tightness  -     ipratropium 0.02 % nebulizer solution 0.5 mg  -     albuterol nebulizer solution 2.5 mg  -     betamethasone acetate-betamethasone sodium phosphate injection 9 mg    Smoker            Patient Instructions   If you were prescribed a narcotic medication, do not drive or operate heavy equipment or machinery while taking these medications. Cough syrup may make you drowsy.     Start taking mucinex twice a day, use albuterol neb treatments at home.     Follow up with your PCP if your symptoms worsen or do not improve.     You must understand that you've received an Urgent Care treatment only and that you may be released before all your medical problems are known or treated. You, the patient, will arrange for follow up care as instructed.  If your condition worsens we recommend that you receive another evaluation at the emergency room immediately or contact your primary medical clinics after hours call service to discuss your concerns.  Please return here or go to the Emergency Department for any concerns or worsening of condition.      Bronchitis, Antibiotic Treatment (Adult)    Bronchitis is an infection of the air passages (bronchial tubes) in your lungs. It often occurs when you have a cold. This illness is contagious during the first few days and is spread through the air by coughing and sneezing, or by direct contact (touching the sick person and then touching your own eyes, nose,  or mouth).  Symptoms of bronchitis include cough with mucus (phlegm) and low-grade fever. Bronchitis usually lasts 7 to 14 days. Mild cases can be treated with simple home remedies. More severe infection is treated with an antibiotic.  Home care  Follow these guidelines when caring for yourself at home:  · If your symptoms are severe, rest at home for the first 2 to 3 days. When you go back to your usual activities, don't let yourself get too tired.  · Do not smoke. Also avoid being exposed to secondhand smoke.  · You may use over-the-counter medicines to control fever or pain, unless another medicine was prescribed. (Note: If you have chronic liver or kidney disease or have ever had a stomach ulcer or gastrointestinal bleeding, talk with your healthcare provider before using these medicines. Also talk to your provider if you are taking medicine to prevent blood clots.) Aspirin should never be given to anyone younger than 18 years of age who is ill with a viral infection or fever. It may cause severe liver or brain damage.  · Your appetite may be poor, so a light diet is fine. Avoid dehydration by drinking 6 to 8 glasses of fluids per day (such as water, soft drinks, sports drinks, juices, tea, or soup). Extra fluids will help loosen secretions in the nose and lungs.  · Over-the-counter cough, cold, and sore-throat medicines will not shorten the length of the illness, but they may be helpful to reduce symptoms. (Note: Do not use decongestants if you have high blood pressure.)  · Finish all antibiotic medicine. Do this even if you are feeling better after only a few days.  Follow-up care  Follow up with your healthcare provider, or as advised. If you had an X-ray or ECG (electrocardiogram), a specialist will review it. You will be notified of any new findings that may affect your care.  Note: If you are age 65 or older, or if you have a chronic lung disease or condition that affects your immune system, or you smoke,  talk to your healthcare provider about having pneumococcal vaccinations and a yearly influenza vaccination (flu shot).  When to seek medical advice  Call your healthcare provider right away if any of these occur:  · Fever of 100.4°F (38°C) or higher  · Coughing up increased amounts of colored sputum  · Weakness, drowsiness, headache, facial pain, ear pain, or a stiff neck  Call 911, or get immediate medical care  Contact emergency services right away if any of these occur.  · Coughing up blood  · Worsening weakness, drowsiness, headache, or stiff neck  · Trouble breathing, wheezing, or pain with breathing  Date Last Reviewed: 9/13/2015  © 8567-3181 [employer]. [address] [place] [address], [place] [address]. All rights reserved. This information is not intended as a substitute for professional medical care. Always follow your healthcare professional's instructions.

## 2019-03-12 NOTE — ANESTHESIA PREPROCEDURE EVALUATION
11/15/2018  Facundo Lord Jr. is a 63 y.o., male.    Anesthesia Evaluation    I have reviewed the Patient Summary Reports.    I have reviewed the Nursing Notes.   I have reviewed the Medications.     Review of Systems  Anesthesia Hx:  History of prior surgery of interest to airway management or planning: Previous anesthesia: General 5/18 and 8/18 Surgery on buttock cyst with general anesthesia.  Airway issues documented on chart review include GETA, glidescope used , view on video-laryngoscopy Grade I    Denies Family Hx of Anesthesia complications.   Denies Personal Hx of Anesthesia complications.   Social:  Smoker    Hematology/Oncology:  Hematology Normal   Oncology Normal     EENT/Dental:EENT/Dental Normal   Cardiovascular:   Pacemaker (AICD medtronic) Hypertension Denies MI. CAD  asymptomatic  CHF (most recent EF 45%) Orthopnea (2-3 pillows)    Pulmonary:   COPD, moderate Sedentary with arthritis, unknown exercise tolerance   Renal/:  Renal/ Normal     Hepatic/GI:  Hepatic/GI Normal    Musculoskeletal:   Arthritis (RA)     Neurological:  Neurology Normal    Endocrine:  Endocrine Normal    Dermatological:  Skin Normal    Psych:  Psychiatric Normal           Physical Exam  General:  Morbid Obesity    Airway/Jaw/Neck:  Airway Findings: Mouth Opening: Normal Mallampati: II  TM Distance: Normal, at least 6 cm  Jaw/Neck Findings:  Neck Findings:  Girth Increased      Dental:  Dental Findings: In tact        Mental Status:  Mental Status Findings:  Cooperative, Alert and Oriented         Anesthesia Plan  Type of Anesthesia, risks & benefits discussed:  Anesthesia Type:  spinal  Patient's Preference:   Intra-op Monitoring Plan: standard ASA monitors  Intra-op Monitoring Plan Comments:   Post Op Pain Control Plan: per primary service following discharge from PACU  Post Op Pain Control Plan Comments:    Induction:    Beta Blocker:         Informed Consent: Patient understands risks and agrees with Anesthesia plan.  Questions answered. Anesthesia consent signed with patient.  ASA Score: 3     Day of Surgery Review of History & Physical:    H&P update referred to the surgeon.     Anesthesia Plan Notes: EKG/labs ordered today        Ready For Surgery From Anesthesia Perspective.        neck

## 2019-04-23 ENCOUNTER — HOSPITAL ENCOUNTER (INPATIENT)
Facility: HOSPITAL | Age: 64
LOS: 1 days | Discharge: HOME | DRG: 190 | End: 2019-04-24
Attending: EMERGENCY MEDICINE | Admitting: EMERGENCY MEDICINE
Payer: MEDICARE

## 2019-04-23 ENCOUNTER — OFFICE VISIT (OUTPATIENT)
Dept: URGENT CARE | Facility: CLINIC | Age: 64
End: 2019-04-23
Payer: MEDICARE

## 2019-04-23 VITALS
SYSTOLIC BLOOD PRESSURE: 133 MMHG | TEMPERATURE: 98 F | OXYGEN SATURATION: 96 % | DIASTOLIC BLOOD PRESSURE: 77 MMHG | BODY MASS INDEX: 42.66 KG/M2 | HEIGHT: 72 IN | WEIGHT: 315 LBS | HEART RATE: 94 BPM

## 2019-04-23 DIAGNOSIS — R05.9 COUGH: ICD-10-CM

## 2019-04-23 DIAGNOSIS — I50.9 CHF (CONGESTIVE HEART FAILURE): ICD-10-CM

## 2019-04-23 DIAGNOSIS — R06.02 SOB (SHORTNESS OF BREATH): ICD-10-CM

## 2019-04-23 DIAGNOSIS — I50.43 ACUTE ON CHRONIC COMBINED SYSTOLIC AND DIASTOLIC HEART FAILURE: Primary | ICD-10-CM

## 2019-04-23 DIAGNOSIS — I10 HYPERTENSION, UNSPECIFIED TYPE: ICD-10-CM

## 2019-04-23 DIAGNOSIS — M16.11 PRIMARY OSTEOARTHRITIS OF RIGHT HIP: ICD-10-CM

## 2019-04-23 DIAGNOSIS — J18.9 COMMUNITY ACQUIRED PNEUMONIA, UNSPECIFIED LATERALITY: ICD-10-CM

## 2019-04-23 DIAGNOSIS — J90 PLEURAL EFFUSION: Primary | ICD-10-CM

## 2019-04-23 DIAGNOSIS — J96.01 ACUTE HYPOXEMIC RESPIRATORY FAILURE: ICD-10-CM

## 2019-04-23 DIAGNOSIS — J44.1 ACUTE EXACERBATION OF CHRONIC OBSTRUCTIVE PULMONARY DISEASE (COPD): ICD-10-CM

## 2019-04-23 DIAGNOSIS — Z95.810 AICD (AUTOMATIC CARDIOVERTER/DEFIBRILLATOR) PRESENT: ICD-10-CM

## 2019-04-23 LAB
ALBUMIN SERPL BCP-MCNC: 3.3 G/DL (ref 3.5–5.2)
ALP SERPL-CCNC: 102 U/L (ref 55–135)
ALT SERPL W/O P-5'-P-CCNC: 10 U/L (ref 10–44)
ANION GAP SERPL CALC-SCNC: 13 MMOL/L (ref 8–16)
AST SERPL-CCNC: 18 U/L (ref 10–40)
BASOPHILS # BLD AUTO: 0.08 K/UL (ref 0–0.2)
BASOPHILS NFR BLD: 0.7 % (ref 0–1.9)
BILIRUB SERPL-MCNC: 0.7 MG/DL (ref 0.1–1)
BNP SERPL-MCNC: 12 PG/ML (ref 0–99)
BUN SERPL-MCNC: 18 MG/DL (ref 8–23)
CALCIUM SERPL-MCNC: 9.2 MG/DL (ref 8.7–10.5)
CHLORIDE SERPL-SCNC: 101 MMOL/L (ref 95–110)
CO2 SERPL-SCNC: 20 MMOL/L (ref 23–29)
CREAT SERPL-MCNC: 1.1 MG/DL (ref 0.5–1.4)
DIFFERENTIAL METHOD: ABNORMAL
EOSINOPHIL # BLD AUTO: 0.2 K/UL (ref 0–0.5)
EOSINOPHIL NFR BLD: 1.4 % (ref 0–8)
ERYTHROCYTE [DISTWIDTH] IN BLOOD BY AUTOMATED COUNT: 16.5 % (ref 11.5–14.5)
EST. GFR  (AFRICAN AMERICAN): >60 ML/MIN/1.73 M^2
EST. GFR  (NON AFRICAN AMERICAN): >60 ML/MIN/1.73 M^2
GLUCOSE SERPL-MCNC: 109 MG/DL (ref 70–110)
HCT VFR BLD AUTO: 44.7 % (ref 40–54)
HGB BLD-MCNC: 14.6 G/DL (ref 14–18)
IMM GRANULOCYTES # BLD AUTO: 0.14 K/UL (ref 0–0.04)
IMM GRANULOCYTES NFR BLD AUTO: 1.3 % (ref 0–0.5)
LACTATE SERPL-SCNC: 1.4 MMOL/L (ref 0.5–2.2)
LYMPHOCYTES # BLD AUTO: 1.5 K/UL (ref 1–4.8)
LYMPHOCYTES NFR BLD: 13 % (ref 18–48)
MCH RBC QN AUTO: 26.7 PG (ref 27–31)
MCHC RBC AUTO-ENTMCNC: 32.7 G/DL (ref 32–36)
MCV RBC AUTO: 82 FL (ref 82–98)
MONOCYTES # BLD AUTO: 1.3 K/UL (ref 0.3–1)
MONOCYTES NFR BLD: 11.4 % (ref 4–15)
NEUTROPHILS # BLD AUTO: 8.1 K/UL (ref 1.8–7.7)
NEUTROPHILS NFR BLD: 72.2 % (ref 38–73)
NRBC BLD-RTO: 0 /100 WBC
PLATELET # BLD AUTO: 335 K/UL (ref 150–350)
PMV BLD AUTO: 8.7 FL (ref 9.2–12.9)
POTASSIUM SERPL-SCNC: 4.7 MMOL/L (ref 3.5–5.1)
PROT SERPL-MCNC: 7.4 G/DL (ref 6–8.4)
RBC # BLD AUTO: 5.46 M/UL (ref 4.6–6.2)
SODIUM SERPL-SCNC: 134 MMOL/L (ref 136–145)
TROPONIN I SERPL DL<=0.01 NG/ML-MCNC: <0.006 NG/ML (ref 0–0.03)
WBC # BLD AUTO: 11.19 K/UL (ref 3.9–12.7)

## 2019-04-23 PROCEDURE — 83605 ASSAY OF LACTIC ACID: CPT

## 2019-04-23 PROCEDURE — 94761 N-INVAS EAR/PLS OXIMETRY MLT: CPT

## 2019-04-23 PROCEDURE — 99214 PR OFFICE/OUTPT VISIT, EST, LEVL IV, 30-39 MIN: ICD-10-PCS | Mod: S$GLB,,, | Performed by: FAMILY MEDICINE

## 2019-04-23 PROCEDURE — 99214 OFFICE O/P EST MOD 30 MIN: CPT | Mod: S$GLB,,, | Performed by: FAMILY MEDICINE

## 2019-04-23 PROCEDURE — 84484 ASSAY OF TROPONIN QUANT: CPT

## 2019-04-23 PROCEDURE — 12000002 HC ACUTE/MED SURGE SEMI-PRIVATE ROOM

## 2019-04-23 PROCEDURE — 3008F BODY MASS INDEX DOCD: CPT | Mod: CPTII,S$GLB,, | Performed by: FAMILY MEDICINE

## 2019-04-23 PROCEDURE — 25000242 PHARM REV CODE 250 ALT 637 W/ HCPCS: Performed by: STUDENT IN AN ORGANIZED HEALTH CARE EDUCATION/TRAINING PROGRAM

## 2019-04-23 PROCEDURE — 93010 EKG 12-LEAD: ICD-10-PCS | Mod: ,,, | Performed by: INTERNAL MEDICINE

## 2019-04-23 PROCEDURE — 83880 ASSAY OF NATRIURETIC PEPTIDE: CPT

## 2019-04-23 PROCEDURE — 96367 TX/PROPH/DG ADDL SEQ IV INF: CPT

## 2019-04-23 PROCEDURE — 96365 THER/PROPH/DIAG IV INF INIT: CPT

## 2019-04-23 PROCEDURE — 99285 EMERGENCY DEPT VISIT HI MDM: CPT | Mod: 25

## 2019-04-23 PROCEDURE — 96375 TX/PRO/DX INJ NEW DRUG ADDON: CPT

## 2019-04-23 PROCEDURE — 71046 X-RAY EXAM CHEST 2 VIEWS: CPT | Mod: FY,S$GLB,, | Performed by: RADIOLOGY

## 2019-04-23 PROCEDURE — 94640 AIRWAY INHALATION TREATMENT: CPT

## 2019-04-23 PROCEDURE — 3008F PR BODY MASS INDEX (BMI) DOCUMENTED: ICD-10-PCS | Mod: CPTII,S$GLB,, | Performed by: FAMILY MEDICINE

## 2019-04-23 PROCEDURE — 93005 ELECTROCARDIOGRAM TRACING: CPT

## 2019-04-23 PROCEDURE — 85025 COMPLETE CBC W/AUTO DIFF WBC: CPT

## 2019-04-23 PROCEDURE — 99284 EMERGENCY DEPT VISIT MOD MDM: CPT | Mod: ,,, | Performed by: EMERGENCY MEDICINE

## 2019-04-23 PROCEDURE — 80053 COMPREHEN METABOLIC PANEL: CPT

## 2019-04-23 PROCEDURE — 99284 PR EMERGENCY DEPT VISIT,LEVEL IV: ICD-10-PCS | Mod: ,,, | Performed by: EMERGENCY MEDICINE

## 2019-04-23 PROCEDURE — 71046 XR CHEST PA AND LATERAL: ICD-10-PCS | Mod: FY,S$GLB,, | Performed by: RADIOLOGY

## 2019-04-23 PROCEDURE — 63600175 PHARM REV CODE 636 W HCPCS: Performed by: STUDENT IN AN ORGANIZED HEALTH CARE EDUCATION/TRAINING PROGRAM

## 2019-04-23 PROCEDURE — 93010 ELECTROCARDIOGRAM REPORT: CPT | Mod: ,,, | Performed by: INTERNAL MEDICINE

## 2019-04-23 RX ORDER — SPIRONOLACTONE 25 MG/1
25 TABLET ORAL DAILY
COMMUNITY

## 2019-04-23 RX ORDER — IPRATROPIUM BROMIDE AND ALBUTEROL SULFATE 2.5; .5 MG/3ML; MG/3ML
3 SOLUTION RESPIRATORY (INHALATION)
Status: COMPLETED | OUTPATIENT
Start: 2019-04-23 | End: 2019-04-23

## 2019-04-23 RX ORDER — FUROSEMIDE 10 MG/ML
40 INJECTION INTRAMUSCULAR; INTRAVENOUS
Status: COMPLETED | OUTPATIENT
Start: 2019-04-23 | End: 2019-04-23

## 2019-04-23 RX ORDER — AMIODARONE HYDROCHLORIDE 200 MG/1
TABLET ORAL DAILY
COMMUNITY

## 2019-04-23 RX ORDER — ASPIRIN 81 MG/1
81 TABLET ORAL DAILY
COMMUNITY

## 2019-04-23 RX ORDER — PENTOXIFYLLINE 400 MG/1
400 TABLET, EXTENDED RELEASE ORAL
COMMUNITY

## 2019-04-23 RX ORDER — CLOPIDOGREL BISULFATE 75 MG/1
75 TABLET ORAL DAILY
COMMUNITY

## 2019-04-23 RX ADMIN — IPRATROPIUM BROMIDE AND ALBUTEROL SULFATE 3 ML: .5; 3 SOLUTION RESPIRATORY (INHALATION) at 10:04

## 2019-04-23 RX ADMIN — FUROSEMIDE 40 MG: 10 INJECTION, SOLUTION INTRAMUSCULAR; INTRAVENOUS at 10:04

## 2019-04-24 VITALS
RESPIRATION RATE: 19 BRPM | OXYGEN SATURATION: 92 % | TEMPERATURE: 98 F | BODY MASS INDEX: 42.66 KG/M2 | HEIGHT: 72 IN | HEART RATE: 88 BPM | WEIGHT: 315 LBS | DIASTOLIC BLOOD PRESSURE: 87 MMHG | SYSTOLIC BLOOD PRESSURE: 145 MMHG

## 2019-04-24 PROBLEM — J18.9 CAP (COMMUNITY ACQUIRED PNEUMONIA): Status: ACTIVE | Noted: 2019-04-24

## 2019-04-24 PROBLEM — R05.9 COUGH: Status: ACTIVE | Noted: 2019-04-24

## 2019-04-24 PROBLEM — I50.33 ACUTE ON CHRONIC DIASTOLIC HEART FAILURE: Status: ACTIVE | Noted: 2019-04-24

## 2019-04-24 PROBLEM — J96.01 ACUTE HYPOXEMIC RESPIRATORY FAILURE: Status: ACTIVE | Noted: 2019-04-24

## 2019-04-24 PROBLEM — Z95.810 AICD (AUTOMATIC CARDIOVERTER/DEFIBRILLATOR) PRESENT: Status: ACTIVE | Noted: 2019-04-24

## 2019-04-24 PROBLEM — J44.1 ACUTE EXACERBATION OF CHRONIC OBSTRUCTIVE PULMONARY DISEASE (COPD): Status: ACTIVE | Noted: 2019-04-24

## 2019-04-24 PROBLEM — I10 HTN (HYPERTENSION): Status: ACTIVE | Noted: 2019-04-24

## 2019-04-24 PROBLEM — I50.43 ACUTE ON CHRONIC COMBINED SYSTOLIC AND DIASTOLIC HEART FAILURE: Status: ACTIVE | Noted: 2019-04-24

## 2019-04-24 PROCEDURE — 25000003 PHARM REV CODE 250: Performed by: PHYSICIAN ASSISTANT

## 2019-04-24 PROCEDURE — 99223 PR INITIAL HOSPITAL CARE,LEVL III: ICD-10-PCS | Mod: ,,, | Performed by: PHYSICIAN ASSISTANT

## 2019-04-24 PROCEDURE — 99900035 HC TECH TIME PER 15 MIN (STAT)

## 2019-04-24 PROCEDURE — 25000242 PHARM REV CODE 250 ALT 637 W/ HCPCS: Performed by: PHYSICIAN ASSISTANT

## 2019-04-24 PROCEDURE — 99223 1ST HOSP IP/OBS HIGH 75: CPT | Mod: ,,, | Performed by: PHYSICIAN ASSISTANT

## 2019-04-24 PROCEDURE — 11000001 HC ACUTE MED/SURG PRIVATE ROOM

## 2019-04-24 PROCEDURE — 99239 HOSP IP/OBS DSCHRG MGMT >30: CPT | Mod: GC,,, | Performed by: HOSPITALIST

## 2019-04-24 PROCEDURE — 25000003 PHARM REV CODE 250: Performed by: STUDENT IN AN ORGANIZED HEALTH CARE EDUCATION/TRAINING PROGRAM

## 2019-04-24 PROCEDURE — S4991 NICOTINE PATCH NONLEGEND: HCPCS | Performed by: HOSPITALIST

## 2019-04-24 PROCEDURE — 63600175 PHARM REV CODE 636 W HCPCS: Performed by: PHYSICIAN ASSISTANT

## 2019-04-24 PROCEDURE — 63600175 PHARM REV CODE 636 W HCPCS: Performed by: STUDENT IN AN ORGANIZED HEALTH CARE EDUCATION/TRAINING PROGRAM

## 2019-04-24 PROCEDURE — 94761 N-INVAS EAR/PLS OXIMETRY MLT: CPT

## 2019-04-24 PROCEDURE — 94640 AIRWAY INHALATION TREATMENT: CPT

## 2019-04-24 PROCEDURE — 63600175 PHARM REV CODE 636 W HCPCS: Performed by: HOSPITALIST

## 2019-04-24 PROCEDURE — 99239 PR HOSPITAL DISCHARGE DAY,>30 MIN: ICD-10-PCS | Mod: GC,,, | Performed by: HOSPITALIST

## 2019-04-24 PROCEDURE — 25000003 PHARM REV CODE 250: Performed by: HOSPITALIST

## 2019-04-24 RX ORDER — ATORVASTATIN CALCIUM 20 MG/1
40 TABLET, FILM COATED ORAL NIGHTLY
Status: DISCONTINUED | OUTPATIENT
Start: 2019-04-24 | End: 2019-04-24 | Stop reason: HOSPADM

## 2019-04-24 RX ORDER — IPRATROPIUM BROMIDE AND ALBUTEROL SULFATE 2.5; .5 MG/3ML; MG/3ML
3 SOLUTION RESPIRATORY (INHALATION) EVERY 4 HOURS
Status: COMPLETED | OUTPATIENT
Start: 2019-04-24 | End: 2019-04-24

## 2019-04-24 RX ORDER — PREDNISONE 20 MG/1
40 TABLET ORAL DAILY
Qty: 6 TABLET | Refills: 0 | Status: SHIPPED | OUTPATIENT
Start: 2019-04-25 | End: 2019-04-28

## 2019-04-24 RX ORDER — METHYLPREDNISOLONE SOD SUCC 125 MG
125 VIAL (EA) INJECTION
Status: COMPLETED | OUTPATIENT
Start: 2019-04-24 | End: 2019-04-24

## 2019-04-24 RX ORDER — IPRATROPIUM BROMIDE AND ALBUTEROL SULFATE 2.5; .5 MG/3ML; MG/3ML
3 SOLUTION RESPIRATORY (INHALATION) EVERY 6 HOURS PRN
Qty: 1 BOX | Refills: 0 | Status: SHIPPED | OUTPATIENT
Start: 2019-04-24 | End: 2020-04-28 | Stop reason: SDUPTHER

## 2019-04-24 RX ORDER — PREDNISONE 20 MG/1
40 TABLET ORAL DAILY
Status: DISCONTINUED | OUTPATIENT
Start: 2019-04-25 | End: 2019-04-24 | Stop reason: HOSPADM

## 2019-04-24 RX ORDER — PENTOXIFYLLINE 400 MG/1
400 TABLET, EXTENDED RELEASE ORAL
Status: DISCONTINUED | OUTPATIENT
Start: 2019-04-24 | End: 2019-04-24 | Stop reason: HOSPADM

## 2019-04-24 RX ORDER — ENOXAPARIN SODIUM 100 MG/ML
40 INJECTION SUBCUTANEOUS EVERY 24 HOURS
Status: DISCONTINUED | OUTPATIENT
Start: 2019-04-24 | End: 2019-04-24 | Stop reason: HOSPADM

## 2019-04-24 RX ORDER — LOSARTAN POTASSIUM 50 MG/1
50 TABLET ORAL DAILY
Status: DISCONTINUED | OUTPATIENT
Start: 2019-04-24 | End: 2019-04-24 | Stop reason: HOSPADM

## 2019-04-24 RX ORDER — BENZONATATE 100 MG/1
100 CAPSULE ORAL 3 TIMES DAILY PRN
Status: DISCONTINUED | OUTPATIENT
Start: 2019-04-24 | End: 2019-04-24 | Stop reason: HOSPADM

## 2019-04-24 RX ORDER — AMIODARONE HYDROCHLORIDE 200 MG/1
200 TABLET ORAL DAILY
Status: DISCONTINUED | OUTPATIENT
Start: 2019-04-24 | End: 2019-04-24 | Stop reason: HOSPADM

## 2019-04-24 RX ORDER — METOPROLOL SUCCINATE 50 MG/1
50 TABLET, EXTENDED RELEASE ORAL 2 TIMES DAILY
Status: DISCONTINUED | OUTPATIENT
Start: 2019-04-24 | End: 2019-04-24 | Stop reason: HOSPADM

## 2019-04-24 RX ORDER — BENZONATATE 100 MG/1
100 CAPSULE ORAL 3 TIMES DAILY PRN
Qty: 60 CAPSULE | Refills: 0 | Status: SHIPPED | OUTPATIENT
Start: 2019-04-24 | End: 2019-05-04

## 2019-04-24 RX ORDER — LEVOFLOXACIN 750 MG/1
750 TABLET ORAL DAILY
Status: DISCONTINUED | OUTPATIENT
Start: 2019-04-24 | End: 2019-04-24 | Stop reason: HOSPADM

## 2019-04-24 RX ORDER — FOLIC ACID 1 MG/1
1 TABLET ORAL DAILY
Status: DISCONTINUED | OUTPATIENT
Start: 2019-04-24 | End: 2019-04-24 | Stop reason: HOSPADM

## 2019-04-24 RX ORDER — FUROSEMIDE 10 MG/ML
40 INJECTION INTRAMUSCULAR; INTRAVENOUS DAILY
Status: DISCONTINUED | OUTPATIENT
Start: 2019-04-24 | End: 2019-04-24 | Stop reason: HOSPADM

## 2019-04-24 RX ORDER — ASPIRIN 81 MG/1
81 TABLET ORAL DAILY
Status: DISCONTINUED | OUTPATIENT
Start: 2019-04-24 | End: 2019-04-24 | Stop reason: HOSPADM

## 2019-04-24 RX ORDER — IBUPROFEN 200 MG
1 TABLET ORAL DAILY
Refills: 0 | COMMUNITY
Start: 2019-04-25 | End: 2020-02-19

## 2019-04-24 RX ORDER — CLOPIDOGREL BISULFATE 75 MG/1
75 TABLET ORAL DAILY
Status: DISCONTINUED | OUTPATIENT
Start: 2019-04-24 | End: 2019-04-24 | Stop reason: HOSPADM

## 2019-04-24 RX ORDER — IBUPROFEN 200 MG
1 TABLET ORAL DAILY
Status: DISCONTINUED | OUTPATIENT
Start: 2019-04-24 | End: 2019-04-24 | Stop reason: HOSPADM

## 2019-04-24 RX ORDER — SPIRONOLACTONE 25 MG/1
25 TABLET ORAL DAILY
Status: DISCONTINUED | OUTPATIENT
Start: 2019-04-24 | End: 2019-04-24 | Stop reason: HOSPADM

## 2019-04-24 RX ORDER — SODIUM CHLORIDE 0.9 % (FLUSH) 0.9 %
10 SYRINGE (ML) INJECTION
Status: DISCONTINUED | OUTPATIENT
Start: 2019-04-24 | End: 2019-04-24 | Stop reason: HOSPADM

## 2019-04-24 RX ORDER — LEVOFLOXACIN 750 MG/1
750 TABLET ORAL DAILY
Qty: 5 TABLET | Refills: 0 | Status: SHIPPED | OUTPATIENT
Start: 2019-04-25 | End: 2020-04-28

## 2019-04-24 RX ORDER — FUROSEMIDE 40 MG/1
40 TABLET ORAL 2 TIMES DAILY
Qty: 30 TABLET | Refills: 1 | Status: SHIPPED | OUTPATIENT
Start: 2019-04-24 | End: 2020-04-28

## 2019-04-24 RX ADMIN — CEFTRIAXONE 2 G: 2 INJECTION, SOLUTION INTRAVENOUS at 01:04

## 2019-04-24 RX ADMIN — NICOTINE 1 PATCH: 14 PATCH, EXTENDED RELEASE TRANSDERMAL at 12:04

## 2019-04-24 RX ADMIN — PREDNISONE 50 MG: 20 TABLET ORAL at 09:04

## 2019-04-24 RX ADMIN — IPRATROPIUM BROMIDE AND ALBUTEROL SULFATE 3 ML: .5; 3 SOLUTION RESPIRATORY (INHALATION) at 12:04

## 2019-04-24 RX ADMIN — METOPROLOL SUCCINATE 50 MG: 50 TABLET, EXTENDED RELEASE ORAL at 04:04

## 2019-04-24 RX ADMIN — AZITHROMYCIN DIHYDRATE 500 MG: 500 INJECTION, POWDER, LYOPHILIZED, FOR SOLUTION INTRAVENOUS at 02:04

## 2019-04-24 RX ADMIN — ATORVASTATIN CALCIUM 40 MG: 20 TABLET, FILM COATED ORAL at 04:04

## 2019-04-24 RX ADMIN — LOSARTAN POTASSIUM 50 MG: 50 TABLET, FILM COATED ORAL at 09:04

## 2019-04-24 RX ADMIN — IPRATROPIUM BROMIDE AND ALBUTEROL SULFATE 3 ML: .5; 3 SOLUTION RESPIRATORY (INHALATION) at 08:04

## 2019-04-24 RX ADMIN — GUAIFENESIN AND DEXTROMETHORPHAN HYDROBROMIDE 1 TABLET: 600; 30 TABLET, EXTENDED RELEASE ORAL at 09:04

## 2019-04-24 RX ADMIN — IPRATROPIUM BROMIDE AND ALBUTEROL SULFATE 3 ML: .5; 3 SOLUTION RESPIRATORY (INHALATION) at 02:04

## 2019-04-24 RX ADMIN — ASPIRIN 81 MG: 81 TABLET, COATED ORAL at 09:04

## 2019-04-24 RX ADMIN — GUAIFENESIN AND DEXTROMETHORPHAN HYDROBROMIDE 1 TABLET: 600; 30 TABLET, EXTENDED RELEASE ORAL at 04:04

## 2019-04-24 RX ADMIN — METHYLPREDNISOLONE SODIUM SUCCINATE 125 MG: 125 INJECTION, POWDER, FOR SOLUTION INTRAMUSCULAR; INTRAVENOUS at 01:04

## 2019-04-24 RX ADMIN — AMIODARONE HYDROCHLORIDE 200 MG: 200 TABLET ORAL at 09:04

## 2019-04-24 RX ADMIN — PENTOXIFYLLINE 400 MG: 400 TABLET, EXTENDED RELEASE ORAL at 09:04

## 2019-04-24 RX ADMIN — PENTOXIFYLLINE 400 MG: 400 TABLET, EXTENDED RELEASE ORAL at 04:04

## 2019-04-24 RX ADMIN — CLOPIDOGREL 75 MG: 75 TABLET, FILM COATED ORAL at 09:04

## 2019-04-24 RX ADMIN — LEVOFLOXACIN 750 MG: 750 TABLET, FILM COATED ORAL at 11:04

## 2019-04-24 RX ADMIN — FOLIC ACID 1 MG: 1 TABLET ORAL at 09:04

## 2019-04-24 RX ADMIN — SPIRONOLACTONE 25 MG: 25 TABLET, FILM COATED ORAL at 09:04

## 2019-04-24 RX ADMIN — FUROSEMIDE 40 MG: 10 INJECTION, SOLUTION INTRAMUSCULAR; INTRAVENOUS at 12:04

## 2019-04-24 NOTE — CONSULTS
Consult received per nurse for blister to left great toe. Pt was admitted on 4/23/19 from Urgent Care for evaluation of fluid on the left lung that was on CXR for possible CHF. Chart reflects that pt has a PMHx of COPD, HFrEF w/ AICD, HTN and had an angiogram on 4/16/19 with no stents placed.    Received pt awake and alert with wife and physician at bedside discussing pt's plan of care.     Upon assessment of dorsal left foot: Per wife, pt had a blister to the top of his left foot that is in the process of healing. She stated she had put medication on the area that was black in color. Small dry dark scab noted to foot that was easily removed with dry gauze. Pink intact skin without any areas of weeping or drainage noted. Applied sensi care skin barrier film to area and applied a border foam to protect healed blister.    (see photo below)    Recommendations:  -Nursing to apply border foam PRN over healed blister to left foot.  -Continue pressure prevention interventions as directed.    Discussed plan of care with pt, pt's wife at bedside and with pt's nurse, Luz.    Wound care to sign off at this time. Please re-consult if further assistance is needed. [id number]        Left Dorsal Foot

## 2019-04-24 NOTE — PATIENT INSTRUCTIONS
Pt wife will take patient to [facility] ER for further evaluation of SOB, Pleural effusion.    Rj Yo MD

## 2019-04-24 NOTE — ED PROVIDER NOTES
Encounter Date: 4/23/2019       History     Chief Complaint   Patient presents with    Shortness of Breath     sent from  for eval of fluid on left lung that was on CXR for possible CHF, had angiogram last Tuesday no stents placed. History of COPD, SOB x3 weeks, worse today     63M PMHx COPD, HFrEF w/ AICD, HTN presents to ED as a referral from urgent care this morning where he presented c/o 3 weeks worsening cough productive of clear sputum and 4 days worsening LECHUGA. Denies cp, f/c, nightsweats, n/v/d. No recent sick contacts. CXR at urgent care demonstrated fuad-hilar pulmonary edema with associated LLL consolidation/opacification concerning for asymmetric edema vs developing PNA. Pt states he has had similar episodes of worsening SoB in the past that turned out to be COPD exacerbations.         Review of patient's allergies indicates:   Allergen Reactions    Lisinopril Rash     Past Medical History:   Diagnosis Date    AICD (automatic cardioverter/defibrillator) present     Anticoagulant long-term use     Cancer     CHF (congestive heart failure)     COPD (chronic obstructive pulmonary disease)     Coronary artery disease     Emphysema of lung     Hypertension     Rheumatoid arthritis      Past Surgical History:   Procedure Laterality Date    anal fistula excision  08/2018    ARTHROPLASTY, HIP Right 11/26/2018    [facility] at [facility]    bladder cancer surgeruy Bilateral     CARDIAC DEFIBRILLATOR PLACEMENT      excision mass buttocks  05/04/2018    EXCISION-MASS-BUTTOCKS Left 5/4/2018    Performed by Rohit [facility]razo [facility] at [facility] OR    JOINT REPLACEMENT      pace maker      defibrillator    RELEASE, TENDON, FLEXOR, HIP, OR HIP ADDUCTOR TENOTOMY Right 11/26/2018    Performed by [facility] at [facility] OR    TURBT       History reviewed. No pertinent family history.  Social History     Tobacco Use    Smoking status: Current Every Day Smoker     Packs/day: 0.50      Start date: 1972    Smokeless tobacco: Never Used   Substance Use Topics    Alcohol use: Yes     Comment: Seldom    Drug use: Not on file     Review of Systems   Constitutional: Negative for chills and fever.   Respiratory: Positive for cough and shortness of breath.    Cardiovascular: Negative for chest pain and leg swelling.   All other systems reviewed and are negative.      Physical Exam     Initial Vitals [04/23/19 2112]   BP Pulse Resp Temp SpO2   (!) 154/78 89 20 98.6 °F (37 °C) 95 %      MAP       --         Physical Exam    Nursing note and vitals reviewed.  Constitutional: He appears well-developed and well-nourished.  Non-toxic appearance. He does not appear ill. No distress.   HENT:   Head: Normocephalic and atraumatic.   Mouth/Throat: Oropharynx is clear and moist. No oropharyngeal exudate.   Eyes: Pupils are equal, round, and reactive to light.   Neck: Neck supple.   Cardiovascular: Normal rate and regular rhythm.   Pulmonary/Chest: Tachypnea noted. He has wheezes. He has no rhonchi. He has no rales.   Abdominal: Soft. There is no tenderness.   Musculoskeletal: Normal range of motion.        Right lower leg: Normal.        Left lower leg: Normal.   Neurological: He is alert and oriented to person, place, and time.   Skin: Skin is warm and dry.   Psychiatric: He has a normal mood and affect.         ED Course   Procedures  Labs Reviewed   CBC W/ AUTO DIFFERENTIAL - Abnormal; Notable for the following components:       Result Value    MCH 26.7 (*)     RDW 16.5 (*)     MPV 8.7 (*)     Immature Granulocytes 1.3 (*)     Gran # (ANC) 8.1 (*)     Immature Grans (Abs) 0.14 (*)     Mono # 1.3 (*)     Lymph% 13.0 (*)     All other components within normal limits   COMPREHENSIVE METABOLIC PANEL - Abnormal; Notable for the following components:    Sodium 134 (*)     CO2 20 (*)     Albumin 3.3 (*)     All other components within normal limits    Narrative:     add on BNP order [id number] per Dr. Zoila Simon   04/23/2019  23:18    LACTIC ACID, PLASMA   B-TYPE NATRIURETIC PEPTIDE   TROPONIN I   B-TYPE NATRIURETIC PEPTIDE    Narrative:     add on BNP order [id number] per Dr. Zoila Simon  04/23/2019  23:18   ADD ON TROP # [id number] PER DR.Ashley Simon @  04/23/2019  23:48     TROPONIN I    Narrative:     add on BNP order [id number] per Dr. Zoila Simon  04/23/2019  23:18   ADD ON TROP # [id number] PER DR.Ashley Simon @  04/23/2019  23:48          ECG Results          EKG 12-lead (Final result)  Result time 04/24/19 15:18:38    Final result by Interface, Lab In [facility] (04/24/19 15:18:38)                 Narrative:    Test Reason : R06.02,    Vent. Rate : 089 BPM     Atrial Rate : 089 BPM     P-R Int : 170 ms          QRS Dur : 094 ms      QT Int : 358 ms       P-R-T Axes : 059 028 072 degrees     QTc Int : 435 ms    Normal sinus rhythm  Normal ECG  When compared with ECG of 15-NOV-2018 11:56,  Premature ventricular complexes are no longer Present  Confirmed by Ermelinda Moody MD ([id number]) on 4/24/2019 3:18:29 PM    Referred By: [email or web address]   SELF           Confirmed By:Ermelinda Moody MD                             EKG 12-lead (Final result)  Result time 04/24/19 14:54:31    Final result by Interface, Lab In [facility] (04/24/19 14:54:31)                 Narrative:    Test Reason :     Vent. Rate : 092 BPM     Atrial Rate : 092 BPM     P-R Int : 166 ms          QRS Dur : 094 ms      QT Int : 350 ms       P-R-T Axes : 052 028 067 degrees     QTc Int : 432 ms    Sinus rhythm with Premature ventricular complexes  Abnormal ECG  When compared with ECG of 15-NOV-2018 11:56,  Sinus rhythm has replaced Electronic atrial pacemaker  Premature ventricular complexes are now Present  Confirmed by Ermelinda Moody MD ([id number]) on 4/24/2019 2:54:19 PM    Referred By: [email or web address]   SELF           Confirmed By:Ermelinda Moody MD                            Imaging Results    None          Medical Decision Making:   Initial Assessment:   COPD  exacerbation vs acute on chronic CHF given pulmonary edema with LLL consolidation. Pt is hemodynamically stable, no fevers, sats 95% on RA at rest though dyspneic on exertion with desats to 80s. Has had multiple COPD exacerbations in the past (estimates 2 episodes per year recently)  Differential Diagnosis:   Lobar PNA  Atypical PNA  Aspiration   COPD exacerbation  Acute on chronic CHF  Clinical Tests:   Lab Tests: Ordered  Radiological Study: Reviewed  Medical Tests: Reviewed  ED Management:  - Labs ordered  - Duonebs  - Lasix  - Ceftriaxone, azithromycin  - Methylprednisolone  - Hospital medicine admit       APC / Resident Notes:   Trops negative, BNP 12 and pt states has had recent echo a week ago with EF55 despite hx of EF20, this is more consistent with infectious COPD exacerbation > CHF exacerbation (pulmonary edema, LLL consolidation + WCC 11 + left shift, increased productivity of cough)          Attending Attestation:   Physician Attestation Statement for Resident:  As the supervising MD   Physician Attestation Statement: I have personally seen and examined this patient.   I agree with the above history. -:   As the supervising MD I agree with the above PE.    As the supervising MD I agree with the above treatment, course, plan, and disposition.                       Clinical Impression:       ICD-10-CM ICD-9-CM   1. Acute on chronic combined systolic and diastolic heart failure I50.43 428.43   2. SOB (shortness of breath) R06.02 786.05   3. Acute exacerbation of chronic obstructive pulmonary disease (COPD) J44.1 491.21   4. CHF (congestive heart failure) I50.9 428.0   5. Community acquired pneumonia, unspecified laterality J18.9 486   6. AICD (automatic cardioverter/defibrillator) present Z95.810 V45.02   7. Acute hypoxemic respiratory failure J96.01 518.81   8. Cough R05 786.2   9. Hypertension, unspecified type I10 401.9   10. Primary osteoarthritis of right hip M16.11 715.15                                 Phoenix Dsouza MD  Resident  04/24/19 1617       Zoila Simon MD  04/25/19 [id number]

## 2019-04-24 NOTE — ED NOTES
Telemetry Verification   Patient placed on Telemetry Box  Verified with War Room  Box # [id number]   Monitor Tech Marilu   Rate 72   Rhythm NSR

## 2019-04-24 NOTE — HPI
Patient is a 64yo male with a PMHx of COPD, HFrEF w/ AICD, HTN being admitted to medicine for acute COPD exacerbation. He presented to ED as a referral from urgent care this morning where he presented c/o 3 weeks worsening cough productive of clear sputum and 4 days worsening LECHUGA. He denies fevers, chills, chest pain, nightsweats, nausea, and vomiting, f/c. He denies recent sick contacts. His CXR at urgent care demonstrated fuad-hilar pulmonary edema with associated LLL consolidation/opacification concerning for asymmetric edema vs developing PNA. Patient states he has had similar episodes of worsening SoB in the past that turned out to be COPD exacerbations.     In the ED, patient hypoxic to 93% on room air, worse with ambulation. He was given duonebs, solumedrol, and antibiotics and admitted to medicine for further management.

## 2019-04-24 NOTE — H&P
[facility] Medicine  History & Physical    Patient Name: Facundo Lord Jr.  MRN: [id number]  Admission Date: 4/23/2019  Attending Physician: Zoila Simon MD   Primary Care Provider: Sanjay Araujo MD    [facility] Medicine Team: [facility] MED T Leonel Lopez PA-C     Patient information was obtained from patient, past medical records and ER records.     Subjective:     Principal Problem:Acute exacerbation of chronic obstructive pulmonary disease (COPD)    Chief Complaint:   Chief Complaint   Patient presents with    Shortness of Breath     sent from  for eval of fluid on left lung that was on CXR for possible CHF, had angiogram last Tuesday no stents placed. History of COPD, SOB x3 weeks, worse today        HPI: Patient is a 62yo male with a PMHx of COPD, HFrEF w/ AICD, HTN being admitted to medicine for acute COPD exacerbation. He presented to ED as a referral from urgent care this morning where he presented c/o 3 weeks worsening cough productive of clear sputum and 4 days worsening LECHUGA. He denies fevers, chills, chest pain, nightsweats, nausea, and vomiting, f/c. He denies recent sick contacts. His CXR at urgent care demonstrated fuad-hilar pulmonary edema with associated LLL consolidation/opacification concerning for asymmetric edema vs developing PNA. Patient states he has had similar episodes of worsening SoB in the past that turned out to be COPD exacerbations.     In the ED, patient hypoxic to 93% on room air, worse with ambulation. He was given duonebs, solumedrol, and antibiotics and admitted to medicine for further management.    Past Medical History:   Diagnosis Date    AICD (automatic cardioverter/defibrillator) present     Anticoagulant long-term use     Cancer     CHF (congestive heart failure)     COPD (chronic obstructive pulmonary disease)     Coronary artery disease     Emphysema of lung     Hypertension     Rheumatoid arthritis        Past  Surgical History:   Procedure Laterality Date    anal fistula excision  08/2018    ARTHROPLASTY, HIP Right 11/26/2018    [facility]    bladder cancer surgeruy Bilateral     CARDIAC DEFIBRILLATOR PLACEMENT      excision mass buttocks  05/04/2018    EXCISION-MASS-BUTTOCKS Left 5/4/2018    Performed by [facility]o [facility] at [facility]    JOINT REPLACEMENT      pace maker      defibrillator    RELEASE, TENDON, FLEXOR, HIP, OR HIP ADDUCTOR TENOTOMY Right 11/26/2018    [facility]    [facility]         Review of patient's allergies indicates:   Allergen Reactions    Lisinopril Rash       No current facility-administered medications on file prior to encounter.      Current Outpatient Medications on File Prior to Encounter   Medication Sig    amiodarone (PACERONE) 200 MG Tab Take by mouth once daily.    aspirin (ECOTRIN) 81 MG EC tablet Take 81 mg by mouth once daily.    atorvastatin (LIPITOR) 40 MG tablet Take 40 mg by mouth once daily.    clopidogrel (PLAVIX) 75 mg tablet Take 75 mg by mouth once daily.    folic acid (FOLVITE) 1 MG tablet Take 1 mg by mouth once daily.    losartan (COZAAR) 50 MG tablet Take 50 mg by mouth once daily.    metoprolol succinate (TOPROL-XL) 50 MG 24 hr tablet Take 50 mg by mouth 2 (two) times daily.     pentoxifylline (TRENTAL) 400 mg TbSR Take 400 mg by mouth 3 (three) times daily with meals.    potassium chloride (MICRO-K) 10 MEQ CpSR Take 20 mEq by mouth once.     spironolactone (ALDACTONE) 25 MG tablet Take 25 mg by mouth once daily.    buPROPion (WELLBUTRIN SR) 150 MG TBSR 12 hr tablet Take 1 tablet (150 mg total) by mouth 2 (two) times daily. (Patient taking differently: Take 150 mg by mouth once daily. )     Family History     None        Tobacco Use    Smoking status: Current Every Day Smoker     Packs/day: 0.50     Start date: 1972    Smokeless tobacco: Never Used   Substance and Sexual Activity     Alcohol use: Yes     Comment: Seldom    Drug use: Not on file    Sexual activity: Not on file     Review of Systems   Constitutional: Positive for activity change. Negative for chills and fever.   HENT: Negative for trouble swallowing.    Eyes: Negative for photophobia and visual disturbance.   Respiratory: Positive for cough, chest tightness, shortness of breath and wheezing.    Cardiovascular: Negative for chest pain, palpitations and leg swelling.   Gastrointestinal: Negative for abdominal distention, constipation, diarrhea, nausea and vomiting.   Genitourinary: Negative for dysuria, frequency and hematuria.   Musculoskeletal: Negative for back pain, gait problem and neck stiffness.   Skin: Negative for rash and wound.   Neurological: Negative for dizziness, speech difficulty, weakness and light-headedness.   Psychiatric/Behavioral: Negative for agitation, confusion and suicidal ideas. The patient is not nervous/anxious.      Objective:     Vital Signs (Most Recent):  Temp: 98.6 °F (37 °C) (04/23/19 2112)  Pulse: 69 (04/24/19 0259)  Resp: 18 (04/24/19 0259)  BP: (!) 144/64 (04/24/19 0232)  SpO2: (!) 94 % (04/24/19 0259) Vital Signs (24h Range):  Temp:  [98.1 °F (36.7 °C)-98.6 °F (37 °C)] 98.6 °F (37 °C)  Pulse:  [] 69  Resp:  [13-24] 18  SpO2:  [93 %-97 %] 94 %  BP: (115-154)/(60-89) 144/64     Weight: (!) 162.3 kg (357 lb 12.9 oz)  Body mass index is 48.53 kg/m².    Physical Exam   Constitutional: He is oriented to person, place, and time. He appears well-developed and well-nourished. No distress.   HENT:   Head: Normocephalic and atraumatic.   Mouth/Throat: No oropharyngeal exudate.   Eyes: Pupils are equal, round, and reactive to light. EOM are normal. No scleral icterus.   Neck: Normal range of motion. Neck supple.   Cardiovascular: Normal rate and regular rhythm.   Murmur heard.  Pulmonary/Chest: Accessory muscle usage present. Tachypnea noted. He has wheezes. He has rales in the left middle field  and the left lower field.   Abdominal: Soft. Bowel sounds are normal. He exhibits no distension. There is no tenderness.   Musculoskeletal: Normal range of motion. He exhibits edema (trace BLE). He exhibits no tenderness.   Lymphadenopathy:     He has no cervical adenopathy.   Neurological: He is alert and oriented to person, place, and time. No cranial nerve deficit or sensory deficit.   Skin: Skin is warm and dry. No rash noted.   Psychiatric: He has a normal mood and affect. His behavior is normal. Thought content normal.   Nursing note and vitals reviewed.        CRANIAL NERVES     CN III, IV, VI   Pupils are equal, round, and reactive to light.  Extraocular motions are normal.        Significant Labs:   BMP:   Recent Labs   Lab 04/23/19  2212      *   K 4.7      CO2 20*   BUN 18   CREATININE 1.1   CALCIUM 9.2     CBC:   Recent Labs   Lab 04/23/19 2212   WBC 11.19   HGB 14.6   HCT 44.7        Urine Studies: No results for input(s): COLORU, APPEARANCEUA, PHUR, SPECGRAV, PROTEINUA, GLUCUA, KETONESU, BILIRUBINUA, OCCULTUA, NITRITE, UROBILINOGEN, LEUKOCYTESUR, RBCUA, WBCUA, BACTERIA, SQUAMEPITHEL, HYALINECASTS in the last 48 hours.    Invalid input(s): WRIGHTSUR    Significant Imaging: I have reviewed all pertinent imaging results/findings within the past 24 hours.    Assessment/Plan:     * Acute exacerbation of chronic obstructive pulmonary disease (COPD)  CAP    Patient presents with 3 weeks of worsening productive cough, LECHUGA, and shortness of breath. Patient desaturaing to 80s on room air. Patient reports continued cigarette smoking, 1 pack lasts him 3 days.  - CXR with worsening LLL consolidation  - given azithromycin 500mg + 2g ceftriaxone in the ED, transition to orals tomorrow  - scheduled duonebs  - given solumedrol 125mg, continue prednisone 50 mg daily  - start mucinex BID  - smoking cessation counseling provided    Acute on chronic combined systolic and diastolic heart  failure  AICD    - CXR with pulmonary edema  - given lasix 40 IV in ED  - BNP 12, low in setting of obesity  -strict Is/Os, daily weights  - no recent echo on file  - continue amiodarone 200mg daily  - continue metoprolol 50mg BID  - continue spironolactone 25mg daily      VTE Risk Mitigation (From admission, onward)        Ordered     enoxaparin injection 40 mg  Daily      04/24/19 0053     IP VTE HIGH RISK PATIENT  Once      04/24/19 0053             MARSHA AlasC  Department of Hospital Medicine   [facility]

## 2019-04-24 NOTE — ED TRIAGE NOTES
"Patient sent from  for fluid in L lung. SOB x"weeks" which has worsened today. Patient unable to ambulate secondary to sob. Patient reports productive cough with clear sputum x3 weeks. Pt has hx copd, emphysema, chf. Pt reports taking spironolactone. Patient sating 97% on ra.  "

## 2019-04-24 NOTE — PROGRESS NOTES
Subjective:       Patient ID: Facundo Lord Jr. is a 63 y.o. male.    Vitals:  height is 6' (1.829 m) and weight is 160.1 kg (353 lb) (abnormal). His oral temperature is 98.1 °F (36.7 °C). His blood pressure is 133/77 and his pulse is 94. His oxygen saturation is 96%.     Chief Complaint: URI    URI    This is a new problem. The current episode started in the past 7 days (2 days). The problem has been gradually worsening. There has been no fever. Associated symptoms include congestion, coughing, headaches and wheezing. Pertinent negatives include no ear pain, nausea, rash, sinus pain, sore throat or vomiting. Treatments tried: neb tx. The treatment provided no relief.   Pt has SOB that is worse with exertion for few days, associated with wheezing.  No chest pain.    Constitution: Positive for fatigue. Negative for chills, sweating and fever.   HENT: Positive for congestion. Negative for ear pain, sinus pain, sinus pressure, sore throat and voice change.    Neck: Negative for painful lymph nodes.   Eyes: Negative for eye redness.   Respiratory: Positive for chest tightness, cough, sputum production, shortness of breath and wheezing. Negative for bloody sputum, COPD, stridor and asthma.    Gastrointestinal: Negative for nausea and vomiting.   Musculoskeletal: Negative for muscle ache.   Skin: Negative for rash and erythema.   Allergic/Immunologic: Negative for seasonal allergies and asthma.   Neurological: Positive for headaches.   Hematologic/Lymphatic: Negative for swollen lymph nodes.       Objective:      Physical Exam   Constitutional: He is oriented to person, place, and time. He appears well-developed and well-nourished.   HENT:   Head: Normocephalic and atraumatic.   Eyes: Pupils are equal, round, and reactive to light. EOM are normal.   Neck: Normal range of motion. Neck supple.   Cardiovascular: Normal rate, regular rhythm and normal heart sounds. Exam reveals no gallop and no friction rub.   No murmur  heard.  Pulmonary/Chest: He has no wheezes. He has no rales. He exhibits no tenderness.   Reduce air movement in both lung field.  No wheezing heard at this time.   Abdominal: Soft. Bowel sounds are normal. He exhibits no distension. There is no tenderness.   Musculoskeletal: Normal range of motion. He exhibits no edema, tenderness or deformity.   Lymphadenopathy:     He has no cervical adenopathy.   Neurological: He is alert and oriented to person, place, and time. He displays normal reflexes. No cranial nerve deficit. He exhibits normal muscle tone. Coordination normal.   Skin: Skin is warm. Capillary refill takes less than 2 seconds. No rash noted. No erythema. No pallor.   Psychiatric: He has a normal mood and affect. His behavior is normal. Judgment and thought content normal.   Vitals reviewed.      Assessment:       1. Pleural effusion    2. Cough    3. SOB (shortness of breath)        Plan:         Pleural effusion  -     Refer to Emergency Dept.    Cough  -     X-Ray Chest PA And Lateral; Future; Expected date: 04/23/2019    SOB (shortness of breath)  -     Refer to Emergency Dept.          Patient Instructions   Pt wife will take patient to [facility] ER for further evaluation of SOB, Pleural effusion.    Rj Yo MD

## 2019-04-24 NOTE — ED NOTES
Adult Physical Assessment  LOC: Facundo Lord Jr., 63 y.o. male verified via two identifiers.  The patient is awake, alert, oriented and speaking appropriately at this time.  APPEARANCE: Patient resting comfortably and appears to be in no acute distress at this time. Patient is clean and well groomed, patient's clothing is properly fastened.  SKIN:The skin is warm and dry, color consistent with ethnicity, patient has normal skin turgor and moist mucus membranes, skin intact, no breakdown or brusing noted.  MUSCULOSKELETAL: Patient moving all extremities well, no obvious swelling or deformities noted.  RESPIRATORY: Airway is open and patent, respirations are spontaneous, patient has a normal effort and rate, no accessory muscle use noted. SOB noted on exertion.  CARDIAC: Patient has a normal rate and rhythm, no periphreal edema noted in any extremity, capillary refill < 3 seconds in all extremities. Pt with pacemaker and defibrillator   ABDOMEN: Soft and non tender to palpation, no abdominal distention noted. Bowel sounds present in all four quadrants.  NEUROLOGIC: Eyes open spontaneously, behavior appropriate to situation, follows commands, facial expression symmetrical, bilateral hand grasp equal and even, purposeful motor response noted, normal sensation in all extremities when touched with a finger.

## 2019-04-24 NOTE — SUBJECTIVE & OBJECTIVE
Past Medical History:   Diagnosis Date    AICD (automatic cardioverter/defibrillator) present     Anticoagulant long-term use     Cancer     CHF (congestive heart failure)     COPD (chronic obstructive pulmonary disease)     Coronary artery disease     Emphysema of lung     Hypertension     Rheumatoid arthritis        Past Surgical History:   Procedure Laterality Date    anal fistula excision  08/2018    ARTHROPLASTY, HIP Right 11/26/2018    [facility]    bladder cancer surgeruy Bilateral     CARDIAC DEFIBRILLATOR PLACEMENT      excision mass buttocks  05/04/2018    EXCISION-MASS-BUTTOCKS Left 5/4/2018    Performed by [facility]it [facility] at [facility]    JOINT REPLACEMENT      pace maker      defibrillator    RELEASE, TENDON, FLEXOR, HIP, OR HIP ADDUCTOR TENOTOMY Right 11/26/2018    [facility]    [facility]         Review of patient's allergies indicates:   Allergen Reactions    Lisinopril Rash       No current facility-administered medications on file prior to encounter.      Current Outpatient Medications on File Prior to Encounter   Medication Sig    amiodarone (PACERONE) 200 MG Tab Take by mouth once daily.    aspirin (ECOTRIN) 81 MG EC tablet Take 81 mg by mouth once daily.    atorvastatin (LIPITOR) 40 MG tablet Take 40 mg by mouth once daily.    clopidogrel (PLAVIX) 75 mg tablet Take 75 mg by mouth once daily.    folic acid (FOLVITE) 1 MG tablet Take 1 mg by mouth once daily.    losartan (COZAAR) 50 MG tablet Take 50 mg by mouth once daily.    metoprolol succinate (TOPROL-XL) 50 MG 24 hr tablet Take 50 mg by mouth 2 (two) times daily.     pentoxifylline (TRENTAL) 400 mg TbSR Take 400 mg by mouth 3 (three) times daily with meals.    potassium chloride (MICRO-K) 10 MEQ CpSR Take 20 mEq by mouth once.     spironolactone (ALDACTONE) 25 MG tablet Take 25 mg by mouth once daily.    buPROPion (WELLBUTRIN SR) 150 MG TBSR 12 hr  tablet Take 1 tablet (150 mg total) by mouth 2 (two) times daily. (Patient taking differently: Take 150 mg by mouth once daily. )     Family History     None        Tobacco Use    Smoking status: Current Every Day Smoker     Packs/day: 0.50     Start date: 1972    Smokeless tobacco: Never Used   Substance and Sexual Activity    Alcohol use: Yes     Comment: Seldom    Drug use: Not on file    Sexual activity: Not on file     Review of Systems   Constitutional: Positive for activity change. Negative for chills and fever.   HENT: Negative for trouble swallowing.    Eyes: Negative for photophobia and visual disturbance.   Respiratory: Positive for cough, chest tightness, shortness of breath and wheezing.    Cardiovascular: Negative for chest pain, palpitations and leg swelling.   Gastrointestinal: Negative for abdominal distention, constipation, diarrhea, nausea and vomiting.   Genitourinary: Negative for dysuria, frequency and hematuria.   Musculoskeletal: Negative for back pain, gait problem and neck stiffness.   Skin: Negative for rash and wound.   Neurological: Negative for dizziness, speech difficulty, weakness and light-headedness.   Psychiatric/Behavioral: Negative for agitation, confusion and suicidal ideas. The patient is not nervous/anxious.      Objective:     Vital Signs (Most Recent):  Temp: 98.6 °F (37 °C) (04/23/19 2112)  Pulse: 69 (04/24/19 0259)  Resp: 18 (04/24/19 0259)  BP: (!) 144/64 (04/24/19 0232)  SpO2: (!) 94 % (04/24/19 0259) Vital Signs (24h Range):  Temp:  [98.1 °F (36.7 °C)-98.6 °F (37 °C)] 98.6 °F (37 °C)  Pulse:  [] 69  Resp:  [13-24] 18  SpO2:  [93 %-97 %] 94 %  BP: (115-154)/(60-89) 144/64     Weight: (!) 162.3 kg (357 lb 12.9 oz)  Body mass index is 48.53 kg/m².    Physical Exam   Constitutional: He is oriented to person, place, and time. He appears well-developed and well-nourished. No distress.   HENT:   Head: Normocephalic and atraumatic.   Mouth/Throat: No oropharyngeal  exudate.   Eyes: Pupils are equal, round, and reactive to light. EOM are normal. No scleral icterus.   Neck: Normal range of motion. Neck supple.   Cardiovascular: Normal rate and regular rhythm.   Murmur heard.  Pulmonary/Chest: Accessory muscle usage present. Tachypnea noted. He has wheezes. He has rales in the left middle field and the left lower field.   Abdominal: Soft. Bowel sounds are normal. He exhibits no distension. There is no tenderness.   Musculoskeletal: Normal range of motion. He exhibits edema (trace BLE). He exhibits no tenderness.   Lymphadenopathy:     He has no cervical adenopathy.   Neurological: He is alert and oriented to person, place, and time. No cranial nerve deficit or sensory deficit.   Skin: Skin is warm and dry. No rash noted.   Psychiatric: He has a normal mood and affect. His behavior is normal. Thought content normal.   Nursing note and vitals reviewed.        CRANIAL NERVES     CN III, IV, VI   Pupils are equal, round, and reactive to light.  Extraocular motions are normal.        Significant Labs:   BMP:   Recent Labs   Lab 04/23/19  2212      *   K 4.7      CO2 20*   BUN 18   CREATININE 1.1   CALCIUM 9.2     CBC:   Recent Labs   Lab 04/23/19  2212   WBC 11.19   HGB 14.6   HCT 44.7        Urine Studies: No results for input(s): COLORU, APPEARANCEUA, PHUR, SPECGRAV, PROTEINUA, GLUCUA, KETONESU, BILIRUBINUA, OCCULTUA, NITRITE, UROBILINOGEN, LEUKOCYTESUR, RBCUA, WBCUA, BACTERIA, SQUAMEPITHEL, HYALINECASTS in the last 48 hours.    Invalid input(s): RAFATSUJOLEEN    Significant Imaging: I have reviewed all pertinent imaging results/findings within the past 24 hours.

## 2019-04-24 NOTE — ASSESSMENT & PLAN NOTE
CAP    Patient presents with 3 weeks of worsening productive cough, LECHUGA, and shortness of breath. Patient desaturaing to 80s on room air. Patient reports continued cigarette smoking, 1 pack lasts him 3 days.  - CXR with worsening LLL consolidation  - given azithromycin 500mg + 2g ceftriaxone in the ED, transition to orals tomorrow  - scheduled duonebs  - given solumedrol 125mg, continue prednisone 50 mg daily  - start mucinex BID  - smoking cessation counseling provided

## 2019-04-24 NOTE — DISCHARGE SUMMARY
Discharge Summary  Hospital Medicine       Name: Facundo Lord Jr.  YOB: 1955 (Age: 63 y.o.)  Date of Admission: 4/23/2019  Date of Discharge: 4/24/2019  Attending Provider on Discharge: Angelic Clay MD  [facility] Medicine Team: Networked reference to record PCT   Code status: Full Code    Primary Care Provider: Sanjay Araujo MD    Discharge Diagnosis:  Active Hospital Problems    Diagnosis  POA    *Acute exacerbation of chronic obstructive pulmonary disease (COPD) [J44.1]  Yes    Acute on chronic combined systolic and diastolic heart failure [I50.43]  Yes    AICD (automatic cardioverter/defibrillator) present [Z95.810]  Yes    CAP (community acquired pneumonia) [J18.9]  Yes    Acute hypoxemic respiratory failure [J96.01]  Yes    Cough [R05]  Yes    HTN (hypertension) [I10]  Yes      Resolved Hospital Problems   No resolved problems to display.       Per HPI:  Patient is a 62yo male with a PMHx of COPD, HFrEF w/ AICD, HTN being admitted to medicine for acute COPD exacerbation. He presented to ED as a referral from urgent care this morning where he presented c/o 3 weeks worsening cough productive of clear sputum and 4 days worsening LECHUGA. He denies fevers, chills, chest pain, nightsweats, nausea, and vomiting, f/c. He denies recent sick contacts. His CXR at urgent care demonstrated fuad-hilar pulmonary edema with associated LLL consolidation/opacification concerning for asymmetric edema vs developing PNA. Patient states he has had similar episodes of worsening SoB in the past that turned out to be COPD exacerbations.   In the ED, patient hypoxic to 93% on room air, worse with ambulation. He was given duonebs, solumedrol, and antibiotics and admitted to medicine for further management.     Interval hx:   Pt was seen and examined at bedside. Pt had no acute events overnight, and no new complaints this morning. Pt remained hemodynamically stable and afebrile. Pt was weaned off O2. Sob  greatly improved. Pt still has a mild cough with clear sputum production but is feeling back to baseline.  Pt has been tolerating PO intake well without any nausea, vomiting, diarrhea, constipation, blood in stools or trouble urinating. Pt denies any fevers, chills, malaise, headaches, chest pain. Pt has been able to ambulate without any distress. Pt is very eager to go home today.     Hospital Course:  Acute exacerbation of chronic obstructive pulmonary disease (COPD)  CAP  -Patient presents with 3 weeks of worsening productive cough, LECHUGA, and shortness of breath. Patient desaturaing to 80s on room air. Patient reports continued cigarette smoking, 1 pack lasts him 3 days.  -CXR with worsening LLL consolidation  -given azithromycin 500mg + 2g ceftriaxone in the ED, transition to levaquin PO today (QTc wnl) to finish 7 day course of antibiptiics  -duonebs prescribed  -given solumedrol 125mg in the ED, continue prednisone 40 mg daily for 4 days     Acute on chronic combined systolic and diastolic heart failure  AICD  -Continue symptomatic management by diuresis with lasix 40mg po daily   -Cont asa 81mg, plavix 75mg daily, losartan 50mg, metoprolol succinate 50mg BID (home dose), statin, spironolactone 25mg daily  -continue home amiodarone   -Monitor intake and output and obtain daily STANDING weights  -Fluid restriction to 1.5L per day and cardiac diet with salt restriction  -f/u with cardiology in 2-4 weeks (reports he already has an appointment set up)     Hyperlipidemia:  -continue PTA atorvastatin 40mg      Hypertension:  -stable  -Continue PTA losartan 50mg, metoprolol succinate 50mg BID (home dose), spironolactone 25mg daily     PAD:  -continue asa, plavix, pentoxifylline  -continue statin      Coronary Artery Disease:  -Continue home ASA, plavix, and statin     Smoking Cessation:  -Pt currently smoking 1 pack lasts him 3 days  -referred to tobacco cessation counseling   -continue Wellbutrin   -nicotine  patch     Depression:  -continue wellbutrin     Left dorsal foot wound:  -apply border foam PRN over healed blister to left foot.  -Continue pressure prevention interventions as directed.    Labs:  Recent Labs   Lab 04/23/19 2212   WBC 11.19   HGB 14.6   HCT 44.7        Recent Labs   Lab 04/23/19 2212   *   K 4.7      CO2 20*   BUN 18   CREATININE 1.1      CALCIUM 9.2     Recent Labs   Lab 04/23/19 2212   ALKPHOS 102   ALT 10   AST 18   ALBUMIN 3.3*   PROT 7.4   BILITOT 0.7      No results for input(s): POCTGLUCOSE in the last 168 hours.  Recent Labs     04/23/19 2212   TROPONINI <0.006       ROS (Positive in Bold, otherwise negative)  Constitutional: fever, chills, night sweats  CV: chest pain, edema, palpitations  Resp: SOB, cough, sputum production  GI: changes in appetite, NVDC, pain, melena, hematochezia, GERD, hematemesis  : Dysuria, hematuria, urinary urgency, frequency  MSK: arthralgia/myalgia, joint swelling  Neuro/Psych: anxiety, depression    PEx   Temp:  [97.6 °F (36.4 °C)-98.6 °F (37 °C)]   Pulse:  []   Resp:  [13-26]   BP: (115-154)/(60-97)   SpO2:  [92 %-97 %]      General: no distress   Lungs: clear to ausculation anteriorly and posteriorly   Heart: regular rate and rhythm   Abdomen: normal bowel sounds, soft, no tenderness   Extremities: no edema. No clubbing or cyanosis     Procedures: CXR: Cardiomegaly with suspected pulmonary edema. Asymmetric opacification/consolidation is seen within the left lower lung zone which could reflect either asymmetric edema versus superimposed aspiration or developing pneumonia in the right clinical   setting.    Consultants: wound care    Current Discharge Medication List      START taking these medications    Details   albuterol-ipratropium (DUO-NEB) 2.5 mg-0.5 mg/3 mL nebulizer solution Take 3 mLs by nebulization every 6 (six) hours as needed for Wheezing. Rescue  Qty: 1 Box, Refills: 0      benzonatate (TESSALON) 100 MG  capsule Take 1 capsule (100 mg total) by mouth 3 (three) times daily as needed for Cough.  Qty: 60 capsule, Refills: 0      furosemide (LASIX) 40 MG tablet Take 1 tablet (40 mg total) by mouth 2 (two) times daily.  Qty: 30 tablet, Refills: 1      levoFLOXacin (LEVAQUIN) 750 MG tablet Take 1 tablet (750 mg total) by mouth once daily.  Qty: 5 tablet, Refills: 0      nicotine (NICODERM CQ) 14 mg/24 hr Place 1 patch onto the skin once daily.  Refills: 0      predniSONE (DELTASONE) 20 MG tablet Take 2 tablets (40 mg total) by mouth once daily. for 3 days  Qty: 6 tablet, Refills: 0         CONTINUE these medications which have NOT CHANGED    Details   amiodarone (PACERONE) 200 MG Tab Take by mouth once daily.      aspirin (ECOTRIN) 81 MG EC tablet Take 81 mg by mouth once daily.      atorvastatin (LIPITOR) 40 MG tablet Take 40 mg by mouth once daily.      clopidogrel (PLAVIX) 75 mg tablet Take 75 mg by mouth once daily.      folic acid (FOLVITE) 1 MG tablet Take 1 mg by mouth once daily.      losartan (COZAAR) 50 MG tablet Take 50 mg by mouth once daily.      metoprolol succinate (TOPROL-XL) 50 MG 24 hr tablet Take 50 mg by mouth 2 (two) times daily.       pentoxifylline (TRENTAL) 400 mg TbSR Take 400 mg by mouth 3 (three) times daily with meals.      potassium chloride (MICRO-K) 10 MEQ CpSR Take 20 mEq by mouth once.       spironolactone (ALDACTONE) 25 MG tablet Take 25 mg by mouth once daily.      buPROPion (WELLBUTRIN SR) 150 MG TBSR 12 hr tablet Take 1 tablet (150 mg total) by mouth 2 (two) times daily.  Qty: 60 tablet, Refills: 0    Comments: USscripts ID USscripts ID [id number]  Associated Diagnoses: Moderate cigarette smoker (10-19 per day)         STOP taking these medications       aspirin 325 MG tablet Comments:   Reason for Stopping:               The relevant and important risks, side effects, and benefits of their medications were reviewed with patient during hospitalization and at discharge. The patient was  given the opportunity to discuss and ask questions about their medications, including target symptoms, potential risks, side effects and benefits of their medications, as well as their expected prognosis if non-medication treatment options were chosen.  The patient expresses understanding of all these options and information and voluntarily consents to treatment.    Discharge Diet:cardiac diet with Fluid restriction 1500 per day    Activity: activity as tolerated    Discharge Condition: Fair    Disposition: Home or Self Care    Tests pending at the time of discharge: none      Time spent  on the discharge of the patient including review of hospital course with the patient. reviewing discharge medications and arranging follow-up care: 37 mins    Discharge examination Patient was seen and examined on the date of discharge and determined to be suitable for discharge.    Discharge plan and follow up:  It is critical that you make your follow-up appointment(s). If you are discharged on the weekend or after business hours, or if we are unable to schedule these appointments for you for any reason, you or a family member need to call during the next business day to schedule your appointment(s).    -Follow up with you PCP, Sanjay Araujo MD within 1-2 weeks as arranged with  and treatment team prior to discharge  -Take all medications as prescribed and listed above.  -Recommended Follow-up Tests: BMP in 2 weeks to monitor renal function on lasix    - Please return to ED or call your physician if you have:         1. Fevers > 101.5 unresponsive to tylenol.       2. Abdominal pain and/or distention       3. Intractable nausea, vomiting or diarrhea       4. Inability to tolerate adequate oral intake of food       5. Neurologic changes, chest pain or shortness of breath         Future Appointments   Date Time Provider Department Center   4/24/2019  3:15 PM ECHO, [facility] Rick harris with PCP, cardiology      Angelic Clay MD  Hospital Medicine Staff  [phone] pager

## 2019-04-24 NOTE — ASSESSMENT & PLAN NOTE
AICD    - CXR with pulmonary edema  - given lasix 40 IV in ED  - BNP 12, low in setting of obesity  -strict Is/Os, daily weights  - no recent echo on file  - continue amiodarone 200mg daily  - continue metoprolol 50mg BID  - continue spironolactone 25mg daily

## 2019-04-25 ENCOUNTER — PATIENT OUTREACH (OUTPATIENT)
Dept: ADMINISTRATIVE | Facility: CLINIC | Age: 64
End: 2019-04-25

## 2019-04-25 NOTE — PATIENT INSTRUCTIONS
COPD Flare    You have had a flare-up of your COPD.  COPD, or chronic obstructive pulmonary disease, is a common lung disease. It causes your airways to become irritated and narrower. This makes it harder for you to breathe. Emphysema and chronic bronchitis are both types of COPD. This is a chronic condition, which means you always have it. Sometimes it gets worse. When this happens, it is called a flare-up.  Symptoms of COPD  People with COPD may have symptoms most of the time. In a flare-up, your symptoms get worse. These symptoms may mean you are having a flare-up:  · Shortness of breath, shallow or rapid breathing, or wheezing that gets worse  · Lung infection  · Cough that gets worse  · More mucus, thicker mucus or mucus of a different color  · Tiredness, decreased energy, or trouble doing your usual activities  · Fever  · Chest tightness  · Your symptoms dont get better even when you use your usual medicines, inhalers, and nebulizer  · Trouble talking  · You feel confused  Causes of flare-ups  Unfortunately, a flare-up can happen even though you did everything right, and you followed your doctors instructions. Some causes of flare-ups are:  · Smoking or secondhand smoke  · Colds, the flu, or respiratory infections  · Air pollution  · Sudden change in the weather  · Dust, irritating chemicals, or strong fumes  · Not taking your medicines as prescribed  Home care  Here are some things you can do at home to treat a flare-up:  · Try not to panic. This makes it harder to breathe, and keeps you from doing the right things.  · Dont smoke or be around others who are smoking.  · Try to drink more fluids than usual during a flare-up, unless your doctor has told you not to because of heart and kidney problems. More fluids can help loosen the mucus.  · Use your inhalers and nebulizer, if you have one, as you have been told to.  · If you were given antibiotics, take them until they are used up or your doctor tells you  to stop. Its important to finish the antibiotics, even though you feel better. This will make sure the infection has cleared.  · If you were given prednisone or another steroid, finish it even if you feel better.  Preventing a flare-up  Even though flare-ups happen, the best way to treat one is to prevent it before it starts. Here are some pointers:  · Dont smoke or be around others who are smoking.  · Take your medicines as you have been told.  · Talk with your doctor about getting a flu shot every year. Also find out if you need a pneumonia shot.  · If there is a weather advisory warning to stay indoors, try to stay inside when possible.  · Try to eat healthy and get plenty of sleep.  · Try to avoid things that usually set you off, like dust, chemical fumes, hairsprays, or strong perfumes.  Follow-up care  Follow up with your healthcare provider, or as advised.  If a culture was done, you will be told if your treatment needs to be changed. You can call as directed for the results.  If X-rays were done, you will be notified of any new findings that may affect your care.  Call 911  Call 911 if any of these occur:  · You have trouble breathing  · You feel confused or its difficult to wake you up  · You faint or lose consciousness  · You have a rapid heart rate  · You have new pain in your chest, arm, shoulder, neck or upper back  When to seek medical advice  Call your healthcare provider right away if any of these occur:  · Wheezing or shortness of breath gets worse  · You need to use your inhalers more often than usual without relief  · Fever of 100.4°F (38ºC) or higher, or as directed by your healthcare provider  · Coughing up lots of dark-colored or bloody mucus (sputum)  · Chest pain with each breath  · You do not start to get better within 24 hours  · Swelling of your ankles gets worse  · Dizziness or weakness  Date Last Reviewed: 9/1/2016  © 0594-2628 [employer]. [address] [facility],  [place] [address]. All rights reserved. This information is not intended as a substitute for professional medical care. Always follow your healthcare professional's instructions.

## 2019-06-19 ENCOUNTER — CLINICAL SUPPORT (OUTPATIENT)
Dept: SMOKING CESSATION | Facility: CLINIC | Age: 64
End: 2019-06-19
Payer: COMMERCIAL

## 2019-06-19 DIAGNOSIS — F17.200 NICOTINE DEPENDENCE: Primary | ICD-10-CM

## 2019-06-19 PROCEDURE — 99406 PR TOBACCO USE CESSATION INTERMEDIATE 3-10 MINUTES: ICD-10-PCS | Mod: S$GLB,,,

## 2019-06-19 PROCEDURE — 99406 BEHAV CHNG SMOKING 3-10 MIN: CPT | Mod: S$GLB,,,

## 2019-06-19 NOTE — PROGRESS NOTES
Successful contact with patient regarding tobacco cessation quit #1. Pt states, he continue to smoke and he's not interested in the program at this time.  Pt informed of his benefit status and contact information to schedule an appointment if he need support. Will update the tobacco cessation smart form for 6 and 12 months on quit #1 and resolve the episode.

## 2019-12-02 ENCOUNTER — CLINICAL SUPPORT (OUTPATIENT)
Dept: SMOKING CESSATION | Facility: CLINIC | Age: 64
End: 2019-12-02
Payer: COMMERCIAL

## 2019-12-02 DIAGNOSIS — F17.210 LIGHT CIGARETTE SMOKER (1-9 CIGS/DAY): Primary | ICD-10-CM

## 2019-12-02 PROCEDURE — 99404 PREV MED CNSL INDIV APPRX 60: CPT | Mod: S$GLB,,,

## 2019-12-02 PROCEDURE — 99999 PR PBB SHADOW E&M-EST. PATIENT-LVL I: ICD-10-PCS | Mod: PBBFAC,,,

## 2019-12-02 PROCEDURE — 99404 PR PREVENT COUNSEL,INDIV,60 MIN: ICD-10-PCS | Mod: S$GLB,,,

## 2019-12-02 PROCEDURE — 99999 PR PBB SHADOW E&M-EST. PATIENT-LVL I: CPT | Mod: PBBFAC,,,

## 2019-12-02 RX ORDER — VARENICLINE TARTRATE 0.5 (11)-1
KIT ORAL
Qty: 53 TABLET | Refills: 0 | Status: SHIPPED | OUTPATIENT
Start: 2019-12-02 | End: 2019-12-27 | Stop reason: ALTCHOICE

## 2019-12-09 ENCOUNTER — CLINICAL SUPPORT (OUTPATIENT)
Dept: SMOKING CESSATION | Facility: CLINIC | Age: 64
End: 2019-12-09
Payer: COMMERCIAL

## 2019-12-09 DIAGNOSIS — F17.210 LIGHT CIGARETTE SMOKER (1-9 CIGS/DAY): Primary | ICD-10-CM

## 2019-12-09 PROCEDURE — 99402 PR PREVENT COUNSEL,INDIV,30 MIN: ICD-10-PCS | Mod: S$GLB,,,

## 2019-12-09 PROCEDURE — 99999 PR PBB SHADOW E&M-EST. PATIENT-LVL I: ICD-10-PCS | Mod: PBBFAC,,,

## 2019-12-09 PROCEDURE — 99999 PR PBB SHADOW E&M-EST. PATIENT-LVL I: CPT | Mod: PBBFAC,,,

## 2019-12-09 PROCEDURE — 99402 PREV MED CNSL INDIV APPRX 30: CPT | Mod: S$GLB,,,

## 2019-12-09 NOTE — PROGRESS NOTES
Individual Follow-Up Form    12/9/2019    Quit Date:     Clinical Status of Patient: Outpatient    Length of Service: 30 minutes    Continuing Medication: yes  Chantix    Other Medications:      Target Symptoms: Withdrawal and medication side effects. The following were  rated moderate (3) to severe (4) on TCRS:  · Moderate (3): none  · Severe (4): none    Comments: completion of TCRS (Tobacco Cessation Rating Scale) reviewed strategies, cues, and triggers. Introduced the negative impact of tobacco on health, the health advantages of discontinuing the use of tobacco, time line improved health changes after a quit, withdrawal issues to expect from nicotine and habit, and ways to achieve the goal of a quit. The patient was not able to attain a quit and discussed a new quit but a date was not set. The patient remains on the prescribed tobacco cessation medication regimen of 1 mg Chantix BID without any negative side effects at this time. The patient denies any abnormal behavioral or mental changes at this time. The patient will continue with group therapy sessions and medication monitoring by CTTS. Prescribed medication management will be by physician.     Diagnosis: F17.210    Next Visit: 1 week

## 2019-12-16 ENCOUNTER — CLINICAL SUPPORT (OUTPATIENT)
Dept: SMOKING CESSATION | Facility: CLINIC | Age: 64
End: 2019-12-16
Payer: COMMERCIAL

## 2019-12-16 DIAGNOSIS — F17.210 LIGHT CIGARETTE SMOKER (1-9 CIGS/DAY): Primary | ICD-10-CM

## 2019-12-16 PROCEDURE — 99406 BEHAV CHNG SMOKING 3-10 MIN: CPT | Mod: S$GLB,,,

## 2019-12-16 PROCEDURE — 99999 PR PBB SHADOW E&M-EST. PATIENT-LVL I: ICD-10-PCS | Mod: PBBFAC,,,

## 2019-12-16 PROCEDURE — 99999 PR PBB SHADOW E&M-EST. PATIENT-LVL I: CPT | Mod: PBBFAC,,,

## 2019-12-16 PROCEDURE — 99406 PR TOBACCO USE CESSATION INTERMEDIATE 3-10 MINUTES: ICD-10-PCS | Mod: S$GLB,,,

## 2019-12-27 ENCOUNTER — CLINICAL SUPPORT (OUTPATIENT)
Dept: SMOKING CESSATION | Facility: CLINIC | Age: 64
End: 2019-12-27
Payer: COMMERCIAL

## 2019-12-27 DIAGNOSIS — F17.210 LIGHT CIGARETTE SMOKER (1-9 CIGS/DAY): Primary | ICD-10-CM

## 2019-12-27 PROCEDURE — 99406 BEHAV CHNG SMOKING 3-10 MIN: CPT | Mod: S$GLB,,,

## 2019-12-27 PROCEDURE — 99406 PR TOBACCO USE CESSATION INTERMEDIATE 3-10 MINUTES: ICD-10-PCS | Mod: S$GLB,,,

## 2019-12-27 PROCEDURE — 99999 PR PBB SHADOW E&M-EST. PATIENT-LVL I: CPT | Mod: PBBFAC,,,

## 2019-12-27 PROCEDURE — 99999 PR PBB SHADOW E&M-EST. PATIENT-LVL I: ICD-10-PCS | Mod: PBBFAC,,,

## 2019-12-27 RX ORDER — VARENICLINE TARTRATE 1 MG/1
1 TABLET, FILM COATED ORAL 2 TIMES DAILY
Qty: 58 TABLET | Refills: 0 | Status: SHIPPED | OUTPATIENT
Start: 2019-12-27 | End: 2020-01-23 | Stop reason: SDUPTHER

## 2020-01-07 ENCOUNTER — CLINICAL SUPPORT (OUTPATIENT)
Dept: SMOKING CESSATION | Facility: CLINIC | Age: 65
End: 2020-01-07
Payer: COMMERCIAL

## 2020-01-07 DIAGNOSIS — F17.210 LIGHT CIGARETTE SMOKER (1-9 CIGS/DAY): Primary | ICD-10-CM

## 2020-01-07 PROCEDURE — 99407 PR TOBACCO USE CESSATION INTENSIVE >10 MINUTES: ICD-10-PCS | Mod: S$GLB,,,

## 2020-01-07 PROCEDURE — 99407 BEHAV CHNG SMOKING > 10 MIN: CPT | Mod: S$GLB,,,

## 2020-01-23 ENCOUNTER — CLINICAL SUPPORT (OUTPATIENT)
Dept: SMOKING CESSATION | Facility: CLINIC | Age: 65
End: 2020-01-23
Payer: COMMERCIAL

## 2020-01-23 DIAGNOSIS — F17.210 LIGHT CIGARETTE SMOKER (1-9 CIGS/DAY): ICD-10-CM

## 2020-01-23 PROCEDURE — 99402 PREV MED CNSL INDIV APPRX 30: CPT | Mod: S$GLB,,,

## 2020-01-23 PROCEDURE — 99402 PR PREVENT COUNSEL,INDIV,30 MIN: ICD-10-PCS | Mod: S$GLB,,,

## 2020-01-23 RX ORDER — VARENICLINE TARTRATE 1 MG/1
1 TABLET, FILM COATED ORAL 2 TIMES DAILY
Qty: 58 TABLET | Refills: 0 | Status: SHIPPED | OUTPATIENT
Start: 2020-01-23 | End: 2020-02-19 | Stop reason: SDUPTHER

## 2020-01-23 NOTE — PROGRESS NOTES
Individual Follow-Up Form    1/23/2020    Quit Date: 1/20/2020    Clinical Status of Patient: Outpatient    Length of Service: 30 minutes    Continuing Medication: yes  Chantix    Other Medications:      Target Symptoms: Withdrawal and medication side effects. The following were  rated moderate (3) to severe (4) on TCRS:  · Moderate (3): none  · Severe (4): none    Comments: Patient called about missed session on 1/21/2020 and discussed the following. completion of TCRS (Tobacco Cessation Rating Scale) reviewed strategies, habitual behavior, high risks situations, understanding urges and cravings, stress and relaxation with open discussion and additional interventions, Introduced lapses, relapses, understanding them and analyzing the situation of a lapse, conflict issues that may be linked to a lapse. The patient is tobacco free as of 1/20/2020 and is not experiencing any negative side effects at this time. The patient remains on the prescribed tobacco cessation medication regimen of 1 mg Chantix BID without any negative side effects at this time. The patient denies any abnormal behavioral or mental changes at this time. The patient will continue with group therapy sessions and medication monitoring by CTTS. Prescribed medication management will be by physician.     Diagnosis: F17.210    Next Visit: 2 weeks

## 2020-01-29 ENCOUNTER — TELEPHONE (OUTPATIENT)
Dept: SMOKING CESSATION | Facility: CLINIC | Age: 65
End: 2020-01-29

## 2020-01-29 NOTE — TELEPHONE ENCOUNTER
Left voicemail about cancelled appointment. Number given to reschedule   Excision Depth: adipose tissue

## 2020-02-11 ENCOUNTER — CLINICAL SUPPORT (OUTPATIENT)
Dept: SMOKING CESSATION | Facility: CLINIC | Age: 65
End: 2020-02-11
Payer: COMMERCIAL

## 2020-02-11 DIAGNOSIS — F17.210 LIGHT CIGARETTE SMOKER (1-9 CIGS/DAY): Primary | ICD-10-CM

## 2020-02-11 PROCEDURE — 99999 PR PBB SHADOW E&M-EST. PATIENT-LVL I: CPT | Mod: PBBFAC,,,

## 2020-02-11 PROCEDURE — 99402 PREV MED CNSL INDIV APPRX 30: CPT | Mod: S$GLB,,,

## 2020-02-11 PROCEDURE — 99999 PR PBB SHADOW E&M-EST. PATIENT-LVL I: ICD-10-PCS | Mod: PBBFAC,,,

## 2020-02-11 PROCEDURE — 99402 PR PREVENT COUNSEL,INDIV,30 MIN: ICD-10-PCS | Mod: S$GLB,,,

## 2020-02-11 NOTE — Clinical Note
Just a note to advise how the patient is progressing in the tobacco cessation program. The patient was able to stay tobacco free for 5 days and lapsed due to boredom. Reviewed with the patient the differences between tobacco withdrawal and habit withdrawal. Pointing out how boredom is a habit issue. Additional modifications were discussed and the patient set a new quit date for 2/14/2020. The patient remains on the prescribed tobacco cessation medication regimen of 1 mg Chantix BID without any negative side effects at this time.

## 2020-02-12 NOTE — PROGRESS NOTES
Individual Follow-Up Form    2/11/2020    Quit Date:     Clinical Status of Patient: Outpatient    Length of Service: 30 minutes    Continuing Medication: yes  Chantix    Other Medications:      Target Symptoms: Withdrawal and medication side effects. The following were  rated moderate (3) to severe (4) on TCRS:  · Moderate (3): none  · Severe (4): none    Comments: completion of TCRS (Tobacco Cessation Rating Scale) reviewed strategies, habitual behavior, high risks situations, understanding urges and cravings, stress and relaxation with open discussion and additional interventions, Introduced lapses, relapses, understanding them and analyzing the situation of a lapse, conflict issues that may be linked to a lapse. The patient was able to stay tobacco free for 5 days and lapsed due to boredom. Reviewed with the patient the differences between tobacco withdrawal and habit withdrawal. Pointing out how boredom is a habit issue. Additional modifications were discussed and the patient set a new quit date for 2/14/2020. The patient remains on the prescribed tobacco cessation medication regimen of 1 mg Chantix BID without any negative side effects at this time. The patient denies any abnormal behavioral or mental changes at this time. The patient will continue with group therapy sessions and medication monitoring by CTTS. Prescribed medication management will be by physician.     Diagnosis: F17.210    Next Visit: 1 week

## 2020-02-19 ENCOUNTER — CLINICAL SUPPORT (OUTPATIENT)
Dept: SMOKING CESSATION | Facility: CLINIC | Age: 65
End: 2020-02-19
Payer: COMMERCIAL

## 2020-02-19 DIAGNOSIS — F17.210 LIGHT CIGARETTE SMOKER (1-9 CIGS/DAY): ICD-10-CM

## 2020-02-19 DIAGNOSIS — F17.210 CIGARETTE NICOTINE DEPENDENCE, UNCOMPLICATED: Primary | ICD-10-CM

## 2020-02-19 PROCEDURE — 99402 PR PREVENT COUNSEL,INDIV,30 MIN: ICD-10-PCS | Mod: S$GLB,,,

## 2020-02-19 PROCEDURE — 99402 PREV MED CNSL INDIV APPRX 30: CPT | Mod: S$GLB,,,

## 2020-02-19 RX ORDER — VARENICLINE TARTRATE 1 MG/1
1 TABLET, FILM COATED ORAL 2 TIMES DAILY
Qty: 58 TABLET | Refills: 0 | Status: SHIPPED | OUTPATIENT
Start: 2020-02-19 | End: 2020-03-26 | Stop reason: SDUPTHER

## 2020-02-19 NOTE — PROGRESS NOTES
Individual Follow-Up Form    2/19/2020    Quit Date: 2/13/2020     Clinical Status of Patient: Outpatient    Length of Service: 30 minutes    Continuing Medication: yes  Chantix    Other Medications:      Target Symptoms: Withdrawal and medication side effects. The following were  rated moderate (3) to severe (4) on TCRS:  · Moderate (3): none  · Severe (4): none    Comments: Called patient about missed session on 2/18/2020. Reviewed strategies, habitual behavior, high risks situations, understanding urges and cravings, stress and relaxation with open discussion and additional interventions, Introduced lapses, relapses, understanding them and analyzing the situation of a lapse, conflict issues that may be linked to a lapse. The patient is tobacco free as of 2/13/2020 and is struggling with urges. In reviewing the situations with the patient; they are habit issues and not nicotine. Reviewed and introduced additional habit notification techniques to help the patient remain tobacco free. The patient remains on the prescribed tobacco cessation medication regimen of 1 mg Chantix BID without any negative side effects at this time. The patient denies any abnormal behavioral or mental changes at this time. The patient will continue with group therapy sessions and medication monitoring by CTTS. Prescribed medication management will be by physician.     Diagnosis: F17.210    Next Visit: 2 weeks

## 2020-03-05 ENCOUNTER — OFFICE VISIT (OUTPATIENT)
Dept: OPTOMETRY | Facility: CLINIC | Age: 65
End: 2020-03-05
Payer: COMMERCIAL

## 2020-03-05 DIAGNOSIS — H52.4 HYPEROPIA WITH ASTIGMATISM AND PRESBYOPIA, RIGHT: Primary | ICD-10-CM

## 2020-03-05 DIAGNOSIS — H25.043 POSTERIOR SUBCAPSULAR POLAR AGE-RELATED CATARACT OF BOTH EYES: ICD-10-CM

## 2020-03-05 DIAGNOSIS — H52.12 MYOPIA WITH PRESBYOPIA OF LEFT EYE: ICD-10-CM

## 2020-03-05 DIAGNOSIS — H52.201 HYPEROPIA WITH ASTIGMATISM AND PRESBYOPIA, RIGHT: Primary | ICD-10-CM

## 2020-03-05 DIAGNOSIS — H25.13 NUCLEAR SCLEROSIS OF BOTH EYES: ICD-10-CM

## 2020-03-05 DIAGNOSIS — H52.4 MYOPIA WITH PRESBYOPIA OF LEFT EYE: ICD-10-CM

## 2020-03-05 DIAGNOSIS — H52.01 HYPEROPIA WITH ASTIGMATISM AND PRESBYOPIA, RIGHT: Primary | ICD-10-CM

## 2020-03-05 PROCEDURE — 92015 PR REFRACTION: ICD-10-PCS | Mod: S$GLB,,, | Performed by: OPTOMETRIST

## 2020-03-05 PROCEDURE — 99999 PR PBB SHADOW E&M-EST. PATIENT-LVL III: CPT | Mod: PBBFAC,,, | Performed by: OPTOMETRIST

## 2020-03-05 PROCEDURE — 92015 DETERMINE REFRACTIVE STATE: CPT | Mod: S$GLB,,, | Performed by: OPTOMETRIST

## 2020-03-05 PROCEDURE — 92004 PR EYE EXAM, NEW PATIENT,COMPREHESV: ICD-10-PCS | Mod: S$GLB,,, | Performed by: OPTOMETRIST

## 2020-03-05 PROCEDURE — 92004 COMPRE OPH EXAM NEW PT 1/>: CPT | Mod: S$GLB,,, | Performed by: OPTOMETRIST

## 2020-03-05 PROCEDURE — 99999 PR PBB SHADOW E&M-EST. PATIENT-LVL III: ICD-10-PCS | Mod: PBBFAC,,, | Performed by: OPTOMETRIST

## 2020-03-05 NOTE — PROGRESS NOTES
"HPI     Pt referred by self  Pt presents blurry vision in OS X 9 month. " I cannot drive at Night   because of the lights coming at me"  Pt wearing OTC +3.25    (-) itching/burning/tearing (-) double vision/eye pain    Last edited by Jameel Kwon, OD on 3/5/2020  9:33 AM. (History)            Assessment /Plan     For exam results, see Encounter Report.    Hyperopia with astigmatism and presbyopia, right    Myopia with presbyopia of left eye    Nuclear sclerosis of both eyes    Posterior subcapsular polar age-related cataract of both eyes      1-2. Hold SRx.   3-4. Visually significant OD<OS. Educated pt on potential for surgery w/understanding. Refer to Dr Hutson.     RTC 1 year routine.                  "

## 2020-03-09 ENCOUNTER — TELEPHONE (OUTPATIENT)
Dept: OPTOMETRY | Facility: CLINIC | Age: 65
End: 2020-03-09

## 2020-03-10 ENCOUNTER — TELEPHONE (OUTPATIENT)
Dept: SMOKING CESSATION | Facility: CLINIC | Age: 65
End: 2020-03-10

## 2020-03-10 NOTE — TELEPHONE ENCOUNTER
Patient is tobacco free and just forgot about his appointment. No appointments available for the next 3 weeks.

## 2020-03-18 ENCOUNTER — TELEPHONE (OUTPATIENT)
Dept: OPHTHALMOLOGY | Facility: CLINIC | Age: 65
End: 2020-03-18

## 2020-03-18 NOTE — TELEPHONE ENCOUNTER
----- Message from Stephanie Gray sent at 3/18/2020  3:33 PM CDT -----  Contact: PT   PT is calling to reschedule his appointment on 3/23     Callback: [phone]

## 2020-03-26 ENCOUNTER — CLINICAL SUPPORT (OUTPATIENT)
Dept: SMOKING CESSATION | Facility: CLINIC | Age: 65
End: 2020-03-26
Payer: COMMERCIAL

## 2020-03-26 DIAGNOSIS — F17.210 CIGARETTE NICOTINE DEPENDENCE, UNCOMPLICATED: Primary | ICD-10-CM

## 2020-03-26 DIAGNOSIS — F17.210 LIGHT CIGARETTE SMOKER (1-9 CIGS/DAY): ICD-10-CM

## 2020-03-26 PROCEDURE — 99402 PREV MED CNSL INDIV APPRX 30: CPT | Mod: S$GLB,,,

## 2020-03-26 PROCEDURE — 99402 PR PREVENT COUNSEL,INDIV,30 MIN: ICD-10-PCS | Mod: S$GLB,,,

## 2020-03-26 RX ORDER — VARENICLINE TARTRATE 1 MG/1
1 TABLET, FILM COATED ORAL 2 TIMES DAILY
Qty: 58 TABLET | Refills: 0 | Status: SHIPPED | OUTPATIENT
Start: 2020-03-26 | End: 2020-07-02 | Stop reason: ALTCHOICE

## 2020-03-26 NOTE — PROGRESS NOTES
Individual Follow-Up Form    3/26/2020    Quit Date: 1/18/2020    Clinical Status of Patient: Outpatient    Length of Service: 30 minutes    Continuing Medication: yes  Chantix    Other Medications:      Target Symptoms: Withdrawal and medication side effects. The following were  rated moderate (3) to severe (4) on TCRS:  · Moderate (3): none  · Severe (4): none    Comments: Due to COVID-19 session done by phone. completion of TCRS (Tobacco Cessation Rating Scale) reviewed strategies, habitual behavior, high risks situations, understanding urges and cravings, stress and relaxation with open discussion and additional interventions, Introduced lapses, relapses, understanding them and analyzing the situation of a lapse, conflict issues that may be linked to a lapse. The patient is tobacco free as of 3/18/2020 and is not experiencing any negative side effects from the quit. Patient stated he is having an increase in urges due to the stress of the COVID-19 and having a son who is at high risk. The patient remains on the prescribed tobacco cessation medication regimen of 1 mg Chantix BID without any negative side effects at this time. The patient denies any abnormal behavioral or mental changes at this time. The patient will continue with therapy sessions and medication monitoring by CTTS. Prescribed medication management will be by physician.     Diagnosis: F17.210    Next Visit: 1 week

## 2020-04-28 ENCOUNTER — OFFICE VISIT (OUTPATIENT)
Dept: PULMONOLOGY | Facility: CLINIC | Age: 65
End: 2020-04-28
Payer: MEDICARE

## 2020-04-28 ENCOUNTER — TELEPHONE (OUTPATIENT)
Dept: ENDOCRINOLOGY | Facility: CLINIC | Age: 65
End: 2020-04-28

## 2020-04-28 DIAGNOSIS — Z12.9 SCREENING FOR CANCER: ICD-10-CM

## 2020-04-28 DIAGNOSIS — J44.9 CHRONIC OBSTRUCTIVE PULMONARY DISEASE, UNSPECIFIED COPD TYPE: Primary | ICD-10-CM

## 2020-04-28 PROCEDURE — 99443 PR PHYSICIAN TELEPHONE EVALUATION 21-30 MIN: ICD-10-PCS | Mod: 95,,, | Performed by: NURSE PRACTITIONER

## 2020-04-28 PROCEDURE — 99443 PR PHYSICIAN TELEPHONE EVALUATION 21-30 MIN: CPT | Mod: 95,,, | Performed by: NURSE PRACTITIONER

## 2020-04-28 RX ORDER — IPRATROPIUM BROMIDE AND ALBUTEROL SULFATE 2.5; .5 MG/3ML; MG/3ML
3 SOLUTION RESPIRATORY (INHALATION) 2 TIMES DAILY PRN
Qty: 1 BOX | Refills: 1 | Status: SHIPPED | OUTPATIENT
Start: 2020-04-28

## 2020-04-28 NOTE — TELEPHONE ENCOUNTER
----- Message from Rita Toussaint sent at 4/28/2020 11:22 AM CDT -----  Contact: Dr. Rodríguez  [phone]  DR. Weems wife states she need to speak with Dr. Mckinnon please call back to discuss

## 2020-04-28 NOTE — PROGRESS NOTES
Established Patient - Audio Only Telehealth Visit     The patient location is: Home in [place]  The chief complaint leading to consultation is: COPD  Visit type: Virtual visit with audio only (telephone) 22 mins     The reason for the audio only service rather than synchronous audio and video virtual visit was related to technical difficulties or patient preference/necessity.     Each patient to whom I provide medical services by telemedicine is:  (1) informed of the relationship between the physician and patient and the respective role of any other health care provider with respect to management of the patient; and (2) notified that they may decline to receive medical services by telemedicine and may withdraw from such care at any time. Patient verbally consented to receive this service via voice-only telephone call.     HPI:     Mr. Lord is a 63 y/o M new patient presenting for audio visit in relation to COPD.  This audio visitation was conducted during the COVID-19 pandemic. Dwight has been diagnosed with COPD years ago in relation to an extensive smoking history.  Currently, he is not on any medication therapy.  States since being quarantined, has seen numerous commercials about treating COPD and decided to have a visitation with pulmonary to discuss. Explains having pulse ox at home and reads 97% and HR 60.     Patient  experiences chronic mMRC 3 symptoms of dyspnea. Tells dyspnea has been present for years. Notes to have mild chronic cough. Explains had 1 COPD exacerbations in the last one year; 1 hospitalizations for respiratory problems. He denies fever, chills, sore throat, N/V/D or any worsening symptom on dyspnea.  Explains has been self quarantined with wife and son.  Denies any positive sick contacts.  Denies any known COVID-19 exposure.    Dwight has recently quit smoking (3/18/2020) with help of Chantix. Disclose smoking from age of 13- 64 years old between 1-2 packs a day.     Assessment and plan:       COPD-  Reports hx of COPD.  Has extensive smoking history.  Has recently quit smoking(3/18/2020) with the help of Chantix.  Discussed with patient will be needing to have an office visitation with spirometry with and without, lung volumes, and DLCO.  Also, would need a pulmonary stress and non contrast CT of chest to rule out parenchymal lung disease. He reports having COPD and + smoking hx. Currently, he is not on any inhaler therapy. Experiences chronic mMRC symptoms of dyspnea and mild daily cough. He has neb machine at home. Will prescribe DuoNebs 2 times a day as he has taken in the past without issue.  Plan on having patient follow-up in clinic.  Discussed in detail with patient's signs and symptoms that would warrant emergency services.     Cancer screening-  Low Dose Screening CT Chest    Cigarettes -  pack years  Still smoking - No  QUIT     I have discussed with pt about using a screening CT of the chest due to history of cigarette smoking.  We have discussed the possible findings and possible actions as a result of these findings.  The pt would like to proceed.      Follow Up:  -Will have follow up in 6 weeks in office for testing. He will notify me via portal or telephone in 1-2 weeks to notify of condition.     This service was not originating from a related E/M service provided within the previous 7 days nor will  to an E/M service or procedure within the next 24 hours or my soonest available appointment.  Prevailing standard of care was able to be met in this audio-only visit.

## 2020-04-29 NOTE — PATIENT INSTRUCTIONS
What is COPD?  COPD stands for chronic obstructive pulmonary disease. It means the airways in your lungs are blocked (obstructed). Because of this, it is hard to breathe. You may have trouble with daily activities because of shortness of breath. Over time the shortness of breath usually worsens making it more and more difficult to take care of yourself and take part in activities. Chronic bronchitis and emphysema are two common types of COPD.  What happens in chronic bronchitis?    The cells in the airways make more mucus than normal. The mucus builds up, narrowing the airways. This means less air travels into and out of the lungs. The lining of the airways may also become inflamed (swollen) and causes the airways to narrow even more.        What happens in emphysema?    The small airways are damaged and lose their stretchiness. The airways collapse when you exhale, causing air to get trapped in the air sacs. This means that less oxygen enters the blood vessels and less oxygen is delivered to all of the cells of your body. This makes it hard to breathe.     Damage to cilia    Cilia are small hairs that line and protect the airways. Smoking damages the cilia. Damaged cilia cant sweep mucus and particles away. Some of the cilia are destroyed. This damage worsens COPD.  How did I get COPD?  Most people get COPD from smoking. Cigarette smoke damages lungs, which can develop into COPD over many years.  How COPD affects you  COPD makes you work harder to breathe. Air may get trapped in the lungs, which prevents your lungs from filling completely with fresh oxygen-filled air when you inhale (breathe in). It's harder to take deep breaths especially when you are active and start breathing faster. Over time, your lungs may become enlarged filling the lung with air that does not transfer oxygen into the blood. These problems cause you to have shortness of breath (also called dyspnea). Wheezing (hoarse, whistling breathing),  chronic cough, and fatigue (feeling tired and worn out) are also common.   Date Last Reviewed: 5/1/2016  © 9677-7818 [employer] [address] [place] [address], [place] [address]. All rights reserved. This information is not intended as a substitute for professional medical care. Always follow your healthcare professional's instructions.

## 2020-06-02 DIAGNOSIS — J44.9 CHRONIC OBSTRUCTIVE PULMONARY DISEASE, UNSPECIFIED COPD TYPE: Primary | ICD-10-CM

## 2020-06-15 ENCOUNTER — HOSPITAL ENCOUNTER (OUTPATIENT)
Dept: CARDIOLOGY | Facility: HOSPITAL | Age: 65
Discharge: HOME | End: 2020-06-15
Attending: HOSPITALIST
Payer: MEDICARE

## 2020-06-15 ENCOUNTER — HOSPITAL ENCOUNTER (OUTPATIENT)
Dept: RADIOLOGY | Facility: HOSPITAL | Age: 65
Discharge: HOME | End: 2020-06-15
Attending: NURSE PRACTITIONER
Payer: MEDICARE

## 2020-06-15 VITALS
DIASTOLIC BLOOD PRESSURE: 67 MMHG | HEIGHT: 72 IN | SYSTOLIC BLOOD PRESSURE: 112 MMHG | WEIGHT: 315 LBS | HEART RATE: 70 BPM | BODY MASS INDEX: 42.66 KG/M2

## 2020-06-15 DIAGNOSIS — Z12.9 SCREENING FOR CANCER: ICD-10-CM

## 2020-06-15 LAB
ASCENDING AORTA: 3.37 CM
AV INDEX (PROSTH): 0.84
AV MEAN GRADIENT: 3 MMHG
AV PEAK GRADIENT: 5 MMHG
AV VALVE AREA: 4.03 CM2
AV VELOCITY RATIO: 0.97
BSA FOR ECHO PROCEDURE: 2.99 M2
CV ECHO LV RWT: 0.43 CM
DOP CALC AO PEAK VEL: 1.15 M/S
DOP CALC AO VTI: 26.23 CM
DOP CALC LVOT AREA: 4.8 CM2
DOP CALC LVOT DIAMETER: 2.48 CM
DOP CALC LVOT PEAK VEL: 1.12 M/S
DOP CALC LVOT STROKE VOLUME: 105.83 CM3
DOP CALCLVOT PEAK VEL VTI: 21.92 CM
E WAVE DECELERATION TIME: 185.93 MSEC
E/A RATIO: 0.87
E/E' RATIO: 7.89 M/S
ECHO LV POSTERIOR WALL: 1.03 CM (ref 0.6–1.1)
FRACTIONAL SHORTENING: 25 % (ref 28–44)
INTERVENTRICULAR SEPTUM: 1.11 CM (ref 0.6–1.1)
LA MAJOR: 5.26 CM
LA MINOR: 5.29 CM
LA WIDTH: 4.4 CM
LEFT ATRIUM SIZE: 3.89 CM
LEFT ATRIUM VOLUME INDEX MOD: 36.4 ML/M2
LEFT ATRIUM VOLUME INDEX: 27.2 ML/M2
LEFT ATRIUM VOLUME MOD: 103 CM3
LEFT ATRIUM VOLUME: 76.74 CM3
LEFT INTERNAL DIMENSION IN SYSTOLE: 3.55 CM (ref 2.1–4)
LEFT VENTRICLE DIASTOLIC VOLUME INDEX: 37.15 ML/M2
LEFT VENTRICLE DIASTOLIC VOLUME: 105 ML
LEFT VENTRICLE MASS INDEX: 65 G/M2
LEFT VENTRICLE SYSTOLIC VOLUME INDEX: 18.6 ML/M2
LEFT VENTRICLE SYSTOLIC VOLUME: 52.7 ML
LEFT VENTRICULAR INTERNAL DIMENSION IN DIASTOLE: 4.75 CM (ref 3.5–6)
LEFT VENTRICULAR MASS: 183.57 G
LV LATERAL E/E' RATIO: 6.45 M/S
LV SEPTAL E/E' RATIO: 10.14 M/S
MV PEAK A VEL: 0.82 M/S
MV PEAK E VEL: 0.71 M/S
MV STENOSIS PRESSURE HALF TIME: 53.92 MS
MV VALVE AREA P 1/2 METHOD: 4.08 CM2
PISA TR MAX VEL: 2.54 M/S
PULM VEIN S/D RATIO: 1.18
PV PEAK D VEL: 0.33 M/S
PV PEAK S VEL: 0.39 M/S
RA MAJOR: 5.47 CM
RA WIDTH: 4.31 CM
RIGHT VENTRICULAR END-DIASTOLIC DIMENSION: 4.3 CM
RV TISSUE DOPPLER FREE WALL SYSTOLIC VELOCITY 1 (APICAL 4 CHAMBER VIEW): 9.62 CM/S
SINUS: 3.84 CM
STJ: 3.43 CM
TDI LATERAL: 0.11 M/S
TDI SEPTAL: 0.07 M/S
TDI: 0.09 M/S
TR MAX PG: 26 MMHG
TRICUSPID ANNULAR PLANE SYSTOLIC EXCURSION: 1.74 CM

## 2020-06-15 PROCEDURE — 71250 CT THORAX DX C-: CPT | Mod: TC

## 2020-06-15 PROCEDURE — 71250 CT THORAX DX C-: CPT | Mod: 26,,, | Performed by: RADIOLOGY

## 2020-06-15 PROCEDURE — 93306 TTE W/DOPPLER COMPLETE: CPT

## 2020-06-15 PROCEDURE — 71250 CT CHEST WITHOUT CONTRAST: ICD-10-PCS | Mod: 26,,, | Performed by: RADIOLOGY

## 2020-06-15 PROCEDURE — 93306 TRANSTHORACIC ECHO (TTE) COMPLETE (CUPID ONLY): ICD-10-PCS | Mod: 26,,, | Performed by: INTERNAL MEDICINE

## 2020-06-15 PROCEDURE — 93306 TTE W/DOPPLER COMPLETE: CPT | Mod: 26,,, | Performed by: INTERNAL MEDICINE

## 2020-06-16 ENCOUNTER — TELEPHONE (OUTPATIENT)
Dept: PULMONOLOGY | Facility: CLINIC | Age: 65
End: 2020-06-16

## 2020-06-16 NOTE — TELEPHONE ENCOUNTER
I spoke to patient to let him know he will need covid test 72-48hrs prior to appointment on 6/19/20 at 2:30pm with Radha Nolasco Np with pft's prior.Patient stated he is not sure is he wants to have a covid test and will need to call the office back.

## 2020-06-18 ENCOUNTER — LAB VISIT (OUTPATIENT)
Dept: INTERNAL MEDICINE | Facility: CLINIC | Age: 65
End: 2020-06-18
Payer: MEDICARE

## 2020-06-18 ENCOUNTER — TELEPHONE (OUTPATIENT)
Dept: PULMONOLOGY | Facility: CLINIC | Age: 65
End: 2020-06-18

## 2020-06-18 DIAGNOSIS — J44.9 CHRONIC OBSTRUCTIVE PULMONARY DISEASE, UNSPECIFIED COPD TYPE: ICD-10-CM

## 2020-06-18 PROCEDURE — U0003 INFECTIOUS AGENT DETECTION BY NUCLEIC ACID (DNA OR RNA); SEVERE ACUTE RESPIRATORY SYNDROME CORONAVIRUS 2 (SARS-COV-2) (CORONAVIRUS DISEASE [COVID-19]), AMPLIFIED PROBE TECHNIQUE, MAKING USE OF HIGH THROUGHPUT TECHNOLOGIES AS DESCRIBED BY CMS-2020-01-R: HCPCS

## 2020-06-18 NOTE — TELEPHONE ENCOUNTER
Spoke with patient  in regards to his scheduled appointment on tomorrow. I informed  of his appointment times and also office location.  Patient stated he also did his COVID test. I informed patient if we have any questions or concerns we will reach out tomorrow morning.  Patient he understands.

## 2020-06-18 NOTE — TELEPHONE ENCOUNTER
----- Message from Kandis Roach sent at 6/18/2020  4:06 PM CDT -----  Contact: [facility]ient  Called [phone]  Had COVID test done on this morning.  Want to know if she should still attend his apts scheduled for tomorrow.

## 2020-06-19 ENCOUNTER — HOSPITAL ENCOUNTER (OUTPATIENT)
Dept: PULMONOLOGY | Facility: CLINIC | Age: 65
Discharge: HOME | End: 2020-06-19
Payer: MEDICARE

## 2020-06-19 ENCOUNTER — OFFICE VISIT (OUTPATIENT)
Dept: PULMONOLOGY | Facility: CLINIC | Age: 65
End: 2020-06-19
Payer: MEDICARE

## 2020-06-19 VITALS
OXYGEN SATURATION: 95 % | WEIGHT: 315 LBS | BODY MASS INDEX: 42.66 KG/M2 | HEART RATE: 71 BPM | SYSTOLIC BLOOD PRESSURE: 184 MMHG | DIASTOLIC BLOOD PRESSURE: 86 MMHG | HEIGHT: 72 IN

## 2020-06-19 VITALS — BODY MASS INDEX: 42.66 KG/M2 | HEIGHT: 72 IN | WEIGHT: 315 LBS

## 2020-06-19 DIAGNOSIS — J18.9 COMMUNITY ACQUIRED PNEUMONIA, UNSPECIFIED LATERALITY: ICD-10-CM

## 2020-06-19 DIAGNOSIS — J43.2 CENTRILOBULAR EMPHYSEMA: Primary | ICD-10-CM

## 2020-06-19 DIAGNOSIS — J44.9 CHRONIC OBSTRUCTIVE PULMONARY DISEASE, UNSPECIFIED COPD TYPE: ICD-10-CM

## 2020-06-19 DIAGNOSIS — K76.9 LIVER LESION: ICD-10-CM

## 2020-06-19 DIAGNOSIS — I10 HYPERTENSION, UNSPECIFIED TYPE: ICD-10-CM

## 2020-06-19 DIAGNOSIS — R91.8 PULMONARY NODULES: ICD-10-CM

## 2020-06-19 DIAGNOSIS — R06.81 WITNESSED EPISODE OF APNEA: ICD-10-CM

## 2020-06-19 DIAGNOSIS — J96.01 ACUTE HYPOXEMIC RESPIRATORY FAILURE: ICD-10-CM

## 2020-06-19 LAB — SARS-COV-2 RNA RESP QL NAA+PROBE: NOT DETECTED

## 2020-06-19 PROCEDURE — 94060 PR EVAL OF BRONCHOSPASM: ICD-10-PCS | Mod: S$GLB,,, | Performed by: INTERNAL MEDICINE

## 2020-06-19 PROCEDURE — 99999 PR PBB SHADOW E&M-EST. PATIENT-LVL IV: ICD-10-PCS | Mod: PBBFAC,,, | Performed by: NURSE PRACTITIONER

## 2020-06-19 PROCEDURE — 94729 DIFFUSING CAPACITY: CPT | Mod: S$GLB,,, | Performed by: INTERNAL MEDICINE

## 2020-06-19 PROCEDURE — 94727 GAS DIL/WSHOT DETER LNG VOL: CPT | Mod: S$GLB,,, | Performed by: INTERNAL MEDICINE

## 2020-06-19 PROCEDURE — 94060 EVALUATION OF WHEEZING: CPT | Mod: S$GLB,,, | Performed by: INTERNAL MEDICINE

## 2020-06-19 PROCEDURE — 99214 PR OFFICE/OUTPT VISIT, EST, LEVL IV, 30-39 MIN: ICD-10-PCS | Mod: 25,S$GLB,, | Performed by: NURSE PRACTITIONER

## 2020-06-19 PROCEDURE — 99999 PR PBB SHADOW E&M-EST. PATIENT-LVL IV: CPT | Mod: PBBFAC,,, | Performed by: NURSE PRACTITIONER

## 2020-06-19 PROCEDURE — 94618 PULMONARY STRESS TESTING: ICD-10-PCS | Mod: S$GLB,,, | Performed by: INTERNAL MEDICINE

## 2020-06-19 PROCEDURE — 94618 PULMONARY STRESS TESTING: CPT | Mod: S$GLB,,, | Performed by: INTERNAL MEDICINE

## 2020-06-19 PROCEDURE — 99214 OFFICE O/P EST MOD 30 MIN: CPT | Mod: 25,S$GLB,, | Performed by: NURSE PRACTITIONER

## 2020-06-19 PROCEDURE — 94729 PR C02/MEMBANE DIFFUSE CAPACITY: ICD-10-PCS | Mod: S$GLB,,, | Performed by: INTERNAL MEDICINE

## 2020-06-19 PROCEDURE — 94727 PR PULM FUNCTION TEST BY GAS: ICD-10-PCS | Mod: S$GLB,,, | Performed by: INTERNAL MEDICINE

## 2020-06-19 RX ORDER — TIOTROPIUM BROMIDE 18 UG/1
18 CAPSULE ORAL; RESPIRATORY (INHALATION) DAILY
Qty: 30 CAPSULE | Refills: 5 | Status: SHIPPED | OUTPATIENT
Start: 2020-06-19

## 2020-06-19 RX ORDER — METOPROLOL TARTRATE 50 MG/1
TABLET ORAL
COMMUNITY
Start: 2020-05-18 | End: 2020-11-17 | Stop reason: SDUPTHER

## 2020-06-19 NOTE — PROGRESS NOTES
Subjective:       Patient ID: Facundo Lord Jr. is a 64 y.o. male.    Chief Complaint: COPD    HPI     Mr. Lord is a 65 y/o M with hx of CHF (has pacer/AICD), Long term anticoagulation (Plavix) HTN, morbid obesity, and RA presenting to pulmonary clinic for COPD. He was last followed with by myself with  audio visitation in lieu of the COVID-19 pandemic. Dwight has been diagnosed with COPD years ago in relation to an extensive smoking history.  Currently, he is not on any cobtroller inhaler therapy. Does use Duoneb once a day.  Tells was prescribed Spiriva in past- did find it beneficial. Dwight has recently quit smoking (3/18/2020) with help of Chantix. Disclose smoking from age of 13- 64 years old between 1-2 packs a day. PFTs today FEV1 2.30L (62% of predicted), FVC 3.35 L (69% of predicted) TLC 4.81 L(63% of predicted), DLCO 14.41( 47% of predicted). CTn6/15/2020- reveals mild paraseptal and centrilobular emphysema at lung apices, subtle GGO and interlobular septal thickening the lung bases likely reflecting intestinal pulmonary edema, 0.4 cm LLL nodule, 0.3cm subpleural RLL and 0.3 cm RLL nodule,mild cardiomegaly, and 1 cm  Liver lesion. 6 MWT suggestive of desaturation, but does not qualify for oxygen.     Patient  experiences chronic mMRC 3 symptoms of dyspnea. Suni dyspnea has been present for years. Notes to have mild chronic cough. Dwight had 1 COPD exacerbations in the last one year; 1 hospitalizations for respiratory problems. He denies any increase in cough, sputum production,  fever, chills, sore throat, N/V/D or any worsening symptom on dyspnea. Over the last 2-3 months (during the COVID pandemic quarantine),  he does report appx 40 lbs weight gain and some lower leg swelling. He reports has not followed up with Cardiology- follows with cardiology outside of [facility] system. He does continue to take Aldactone 25 mg daily. Also, he reports has been following with PCP for management of RA-  has upcoming appt with Rheumatology.     He reports having concerns for obstructive sleep apnea.  Experiences loud snoring, excessive daytime somnolence and witnessed apnea by his wife.  Would like to be referred to Sleep Medicine for further evaluation.    Additional Pulmonary History:   Travel History:  Denies  History of exposures to TB:  Denies   Family History of Lung Cancer:  Denies  Childhood history of Lung Disease:  Denies    Review of Systems   Constitutional: Positive for weight gain (Reports approximately 40 lb in 2-3 months) and fatigue (Reports excessive daytime sleepiness). Negative for fever, chills, weight loss and night sweats.   HENT: Negative for postnasal drip, rhinorrhea, trouble swallowing and congestion.    Respiratory: Positive for apnea, snoring, shortness of breath (See HPI) and somnolence. Negative for cough, sputum production, choking, chest tightness, wheezing and use of rescue inhaler.    Cardiovascular: Positive for leg swelling. Negative for chest pain.   Musculoskeletal: Positive for joint swelling (Reports hx of RA).   Skin: Negative for rash.   Gastrointestinal: Negative for acid reflux.   Neurological: Negative for dizziness and light-headedness.   Hematological: Negative for adenopathy.   Psychiatric/Behavioral: Positive for sleep disturbance.       Objective:      Vitals:    06/19/20 1436   BP: (!) 184/86   BP Location: Left arm   Patient Position: Sitting   Pulse: 71   SpO2: 95%   Weight: (!) 174.2 kg (384 lb)   Height: 6' (1.829 m)      Physical Exam   Constitutional: He is oriented to person, place, and time. He appears well-developed and well-nourished. No distress. He is obese.   HENT:   Head: Normocephalic.   Neck: Normal range of motion. Neck supple.   Cardiovascular: Normal rate, regular rhythm and normal heart sounds.   Pulmonary/Chest: Normal expansion, effort normal and breath sounds normal. No respiratory distress. He has no wheezes. He has no rhonchi.   Abdominal:  "Soft. Bowel sounds are normal. There is no abdominal tenderness.   Musculoskeletal: Normal range of motion.         General: No edema (+1/4 bilateral lower leg swelling. ).   Lymphadenopathy: No supraclavicular adenopathy is present.     He has no cervical adenopathy.   Neurological: He is alert and oriented to person, place, and time. Gait normal.   Skin: Skin is warm and dry. Nails show no clubbing.   Psychiatric: He has a normal mood and affect. His behavior is normal.   Vitals reviewed.    Personal Diagnostic Review  Pulmonary function tests: FEV1: 2.30  (63% predicted), FVC:  3.35 (69 % predicted), FEV1/FVC: 69, T.81 (64 % predicted), DLCO: 14.4 (48 % predicted)    CT Chest Without Contrast 2020-  Impression: 1. Mild paraseptal and centrilobular emphysema at the lung apices.  2. Constellation of trace bilateral dependent pleural fluid and septal thickening with subtle ground-glass at the lung bases likely reflect pulmonary edema.  3. There are a few small pulmonary nodules as detailed above.  No prior imaging is available to determine their chronicity.  For a solid nodule <6 mm, Fleischner Society 2017 guidelines recommend no routine follow up for a low risk patient, or follow-up with non-contrast chest CT at 12 months in a high risk patient.  Note in the medical record from any ED visit dated 2019 refers to a clinical history of "cancer"; no details provided.  However, in patients with history of cancer as noted in this patient's medical records, oncologic considerations should determine the role and schedule for follow-up.  At a minimum in this patient with a history of 5100 pack years cigarette consumption we recommend repeat chest CT after an interval of 12 months (2021).  4. Mild cardiomegaly with calcific atherosclerosis of the coronary arteries.  5. 1 cm nonspecific hypoattenuating focus in the liver which could represent focal steatosis, complex cyst, or mass lesion.  Consider " further evaluation with ultrasound if clinically warranted.  This report was flagged in Epic as abnormal.    6 MWT 6/19/2020-  06/19/2020---------Distance: 182.88 meters (600 feet)      O2 Sat % Supplemental Oxygen Heart Rate Blood Pressure Alea   Scale   Pre-exercise   (Resting) 98 % Room Air 74 bpm 175/87 mmHg 3   During Exercise 93 % Room Air 124 bpm 204/96 mmHg 9   Post-exercise   (Recovery) 97 % Room Air  87 bpm 184/86 mmHg           Assessment:       1. Centrilobular emphysema    2. Witnessed episode of apnea    3. Hypertension, unspecified type    4. BMI 50.0-59.9, adult    5. Acute hypoxemic respiratory failure    6. Community acquired pneumonia, unspecified laterality    7. Liver lesion    8. Pulmonary nodules        Outpatient Encounter Medications as of 6/19/2020   Medication Sig Dispense Refill    albuterol-ipratropium (DUO-NEB) 2.5 mg-0.5 mg/3 mL nebulizer solution Take 3 mLs by nebulization 2 (two) times daily as needed for Wheezing or Shortness of Breath. Rescue 1 Box 1    amiodarone (PACERONE) 200 MG Tab Take by mouth once daily.      aspirin (ECOTRIN) 81 MG EC tablet Take 81 mg by mouth once daily.      atorvastatin (LIPITOR) 40 MG tablet Take 40 mg by mouth once daily.      clopidogrel (PLAVIX) 75 mg tablet Take 75 mg by mouth once daily.      folic acid (FOLVITE) 1 MG tablet Take 1 mg by mouth once daily.      losartan (COZAAR) 50 MG tablet Take 50 mg by mouth once daily.      metoprolol succinate (TOPROL-XL) 50 MG 24 hr tablet Take 50 mg by mouth 2 (two) times daily.       metoprolol tartrate (LOPRESSOR) 50 MG tablet       pentoxifylline (TRENTAL) 400 mg TbSR Take 400 mg by mouth 3 (three) times daily with meals.      potassium chloride (MICRO-K) 10 MEQ CpSR Take 20 mEq by mouth once.       spironolactone (ALDACTONE) 25 MG tablet Take 25 mg by mouth once daily.      varenicline (CHANTIX) 1 mg Tab Take 1 tablet (1 mg total) by mouth 2 (two) times daily. 58 tablet 0    tiotropium  (SPIRIVA WITH HANDIHALER) 18 mcg inhalation capsule Inhale 1 capsule (18 mcg total) into the lungs once daily. Controller 30 capsule 5     No facility-administered encounter medications on file as of 6/19/2020.      Orders Placed This Encounter   Procedures    Ambulatory referral/consult to Sleep Disorders     Standing Status:   Future     Standing Expiration Date:   7/19/2021     Referral Priority:   Routine     Referral Type:   Consultation     Requested Specialty:   Sleep Medicine     Number of Visits Requested:   1     Plan:         Problem List Items Addressed This Visit        Pulmonary    Chronic obstructive pulmonary disease - Primary    Overview     CT of chest showing emphysema.  Patient has a significant smoking history-quit smoking without with Chantix in March.  Has been on Spiriva in the past and benefited.  Has been using DuoNebs daily which he does find help. He does experience MMRC 3 symptoms of dyspnea. Does not qualify for oxygen.  Noted to have mild chronic cough.    Plan:  -Congratulated on smoking cessation  -Prescribe Spiriva one puff daily.   -Continue Duonebs as needed  -He follows with PCP from outside facility and thinks he is up-to-date on all vaccines he will check with his PCP  - concerns for some in his shortness of breath in relation to heart failure.  Blood pressure elevated on visitation.  Patient explains he will reach out to his cardiologist today.  May benefit from diuresis.   -Lung nodules repeat CT of chest June 2021.          Relevant Medications    tiotropium (SPIRIVA WITH HANDIHALER) 18 mcg inhalation capsule    Witnessed episode of apnea    Current Assessment & Plan     Reports witnessed apneic events by his wife.  Reports loud snoring and excessive daytime sleepiness.  Stop Bang score 8 high risk NO.  He is requesting to follow with sleep medicine    Plan:  -Ambulatory referral to sleep medicine.          Relevant Orders    Ambulatory referral/consult to Sleep Disorders     Pulmonary nodules    Overview     Noted on CT of chest 6/16/2020-   0.4 cm LLL nodule, 0.3cm subpleural RLL and 0.3 cm RLL nodule    Has significant smoking hx. Has hx of bladder CA.  Recommend 12 month surveillance (June 2021)         RESOLVED: CAP (community acquired pneumonia)    Overview     Resolved         RESOLVED: Acute hypoxemic respiratory failure    Overview     Resolved. Today's 6MWT shows Mr. Lord does not qualify for oxygen therapy.             Cardiac/Vascular    HTN (hypertension)    Overview     /86. Reports 40 lbs in 2 months. CT of chest shoes mild cardiomegaly in this patient with history of heart failure.  Has AICD/pacer.  Recommend to follow up with cardiology. He reports will call today to make appt. May benefit from diuresis.             Endocrine    BMI 50.0-59.9, adult    Overview     Discussed with pt at length about the need to work on diet and weight loss.  Have offered suggestions on approaches for this problem.  Also discussed the need to start a regular exercise program.  We discussed at length the importance of regular exercise and working at getting to a healthier weight.  All questions answered.  Pt voices understanding of these principles and hopefully will take action.              GI    Liver lesion    Overview     Noted on CT of chest 6/16/2020-  1 cm nonspecific hypoattenuating focus in the liver which could represent focal steatosis, complex cyst, or mass lesion.  Consider further evaluation with ultrasound if clinically warranted.    Plan:  -I discussed this finding with patient and recommended he follow up with PCP to consider further work up. He is aware and verbalizes understanding.              Follow up in 3 months.      This note is dictated on M*Modal word recognition program.  There are word recognition mistakes that are occasionally missed on review.

## 2020-06-19 NOTE — ASSESSMENT & PLAN NOTE
Reports witnessed apneic events by his wife.  Reports loud snoring and excessive daytime sleepiness.  Stop Bang score 8 high risk NO.  He is requesting to follow with sleep medicine    Plan:  -Ambulatory referral to sleep medicine.

## 2020-06-21 PROBLEM — K76.9 LIVER LESION: Status: ACTIVE | Noted: 2020-06-21

## 2020-06-21 PROBLEM — J96.01 ACUTE HYPOXEMIC RESPIRATORY FAILURE: Status: RESOLVED | Noted: 2019-04-24 | Resolved: 2020-06-21

## 2020-06-21 PROBLEM — R91.8 PULMONARY NODULES: Status: ACTIVE | Noted: 2020-06-21

## 2020-06-21 PROBLEM — J18.9 CAP (COMMUNITY ACQUIRED PNEUMONIA): Status: RESOLVED | Noted: 2019-04-24 | Resolved: 2020-06-21

## 2020-06-21 NOTE — PATIENT INSTRUCTIONS
-Congraduation on being smoke free since March. Keep working on remaining smoke free.   -Start Spiriva one puff daily.    -Continue Duo Nebs as needed  -Please follow up with cardiology in relation to weight gain and hypertension  -Please follow up with PCP for 1 cm nodule of liver seen on CT- PCP will provided further recommendations if indicated.   -A referral to Sleep medicine - has been placed - you will be called to schedule an appt.   -We discussed diet and weight loss. Encourage daily exercise.   - Follow up in 3 months.

## 2020-06-22 ENCOUNTER — CLINICAL SUPPORT (OUTPATIENT)
Dept: SMOKING CESSATION | Facility: CLINIC | Age: 65
End: 2020-06-22
Payer: COMMERCIAL

## 2020-06-22 DIAGNOSIS — F17.200 NICOTINE DEPENDENCE: Primary | ICD-10-CM

## 2020-06-22 PROCEDURE — 99407 BEHAV CHNG SMOKING > 10 MIN: CPT | Mod: S$GLB,,,

## 2020-06-22 PROCEDURE — 99407 PR TOBACCO USE CESSATION INTENSIVE >10 MINUTES: ICD-10-PCS | Mod: S$GLB,,,

## 2020-06-22 NOTE — PROGRESS NOTES
Spoke with patient today in regard to smoking cessation progress for 3,6 month phone follow up  on Quit 2. Patient not tobacco free at this time. Patient has scheduled an appointment to return to the program for Quit attempt #3. Informed patient of benefit period, future follow ups, and contact information if any further help or support is needed. Will  complete smart form for Quit attempt #2.

## 2020-06-30 ENCOUNTER — TELEPHONE (OUTPATIENT)
Dept: SMOKING CESSATION | Facility: CLINIC | Age: 65
End: 2020-06-30

## 2020-06-30 NOTE — TELEPHONE ENCOUNTER
Left message with my name Kristyn Remy and phone number [phone] about missed appointment to start the tobacco cessation program.

## 2020-07-02 ENCOUNTER — CLINICAL SUPPORT (OUTPATIENT)
Dept: SMOKING CESSATION | Facility: CLINIC | Age: 65
End: 2020-07-02
Payer: COMMERCIAL

## 2020-07-02 DIAGNOSIS — F17.210 CIGARETTE NICOTINE DEPENDENCE, UNCOMPLICATED: Primary | ICD-10-CM

## 2020-07-02 PROCEDURE — 99404 PR PREVENT COUNSEL,INDIV,60 MIN: ICD-10-PCS | Mod: S$GLB,,,

## 2020-07-02 PROCEDURE — 99404 PREV MED CNSL INDIV APPRX 60: CPT | Mod: S$GLB,,,

## 2020-07-02 RX ORDER — VARENICLINE TARTRATE 0.5 (11)-1
KIT ORAL
Qty: 53 TABLET | Refills: 0 | Status: SHIPPED | OUTPATIENT
Start: 2020-07-02 | End: 2020-08-03 | Stop reason: ALTCHOICE

## 2020-07-09 ENCOUNTER — TELEPHONE (OUTPATIENT)
Dept: SMOKING CESSATION | Facility: CLINIC | Age: 65
End: 2020-07-09

## 2020-07-16 ENCOUNTER — CLINICAL SUPPORT (OUTPATIENT)
Dept: SMOKING CESSATION | Facility: CLINIC | Age: 65
End: 2020-07-16
Payer: COMMERCIAL

## 2020-07-16 DIAGNOSIS — F17.210 CIGARETTE NICOTINE DEPENDENCE, UNCOMPLICATED: Primary | ICD-10-CM

## 2020-07-16 PROCEDURE — 99402 PR PREVENT COUNSEL,INDIV,30 MIN: ICD-10-PCS | Mod: S$GLB,,,

## 2020-07-16 PROCEDURE — 99402 PREV MED CNSL INDIV APPRX 30: CPT | Mod: S$GLB,,,

## 2020-07-16 NOTE — PROGRESS NOTES
Individual Follow-Up Form    7/16/2020    Quit Date: 1/16/2020    Clinical Status of Patient: Outpatient    Length of Service: 30 minutes    Continuing Medication: yes  Chantix    Other Medications:      Target Symptoms: Withdrawal and medication side effects. The following were  rated moderate (3) to severe (4) on TCRS:  · Moderate (3):   · Severe (4):     Comments: Due to COVID-19 session done by phone. Verbal review of TCRS (Tobacco Cessation Rating Scale) reviewed strategies, cues, and triggers. Introduced the negative impact of tobacco on health, the health advantages of discontinuing the use of tobacco, time line improved health changes after a quit, withdrawal issues to expect from nicotine and habit, and ways to achieve the goal of a quit The patient remains tobacco free as of 1/16/2020 and is not experiencing any negative side effects. The patient stated without the Chantix he does not think he can stay tobacco free and discussed how it is no longer a nicotine issue and more of a habit call since he has been tobacco free since January. The patient remains on the prescribed tobacco cessation medication regimen of 1 mg Chantix BID without any negative side effects at this time. The patient denies any abnormal behavioral or mental changes at this time. The patient will continue with therapy sessions and medication monitoring by CTTS. Prescribed medication management will be by physician.     Diagnosis: F17.210     Next Visit: 2 weeks

## 2020-07-30 ENCOUNTER — TELEPHONE (OUTPATIENT)
Dept: SMOKING CESSATION | Facility: CLINIC | Age: 65
End: 2020-07-30

## 2020-07-30 NOTE — TELEPHONE ENCOUNTER
Left a message that this is a follow up on reduction strategy and medication use. Left my name Kristyn Remy and phone number [phone].

## 2020-08-03 ENCOUNTER — CLINICAL SUPPORT (OUTPATIENT)
Dept: SMOKING CESSATION | Facility: CLINIC | Age: 65
End: 2020-08-03
Payer: COMMERCIAL

## 2020-08-03 DIAGNOSIS — F17.210 CIGARETTE NICOTINE DEPENDENCE, UNCOMPLICATED: Primary | ICD-10-CM

## 2020-08-03 PROCEDURE — 99402 PR PREVENT COUNSEL,INDIV,30 MIN: ICD-10-PCS | Mod: S$GLB,,,

## 2020-08-03 PROCEDURE — 99402 PREV MED CNSL INDIV APPRX 30: CPT | Mod: S$GLB,,,

## 2020-08-03 PROCEDURE — 99999 PR PBB SHADOW E&M-EST. PATIENT-LVL I: ICD-10-PCS | Mod: PBBFAC,,,

## 2020-08-03 PROCEDURE — 99999 PR PBB SHADOW E&M-EST. PATIENT-LVL I: CPT | Mod: PBBFAC,,,

## 2020-08-03 RX ORDER — VARENICLINE TARTRATE 1 MG/1
1 TABLET, FILM COATED ORAL 2 TIMES DAILY
Qty: 56 TABLET | Refills: 0 | Status: SHIPPED | OUTPATIENT
Start: 2020-08-03 | End: 2020-09-02 | Stop reason: SDUPTHER

## 2020-08-03 NOTE — PROGRESS NOTES
Individual Follow-Up Form    8/3/2020    Quit Date: 1/16/2020    Clinical Status of Patient: Outpatient    Length of Service: 30 minutes    Continuing Medication: yes  Chantix    Other Medications:      Target Symptoms: Withdrawal and medication side effects. The following were  rated moderate (3) to severe (4) on TCRS:  · Moderate (3): none  · Severe (4): none    Comments: Due to COVID-19 session done by phone. Verbal review of TCRS (Tobacco Cessation Rating Scale) reviewed strategies, habitual behavior, high risks situations, understanding urges and cravings, stress and relaxation with open discussion and additional interventions, Introduced lapses, relapses, understanding them and analyzing the situation of a lapse, conflict issues that may be linked to a lapse. The patient remains tobacco free as of 1/16/2020 and stated he is going to be tempted tomorrow because he will be home by hisself while his son and wife are gone to the hospital. Discussed additional interventions to help and discussed how long he has been tobacco free. The patient remains on the prescribed tobacco cessation medication regimen of 1 mg Chantix BID without any negative side effects at this time. The patient denies any abnormal behavioral or mental changes at this time. The patient will continue with group therapy sessions and medication monitoring by CTTS. Prescribed medication management will be by physician.     Diagnosis: F17.210    Next Visit: 1 week

## 2020-08-18 ENCOUNTER — OFFICE VISIT (OUTPATIENT)
Dept: SLEEP MEDICINE | Facility: CLINIC | Age: 65
End: 2020-08-18
Payer: MEDICARE

## 2020-08-18 DIAGNOSIS — R06.81 WITNESSED EPISODE OF APNEA: ICD-10-CM

## 2020-08-18 DIAGNOSIS — G47.30 SLEEP APNEA, UNSPECIFIED TYPE: Primary | ICD-10-CM

## 2020-08-18 PROCEDURE — 99203 OFFICE O/P NEW LOW 30 MIN: CPT | Mod: 95,,, | Performed by: PSYCHIATRY & NEUROLOGY

## 2020-08-18 PROCEDURE — 99203 PR OFFICE/OUTPT VISIT, NEW, LEVL III, 30-44 MIN: ICD-10-PCS | Mod: 95,,, | Performed by: PSYCHIATRY & NEUROLOGY

## 2020-08-18 NOTE — LETTER
August 18, 2020      Radha Ferguson, JACOBO  [address] [address]           [facility]  [facility] [address]  Phone: [phone]  Fax: [phone]          Patient: Facundo Lord Jr.   MR Number: [id number]   YOB: 1955   Date of Visit: 8/18/2020       Dear Radha Ferguson:    Thank you for referring Facundo Lord to me for evaluation. Attached you will find relevant portions of my assessment and plan of care.    If you have questions, please do not hesitate to call me. I look forward to following Facundo Lord along with you.    Sincerely,    Ruth Almendarez MD    Enclosure  CC:  No Recipients    If you would like to receive this communication electronically, please contact [email or web address] or [phone] to request more information on [facility] access.    For providers and/or their staff who would like to refer a patient to [facility], please contact us through our one-stop-shop provider referral line, [facility] , at [phone].    If you feel you have received this communication in error or would no longer like to receive these types of communications, please e-mail [email or web address]

## 2020-08-18 NOTE — PROGRESS NOTES
The patient location is: home  The chief complaint leading to consultation is: sleep apnea evaluation  Visit type: audiovisual  Total time spent with patient: 30 min  Each patient to whom he or she provides medical services by telemedicine is:  (1) informed of the relationship between the physician and patient and the respective role of any other health care provider with respect to management of the patient; and (2) notified that he or she may decline to receive medical services by telemedicine and may withdraw from such care at any time.       Facundo Lord  was seen at the request of  Radha Ferguson NP  (pulmonary seen for COPD) for sleep evaluation.    08/18/2020 INITIAL HISTORY OF PRESENT ILLNESS:  Facundo Lord Jr. is a 64 y.o. male is here to be evaluated for a sleep disorder.       CHIEF COMPLAINT:      The patient's complaints include excessive daytime sleepiness, excessive daytime fatigue, snoring,  witnessed breathing pauses,  gasping for air in sleep and interrupted sleep since  many years ago.      Reports occasional difficulty falling and staying asleep.     Denies symptoms concerning for parasomnia except for occasional somniloquy.  he Denies cataplexy, sleep paralysis, hallucinations surrounding sleep time.       --------------------------------------------------  [place] Sleepiness Scale  The [place] Sleepiness Scale is used to determine the level of daytime sleepiness.  Use the following scale to choose the most appropriate number for each situation over the past two weeks.  Even if you don't usually do this activity, please give your best estimate:  0 = would never doze or sleep.  1 = slight chance of dozing or sleeping  2 = moderate chance of dozing or sleeping  3 = high chance of dozing or sleeping     Name:__________________________ Date:___________________    Situation                         Chance of Dozing or Sleeping      Sitting and reading                                                         _2___    Watching TV                                                                _3___    Sitting inactive in a public place                                              _0___    Being a passenger in a motor vehicle for an hour or more   __2__    Lying down in the afternoon                                                   _3___    Sitting and talking to someone                                       __0__    Sitting quietly after lunch (no alcohol)                           __1__    Stopped for a few minutes in traffic                             __0__    Total score                                                                        ___11_          FH: his son has SMA    SLEEP ROUTINE AND LIFESTYLE 08/18/2020 :  Bed time: 12 - 4 AM  SL: not long  Awakenings: 0-2; able to return to sleep in 30-40 min  WT: 10 AM-10:30         PREVIOUS SLEEP STUDIES:     none        Social History:  Occupation:retired  Bed partner: his wife  Exercise routine: +/-      DME:       PAST MEDICAL HISTORY:    Active Ambulatory Problems     Diagnosis Date Noted    Blister of buttock 05/04/2018    Primary osteoarthritis of right hip 11/26/2018    Chronic obstructive pulmonary disease 04/24/2019    Acute on chronic combined systolic and diastolic heart failure 04/24/2019    AICD (automatic cardioverter/defibrillator) present 04/24/2019    Cough 04/24/2019    HTN (hypertension) 04/24/2019    Screening for cancer 04/28/2020    Witnessed episode of apnea 06/19/2020    BMI 50.0-59.9, adult 06/19/2020    Liver lesion 06/21/2020    Pulmonary nodules 06/21/2020     Resolved Ambulatory Problems     Diagnosis Date Noted    Primary osteoarthritis of right hip 04/09/2018    CAP (community acquired pneumonia) 04/24/2019    Acute hypoxemic respiratory failure 04/24/2019     Past Medical History:   Diagnosis Date    Anticoagulant long-term use     Cancer     CHF (congestive heart failure)     COPD (chronic obstructive  pulmonary disease)     Coronary artery disease     Emphysema of lung     Hypertension     Rheumatoid arthritis                 PAST SURGICAL HISTORY:    Past Surgical History:   Procedure Laterality Date    anal fistula excision  2018    bladder cancer surgeruy Bilateral     CARDIAC DEFIBRILLATOR PLACEMENT      excision mass buttocks  2018    HIP ARTHROPLASTY Right 2018    Procedure: ARTHROPLASTY, HIP;  Surgeon: Jorge Luis Alejandra MD;  Location: [facility];  Service: Orthopedics;  Laterality: Right;  OFIRMEV    JOINT REPLACEMENT      pace maker      defibrillator    RELEASE OF TENDON OF HIP Right 2018    Procedure: RELEASE, TENDON, FLEXOR, HIP, OR HIP ADDUCTOR TENOTOMY;  Surgeon: Jorge Luis Alejandra MD;  Location: [facility];  Service: Orthopedics;  Laterality: Right;    TURBT           FAMILY HISTORY:                No family history on file.    SOCIAL HISTORY:          Tobacco:   Social History     Tobacco Use   Smoking Status Former Smoker    Packs/day: 0.50    Start date:     Quit date: 2020    Years since quittin.5   Smokeless Tobacco Never Used       alcohol use:    Social History     Substance and Sexual Activity   Alcohol Use Yes    Comment: Seldom                   ALLERGIES:    Review of patient's allergies indicates:   Allergen Reactions    Lisinopril Rash       CURRENT MEDICATIONS:    Current Outpatient Medications   Medication Sig Dispense Refill    albuterol-ipratropium (DUO-NEB) 2.5 mg-0.5 mg/3 mL nebulizer solution Take 3 mLs by nebulization 2 (two) times daily as needed for Wheezing or Shortness of Breath. Rescue 1 Box 1    amiodarone (PACERONE) 200 MG Tab Take by mouth once daily.      aspirin (ECOTRIN) 81 MG EC tablet Take 81 mg by mouth once daily.      atorvastatin (LIPITOR) 40 MG tablet Take 40 mg by mouth once daily.      clopidogrel (PLAVIX) 75 mg tablet Take 75 mg by mouth once daily.      folic acid (FOLVITE) 1 MG tablet Take 1 mg by mouth once  "daily.      losartan (COZAAR) 50 MG tablet Take 50 mg by mouth once daily.      metoprolol succinate (TOPROL-XL) 50 MG 24 hr tablet Take 50 mg by mouth 2 (two) times daily.       metoprolol tartrate (LOPRESSOR) 50 MG tablet       pentoxifylline (TRENTAL) 400 mg TbSR Take 400 mg by mouth 3 (three) times daily with meals.      potassium chloride (MICRO-K) 10 MEQ CpSR Take 20 mEq by mouth once.       spironolactone (ALDACTONE) 25 MG tablet Take 25 mg by mouth once daily.      tiotropium (SPIRIVA WITH HANDIHALER) 18 mcg inhalation capsule Inhale 1 capsule (18 mcg total) into the lungs once daily. Controller 30 capsule 5    varenicline (CHANTIX) 1 mg Tab Take 1 tablet (1 mg total) by mouth 2 (two) times daily. CX 56 tablet 0     No current facility-administered medications for this visit.                       PHYSICAL EXAM:  There were no vitals taken for this visit.        Using [facility]: 30 min      ASSESSMENT:    1. Sleep Apnea NEC. The patient symptomatically has  excessive daytime sleepiness, snoring,  witnessed breathing pauses, excessive daytime fatigue, gasping for air in sleep, interrupted sleep and nocturia  with exam findings of "a crowded oral airway and elevated body mass index. The patient has medical co-morbidities of CHF, heart disease and hypertension,  which can be worsened by NO. This warrants further investigation for possible obstructive sleep apnea.          PLAN:    Diagnostic: Polysomnogram in lab due to CODP. The nature of this procedure and its indication was discussed with the patient. he would  like to come discuss PSG results. He would like to wait till middle of September to do the study due to COVID isolation with his son who has SMA.          More than 15 minutes of this 30 minutes visit was spent in counseling: and coordination of care.     during our discussion today, we talked about the etiology of  NO as well as the potential ramifications of untreated sleep apnea, which " could include daytime sleepiness, hypertension, heart disease and/or stroke.  We discussed potential treatment options, which could include weight loss, body positioning, continuous positive airway pressure (CPAP), or referral for surgical consideration. Meanwhile, he  is urged to avoid supine sleep, weight gain and alcoholic beverages since all of these can worsen NO.     Precautions: The patient was advised to abstain from driving should he feel sleepy or drowsy.    Follow up: MD after the sleep study has been completed.     Thank you for allowing me the opportunity to participate in the care of your patient.    This visit summary will be sent to referring provider via inbasket

## 2020-08-20 ENCOUNTER — CLINICAL SUPPORT (OUTPATIENT)
Dept: SMOKING CESSATION | Facility: CLINIC | Age: 65
End: 2020-08-20
Payer: COMMERCIAL

## 2020-08-20 DIAGNOSIS — F17.210 CIGARETTE NICOTINE DEPENDENCE, UNCOMPLICATED: Primary | ICD-10-CM

## 2020-08-20 PROCEDURE — 99402 PREV MED CNSL INDIV APPRX 30: CPT | Mod: S$GLB,,,

## 2020-08-20 PROCEDURE — 99402 PR PREVENT COUNSEL,INDIV,30 MIN: ICD-10-PCS | Mod: S$GLB,,,

## 2020-08-20 NOTE — PROGRESS NOTES
Individual Follow-Up Form    8/20/2020    Quit Date: 1/16/2020    Clinical Status of Patient: Outpatient    Length of Service: 30 minutes    Continuing Medication: yes  Chantix    Other Medications:      Target Symptoms: Withdrawal and medication side effects. The following were  rated moderate (3) to severe (4) on TCRS:  · Moderate (3): none  · Severe (4): none    Comments: Due to COVID-19 session done by phone.  completion of TCRS (Tobacco Cessation Rating Scale) reviewed strategies, habitual behavior, high risks situations, understanding urges and cravings, stress and relaxation with open discussion and additional interventions, Introduced lapses, relapses, understanding them and analyzing the situation of a lapse, conflict issues that may be linked to a lapse. The patient remains tobacco free as of 1/16/2020 and is not experiencing any negative side effects at this time. The patient remains on the prescribed tobacco cessation medication regimen of 1 mg Chantix BID without any negative side effects at this time. The patient denies any abnormal behavioral or mental changes at this time. The patient will continue with group therapy sessions and medication monitoring by CTTS. Prescribed medication management will be by physician.     Diagnosis: F17.210    Next Visit: 2 weeks

## 2020-08-21 ENCOUNTER — TELEPHONE (OUTPATIENT)
Dept: SLEEP MEDICINE | Facility: OTHER | Age: 65
End: 2020-08-21

## 2020-08-28 ENCOUNTER — TELEPHONE (OUTPATIENT)
Dept: SLEEP MEDICINE | Facility: OTHER | Age: 65
End: 2020-08-28

## 2020-09-02 ENCOUNTER — CLINICAL SUPPORT (OUTPATIENT)
Dept: SMOKING CESSATION | Facility: CLINIC | Age: 65
End: 2020-09-02
Payer: COMMERCIAL

## 2020-09-02 ENCOUNTER — TELEPHONE (OUTPATIENT)
Dept: SMOKING CESSATION | Facility: CLINIC | Age: 65
End: 2020-09-02

## 2020-09-02 DIAGNOSIS — F17.210 CIGARETTE NICOTINE DEPENDENCE, UNCOMPLICATED: Primary | ICD-10-CM

## 2020-09-02 PROCEDURE — 99402 PR PREVENT COUNSEL,INDIV,30 MIN: ICD-10-PCS | Mod: S$GLB,,,

## 2020-09-02 PROCEDURE — 99402 PREV MED CNSL INDIV APPRX 30: CPT | Mod: S$GLB,,,

## 2020-09-02 RX ORDER — VARENICLINE TARTRATE 1 MG/1
1 TABLET, FILM COATED ORAL 2 TIMES DAILY
Qty: 56 TABLET | Refills: 0 | Status: SHIPPED | OUTPATIENT
Start: 2020-09-02 | End: 2020-09-30 | Stop reason: SDUPTHER

## 2020-09-02 NOTE — PROGRESS NOTES
Individual Follow-Up Form    9/2/2020    Quit Date: 1/16/2020    Clinical Status of Patient: Outpatient    Length of Service: 30 minutes    Continuing Medication: yes  Chantix    Other Medications:      Target Symptoms: Withdrawal and medication side effects. The following were  rated moderate (3) to severe (4) on TCRS:  · Moderate (3): none  · Severe (4): none    Comments: Due to COVID-19 session done by phone. Verbal review of TCRS (Tobacco Cessation Rating Scale) reviewed strategies, habitual behavior, high risks situations, understanding urges and cravings, stress and relaxation with open discussion and additional interventions, Introduced lapses, relapses, understanding them and analyzing the situation of a lapse, conflict issues that may be linked to a lapse. The patient remains tobacco free as of 1/16/2020 and is not experiencing any negative side effects from the quit at this time but the patient is still under the impression that without the Chantix he will go back to smoking. Attempted to advise the patient again without avail. The patient remains on the prescribed tobacco cessation medication regimen of 1 mg Chantix BID without any negative side effects at this time. The patient denies any abnormal behavioral or mental changes at this time. The patient will continue with group therapy sessions and medication monitoring by CTTS. Prescribed medication management will be by physician.     Diagnosis: F17.210    Next Visit: 1 week

## 2020-09-30 ENCOUNTER — CLINICAL SUPPORT (OUTPATIENT)
Dept: SMOKING CESSATION | Facility: CLINIC | Age: 65
End: 2020-09-30
Payer: COMMERCIAL

## 2020-09-30 DIAGNOSIS — F17.210 CIGARETTE NICOTINE DEPENDENCE, UNCOMPLICATED: Primary | ICD-10-CM

## 2020-09-30 PROCEDURE — 99402 PREV MED CNSL INDIV APPRX 30: CPT | Mod: S$GLB,,,

## 2020-09-30 PROCEDURE — 99402 PR PREVENT COUNSEL,INDIV,30 MIN: ICD-10-PCS | Mod: S$GLB,,,

## 2020-09-30 RX ORDER — VARENICLINE TARTRATE 1 MG/1
1 TABLET, FILM COATED ORAL 2 TIMES DAILY
Qty: 56 TABLET | Refills: 0 | Status: SHIPPED | OUTPATIENT
Start: 2020-09-30 | End: 2021-01-25

## 2020-09-30 NOTE — Clinical Note
Just a note to advise how the patient is progressing in the tobacco cessation program. The patient remains tobacco free as of 1/16/2020 and states he is not able to remain tobacco free without the Chantix. Reviewed again with the patient that he cannot stay on Chantix for the rest of his life and how being tobacco free for so long that the thought of smoking is the habit; due to not having any nicotine issues being tobacco free this long. Advised the patient that limitations on Chnatix will be enforced. The patient remains on the prescribed tobacco cessation medication regimen of 1 mg Chantix BID without any negative side effects at this time.

## 2020-09-30 NOTE — PROGRESS NOTES
Individual Follow-Up Form    9/30/2020    Quit Date: 1/16/2020    Clinical Status of Patient: Outpatient    Length of Service: 30 minutes    Continuing Medication: yes  Chantix    Other Medications:      Target Symptoms: Withdrawal and medication side effects. The following were  rated moderate (3) to severe (4) on TCRS:  · Moderate (3): none  · Severe (4): none    Comments: Due to COVID-19 session done by phone. Verbal review of TCRS (Tobacco Cessation Rating Scale) reviewed strategies, cues, triggers, high risk situations, lapses, relapses, diet, exercise, stress, relaxation, sleep, habitual behavior, and life style changes. The patient remains tobacco free as of 1/16/2020 and states he is not able to remain tobacco free without the Chantix. Reviewed again with the patient that he cannot stay on Chantix for the rest of his life and how being tobacco free for so long that the thought of smoking is the habit; due to not having any nicotine issues being tobacco free this long. Advised the patient that limitations on Chnatix will be enforced. The patient remains on the prescribed tobacco cessation medication regimen of 1 mg Chantix BID without any negative side effects at this time. The patient denies any abnormal behavioral or mental changes at this time. The patient will continue with group therapy sessions and medication monitoring by CTTS. Prescribed medication management will be by physician.     Diagnosis: F17.210    Next Visit: 2 weeks

## 2020-10-01 ENCOUNTER — TELEPHONE (OUTPATIENT)
Dept: SLEEP MEDICINE | Facility: CLINIC | Age: 65
End: 2020-10-01

## 2020-10-01 ENCOUNTER — TELEPHONE (OUTPATIENT)
Dept: SLEEP MEDICINE | Facility: OTHER | Age: 65
End: 2020-10-01

## 2020-10-01 DIAGNOSIS — G47.30 SLEEP APNEA, UNSPECIFIED TYPE: Primary | ICD-10-CM

## 2020-10-01 NOTE — TELEPHONE ENCOUNTER
COVID was reordered    _____    covid order  Received: 3 days ago  Message Contents   Kalyani Almendarez MD             Hi, Can you please place another order for this patient's covid test. Order that's in Epic says it expires on 10/18/20 & won't let me schedule after that day (he's scheduled for 10/26/20).

## 2020-10-14 ENCOUNTER — CLINICAL SUPPORT (OUTPATIENT)
Dept: SMOKING CESSATION | Facility: CLINIC | Age: 65
End: 2020-10-14
Payer: COMMERCIAL

## 2020-10-14 ENCOUNTER — TELEPHONE (OUTPATIENT)
Dept: SMOKING CESSATION | Facility: CLINIC | Age: 65
End: 2020-10-14

## 2020-10-14 DIAGNOSIS — F17.210 CIGARETTE NICOTINE DEPENDENCE, UNCOMPLICATED: Primary | ICD-10-CM

## 2020-10-14 PROCEDURE — 99407 PR TOBACCO USE CESSATION INTENSIVE >10 MINUTES: ICD-10-PCS | Mod: S$GLB,,,

## 2020-10-14 PROCEDURE — 99407 BEHAV CHNG SMOKING > 10 MIN: CPT | Mod: S$GLB,,,

## 2020-10-14 NOTE — TELEPHONE ENCOUNTER
Left a message that this is a follow up on reduction strategy and medication use. Left my name Kristyn Remy and phone number [phone].

## 2020-10-19 ENCOUNTER — OFFICE VISIT (OUTPATIENT)
Dept: OPHTHALMOLOGY | Facility: CLINIC | Age: 65
End: 2020-10-19
Payer: MEDICARE

## 2020-10-19 DIAGNOSIS — H25.13 NUCLEAR SCLEROSIS OF BOTH EYES: Primary | ICD-10-CM

## 2020-10-19 DIAGNOSIS — I10 ESSENTIAL HYPERTENSION: ICD-10-CM

## 2020-10-19 DIAGNOSIS — H52.7 REFRACTIVE ERROR: ICD-10-CM

## 2020-10-19 DIAGNOSIS — H25.043 POSTERIOR SUBCAPSULAR AGE-RELATED CATARACT OF BOTH EYES: ICD-10-CM

## 2020-10-19 PROCEDURE — 92136 OPHTHALMIC BIOMETRY: CPT | Mod: LT,S$GLB,, | Performed by: OPHTHALMOLOGY

## 2020-10-19 PROCEDURE — 99999 PR PBB SHADOW E&M-EST. PATIENT-LVL III: CPT | Mod: PBBFAC,,, | Performed by: OPHTHALMOLOGY

## 2020-10-19 PROCEDURE — 92014 PR EYE EXAM, EST PATIENT,COMPREHESV: ICD-10-PCS | Mod: S$GLB,,, | Performed by: OPHTHALMOLOGY

## 2020-10-19 PROCEDURE — 92136 BIOMETRY: ICD-10-PCS | Mod: LT,S$GLB,, | Performed by: OPHTHALMOLOGY

## 2020-10-19 PROCEDURE — 99999 PR PBB SHADOW E&M-EST. PATIENT-LVL III: ICD-10-PCS | Mod: PBBFAC,,, | Performed by: OPHTHALMOLOGY

## 2020-10-19 PROCEDURE — 92014 COMPRE OPH EXAM EST PT 1/>: CPT | Mod: S$GLB,,, | Performed by: OPHTHALMOLOGY

## 2020-10-19 RX ORDER — DUREZOL 0.5 MG/ML
1 EMULSION OPHTHALMIC 4 TIMES DAILY
Qty: 5 ML | Refills: 1 | Status: SHIPPED | OUTPATIENT
Start: 2020-11-24 | End: 2020-11-06

## 2020-10-19 RX ORDER — NEPAFENAC 3 MG/ML
1 SUSPENSION/ DROPS OPHTHALMIC DAILY
Qty: 3 ML | Refills: 1 | Status: SHIPPED | OUTPATIENT
Start: 2020-11-21 | End: 2020-11-06

## 2020-10-19 RX ORDER — OFLOXACIN 3 MG/ML
1 SOLUTION/ DROPS OPHTHALMIC 4 TIMES DAILY
Qty: 5 ML | Refills: 1 | Status: SHIPPED | OUTPATIENT
Start: 2020-11-21 | End: 2020-12-01

## 2020-10-19 NOTE — PROGRESS NOTES
Subjective:       Patient ID: Facundo Lord Jr. is a 64 y.o. male.    Chief Complaint: Cataract (Eval )    HPI     Cataract      Additional comments: Eval               Comments     Referred: Dr. Kwon    63 y/o male is here for Cataract Evaluation. H/o of Posterior Subcapsular   Cataract of both eyes. Pt reports decrease in vision OS>OD. Glare is a   bother at night. Occasional teary eyes. Denies floaters and flashes. Pt   wear OTC +4.00.     Eyemeds  No gtts          Last edited by Devin Irene on 10/19/2020  2:40 PM. (History)             Assessment:       1. Nuclear sclerosis of both eyes    2. Posterior subcapsular age-related cataract of both eyes    3. Essential hypertension    4. Refractive error        Plan:       Visually significant cataract OU -Pt. Wants Sx.     HTN-No retinopathy OD.  RE      Cataract Surgery Consent: Patient with a visually significant cataract with difficulties of ADLs, reading, driving, night vision, glare (any and all).  Discussed with Patient/Family/Caregiver: options, risks and benefits, expectations of cataract surgery, utilized an eye model with questions and answers to facilitate discussion.  Discussed lens options and patient understands that glasses may be required for optimal vision for distance and/or near vision after cataract surgery.  The Patient/Family/Caregiver  voice good understanding and patient wishes to proceed with surgery.  The patient will likely benefit from surgery and patient signed consent for Left Eye.  CE OS 11/24/2020 SN60WF 22.5,        OD 12/8/2020 SN60WF 23.0.  Control HTN.

## 2020-10-22 ENCOUNTER — TELEPHONE (OUTPATIENT)
Dept: OPHTHALMOLOGY | Facility: CLINIC | Age: 65
End: 2020-10-22

## 2020-10-22 DIAGNOSIS — H25.12 NUCLEAR SCLEROTIC CATARACT OF LEFT EYE: ICD-10-CM

## 2020-10-22 DIAGNOSIS — Z13.9 SCREENING PROCEDURE: ICD-10-CM

## 2020-10-22 DIAGNOSIS — H25.12 NS (NUCLEAR SCLEROSIS), LEFT: Primary | ICD-10-CM

## 2020-10-30 ENCOUNTER — TELEPHONE (OUTPATIENT)
Dept: SLEEP MEDICINE | Facility: CLINIC | Age: 65
End: 2020-10-30

## 2020-10-30 ENCOUNTER — TELEPHONE (OUTPATIENT)
Dept: SLEEP MEDICINE | Facility: OTHER | Age: 65
End: 2020-10-30

## 2020-10-30 NOTE — TELEPHONE ENCOUNTER
----- Message from Debi Morgan sent at 10/28/2020  1:36 PM CDT -----  Regarding: appt  Contact: pt @ [phone]  Pt calling to reschedule his appt that was scheduled for 10/29 with Dr. Almendarez (sleep study), please call.

## 2020-11-03 ENCOUNTER — TELEPHONE (OUTPATIENT)
Dept: OPHTHALMOLOGY | Facility: CLINIC | Age: 65
End: 2020-11-03

## 2020-11-03 DIAGNOSIS — Z13.9 SCREENING PROCEDURE: ICD-10-CM

## 2020-11-03 DIAGNOSIS — H25.11 NS (NUCLEAR SCLEROSIS), RIGHT: Primary | ICD-10-CM

## 2020-11-03 DIAGNOSIS — H25.11 NUCLEAR SCLEROTIC CATARACT OF RIGHT EYE: ICD-10-CM

## 2020-11-06 ENCOUNTER — TELEPHONE (OUTPATIENT)
Dept: OPHTHALMOLOGY | Facility: CLINIC | Age: 65
End: 2020-11-06

## 2020-11-06 DIAGNOSIS — H25.13 NUCLEAR SCLEROTIC CATARACT OF BOTH EYES: Primary | ICD-10-CM

## 2020-11-06 RX ORDER — PREDNISOLONE ACETATE 10 MG/ML
1 SUSPENSION/ DROPS OPHTHALMIC 4 TIMES DAILY
Qty: 5 ML | Refills: 1 | Status: SHIPPED | OUTPATIENT
Start: 2020-11-24 | End: 2020-12-24

## 2020-11-06 RX ORDER — KETOROLAC TROMETHAMINE 5 MG/ML
1 SOLUTION OPHTHALMIC 4 TIMES DAILY
Qty: 5 ML | Refills: 1 | Status: SHIPPED | OUTPATIENT
Start: 2020-11-21 | End: 2021-11-21

## 2020-11-06 NOTE — TELEPHONE ENCOUNTER
----- Message from Angela Schmid sent at 11/6/2020  1:09 PM CST -----  Contact: Facundo Garcia  Patient is calling to speak with someone in the office about surgery. Patient is calling about his eye drops. Patient contact number is [phone]

## 2020-11-13 ENCOUNTER — TELEPHONE (OUTPATIENT)
Dept: SLEEP MEDICINE | Facility: OTHER | Age: 65
End: 2020-11-13

## 2020-11-19 ENCOUNTER — TELEPHONE (OUTPATIENT)
Dept: OPHTHALMOLOGY | Facility: CLINIC | Age: 65
End: 2020-11-19

## 2020-11-19 NOTE — TELEPHONE ENCOUNTER
----- Message from Kandis Castillo sent at 11/19/2020 12:27 PM CST -----  Regarding: Medication  Contact: pt  Reason:Calling to get clarification on directions for eye drops when should he start taking them? Pt is scheduled for procedure on 11/24/20.Please call to advise Thanks    Communication: [phone]

## 2020-11-21 ENCOUNTER — LAB VISIT (OUTPATIENT)
Dept: INTERNAL MEDICINE | Facility: CLINIC | Age: 65
End: 2020-11-21
Payer: MEDICARE

## 2020-11-21 DIAGNOSIS — H25.12 NUCLEAR SCLEROTIC CATARACT OF LEFT EYE: ICD-10-CM

## 2020-11-21 DIAGNOSIS — Z13.9 SCREENING PROCEDURE: ICD-10-CM

## 2020-11-21 LAB — SARS-COV-2 RNA RESP QL NAA+PROBE: NOT DETECTED

## 2020-11-21 PROCEDURE — U0003 INFECTIOUS AGENT DETECTION BY NUCLEIC ACID (DNA OR RNA); SEVERE ACUTE RESPIRATORY SYNDROME CORONAVIRUS 2 (SARS-COV-2) (CORONAVIRUS DISEASE [COVID-19]), AMPLIFIED PROBE TECHNIQUE, MAKING USE OF HIGH THROUGHPUT TECHNOLOGIES AS DESCRIBED BY CMS-2020-01-R: HCPCS

## 2020-11-21 NOTE — H&P
[facility]  History & Physical    Subjective:      Chief Complaint/Reason for Admission:     Facundo Lord Jr. is a 65 y.o. male.    Past Medical History:   Diagnosis Date    AICD (automatic cardioverter/defibrillator) present     Anticoagulant long-term use     Cancer     CHF (congestive heart failure)     COPD (chronic obstructive pulmonary disease)     Coronary artery disease     Emphysema of lung     Hypertension     Rheumatoid arthritis      Past Surgical History:   Procedure Laterality Date    anal fistula excision  2018    bladder cancer surgeruy Bilateral     CARDIAC DEFIBRILLATOR PLACEMENT      excision mass buttocks  2018    HIP ARTHROPLASTY Right 2018    Procedure: ARTHROPLASTY, HIP;  Surgeon: Jorge Luis Alejandra MD;  Location: [facility];  Service: Orthopedics;  Laterality: Right;  OFIRMEV    JOINT REPLACEMENT      pace maker      defibrillator    RELEASE OF TENDON OF HIP Right 2018    Procedure: RELEASE, TENDON, FLEXOR, HIP, OR HIP ADDUCTOR TENOTOMY;  Surgeon: Jorge Luis Alejandra MD;  Location: [facility];  Service: Orthopedics;  Laterality: Right;    TURBT       History reviewed. No pertinent family history.  Social History     Tobacco Use    Smoking status: Former Smoker     Packs/day: 0.50     Start date:      Quit date: 2020     Years since quittin.8    Smokeless tobacco: Never Used   Substance Use Topics    Alcohol use: Yes     Comment: Seldom    Drug use: Never       No medications prior to admission.     Review of patient's allergies indicates:   Allergen Reactions    Lisinopril Rash        Review of Systems   Eyes: Positive for blurred vision.   All other systems reviewed and are negative.      Objective:      Vital Signs (Most Recent)       Vital Signs Range (Last 24H):       Physical Exam  Constitutional:       Appearance: He is well-developed.   HENT:      Head: Normocephalic.   Eyes:      Conjunctiva/sclera: Conjunctivae  normal.      Pupils: Pupils are equal, round, and reactive to light.   Neck:      Musculoskeletal: Normal range of motion and neck supple.   Cardiovascular:      Rate and Rhythm: Normal rate.   Pulmonary:      Effort: Pulmonary effort is normal.      Breath sounds: Normal breath sounds.   Abdominal:      General: Bowel sounds are normal.      Palpations: Abdomen is soft.   Musculoskeletal: Normal range of motion.   Skin:     General: Skin is warm.   Neurological:      Mental Status: He is alert and oriented to person, place, and time.         Data Review:    ECG:      Assessment:      Cataract OS.    Plan:    CE OS.

## 2020-11-23 ENCOUNTER — TELEPHONE (OUTPATIENT)
Dept: OPHTHALMOLOGY | Facility: CLINIC | Age: 65
End: 2020-11-23

## 2020-11-23 DIAGNOSIS — Z13.9 SCREENING PROCEDURE: Primary | ICD-10-CM

## 2020-11-23 NOTE — TELEPHONE ENCOUNTER
Spoke with pt to confirm COVID-19 results- Not detected and to confirm cataract sx arrival time for 12:45 p.m on 11/24/2020.

## 2020-11-24 ENCOUNTER — ANESTHESIA EVENT (OUTPATIENT)
Dept: SURGERY | Facility: OTHER | Age: 65
End: 2020-11-24
Payer: MEDICARE

## 2020-11-24 ENCOUNTER — HOSPITAL ENCOUNTER (OUTPATIENT)
Facility: OTHER | Age: 65
Discharge: HOME | End: 2020-11-24
Attending: OPHTHALMOLOGY | Admitting: OPHTHALMOLOGY
Payer: MEDICARE

## 2020-11-24 ENCOUNTER — ANESTHESIA (OUTPATIENT)
Dept: SURGERY | Facility: OTHER | Age: 65
End: 2020-11-24
Payer: MEDICARE

## 2020-11-24 VITALS
TEMPERATURE: 97 F | RESPIRATION RATE: 16 BRPM | OXYGEN SATURATION: 95 % | HEART RATE: 64 BPM | SYSTOLIC BLOOD PRESSURE: 165 MMHG | HEIGHT: 72 IN | BODY MASS INDEX: 42.66 KG/M2 | DIASTOLIC BLOOD PRESSURE: 72 MMHG | WEIGHT: 315 LBS

## 2020-11-24 DIAGNOSIS — H25.12 NUCLEAR SCLEROTIC CATARACT OF LEFT EYE: Primary | ICD-10-CM

## 2020-11-24 PROCEDURE — 63600175 PHARM REV CODE 636 W HCPCS: Performed by: NURSE ANESTHETIST, CERTIFIED REGISTERED

## 2020-11-24 PROCEDURE — 37000009 HC ANESTHESIA EA ADD 15 MINS: Performed by: OPHTHALMOLOGY

## 2020-11-24 PROCEDURE — 36000707: Performed by: OPHTHALMOLOGY

## 2020-11-24 PROCEDURE — 36000706: Performed by: OPHTHALMOLOGY

## 2020-11-24 PROCEDURE — 63600175 PHARM REV CODE 636 W HCPCS: Performed by: OPHTHALMOLOGY

## 2020-11-24 PROCEDURE — V2632 POST CHMBR INTRAOCULAR LENS: HCPCS | Performed by: OPHTHALMOLOGY

## 2020-11-24 PROCEDURE — 25000003 PHARM REV CODE 250: Performed by: OPHTHALMOLOGY

## 2020-11-24 PROCEDURE — 66982 XCAPSL CTRC RMVL CPLX WO ECP: CPT | Mod: LT,,, | Performed by: OPHTHALMOLOGY

## 2020-11-24 PROCEDURE — 66982 PR REMOVAL, CATARACT, W/INSRT INTRAOC LENS, W/O ENDO CYCLO, CMPLX: ICD-10-PCS | Mod: LT,,, | Performed by: OPHTHALMOLOGY

## 2020-11-24 PROCEDURE — 37000008 HC ANESTHESIA 1ST 15 MINUTES: Performed by: OPHTHALMOLOGY

## 2020-11-24 PROCEDURE — 71000015 HC POSTOP RECOV 1ST HR: Performed by: OPHTHALMOLOGY

## 2020-11-24 DEVICE — LENS 22.0: Type: IMPLANTABLE DEVICE | Site: EYE | Status: FUNCTIONAL

## 2020-11-24 RX ORDER — FENTANYL CITRATE 50 UG/ML
INJECTION, SOLUTION INTRAMUSCULAR; INTRAVENOUS
Status: DISCONTINUED | OUTPATIENT
Start: 2020-11-24 | End: 2020-11-24

## 2020-11-24 RX ORDER — ACETAMINOPHEN 325 MG/1
650 TABLET ORAL EVERY 4 HOURS PRN
Status: DISCONTINUED | OUTPATIENT
Start: 2020-11-24 | End: 2020-11-24 | Stop reason: HOSPADM

## 2020-11-24 RX ORDER — PREDNISOLONE ACETATE 10 MG/ML
SUSPENSION/ DROPS OPHTHALMIC
Status: DISCONTINUED | OUTPATIENT
Start: 2020-11-24 | End: 2020-11-24 | Stop reason: HOSPADM

## 2020-11-24 RX ORDER — HYDROCODONE BITARTRATE AND ACETAMINOPHEN 5; 325 MG/1; MG/1
1 TABLET ORAL EVERY 4 HOURS PRN
Status: CANCELLED | OUTPATIENT
Start: 2020-11-24

## 2020-11-24 RX ORDER — TROPICAMIDE 10 MG/ML
1 SOLUTION/ DROPS OPHTHALMIC
Status: COMPLETED | OUTPATIENT
Start: 2020-11-24 | End: 2020-11-24

## 2020-11-24 RX ORDER — HYDROXYCHLOROQUINE SULFATE 200 MG/1
200 TABLET, FILM COATED ORAL 2 TIMES DAILY
COMMUNITY

## 2020-11-24 RX ORDER — MIDAZOLAM HYDROCHLORIDE 1 MG/ML
INJECTION INTRAMUSCULAR; INTRAVENOUS
Status: DISCONTINUED | OUTPATIENT
Start: 2020-11-24 | End: 2020-11-24

## 2020-11-24 RX ORDER — SODIUM CHLORIDE 0.9 % (FLUSH) 0.9 %
10 SYRINGE (ML) INJECTION
Status: DISCONTINUED | OUTPATIENT
Start: 2020-11-24 | End: 2020-11-24 | Stop reason: HOSPADM

## 2020-11-24 RX ORDER — LIDOCAINE HYDROCHLORIDE 40 MG/ML
INJECTION, SOLUTION RETROBULBAR
Status: DISCONTINUED | OUTPATIENT
Start: 2020-11-24 | End: 2020-11-24 | Stop reason: HOSPADM

## 2020-11-24 RX ORDER — PHENYLEPHRINE HYDROCHLORIDE 25 MG/ML
1 SOLUTION/ DROPS OPHTHALMIC
Status: COMPLETED | OUTPATIENT
Start: 2020-11-24 | End: 2020-11-24

## 2020-11-24 RX ORDER — EPINEPHRINE 1 MG/ML
INJECTION, SOLUTION INTRACARDIAC; INTRAMUSCULAR; INTRAVENOUS; SUBCUTANEOUS
Status: DISCONTINUED | OUTPATIENT
Start: 2020-11-24 | End: 2020-11-24 | Stop reason: HOSPADM

## 2020-11-24 RX ORDER — CYCLOPENTOLATE HYDROCHLORIDE 10 MG/ML
1 SOLUTION/ DROPS OPHTHALMIC
Status: DISCONTINUED | OUTPATIENT
Start: 2020-11-24 | End: 2020-11-24 | Stop reason: HOSPADM

## 2020-11-24 RX ORDER — OFLOXACIN 3 MG/ML
1 SOLUTION/ DROPS OPHTHALMIC
Status: DISCONTINUED | OUTPATIENT
Start: 2020-11-24 | End: 2020-11-24 | Stop reason: HOSPADM

## 2020-11-24 RX ORDER — TETRACAINE HYDROCHLORIDE 5 MG/ML
1 SOLUTION OPHTHALMIC
Status: COMPLETED | OUTPATIENT
Start: 2020-11-24 | End: 2020-11-24

## 2020-11-24 RX ADMIN — TETRACAINE HYDROCHLORIDE 1 DROP: 5 SOLUTION OPHTHALMIC at 01:11

## 2020-11-24 RX ADMIN — TROPICAMIDE 1 DROP: 10 SOLUTION/ DROPS OPHTHALMIC at 01:11

## 2020-11-24 RX ADMIN — CYCLOPENTOLATE HYDROCHLORIDE 1 DROP: 10 SOLUTION/ DROPS OPHTHALMIC at 01:11

## 2020-11-24 RX ADMIN — PHENYLEPHRINE HYDROCHLORIDE 1 DROP: 25 SOLUTION/ DROPS OPHTHALMIC at 01:11

## 2020-11-24 RX ADMIN — FENTANYL CITRATE 25 MCG: 50 INJECTION, SOLUTION INTRAMUSCULAR; INTRAVENOUS at 03:11

## 2020-11-24 RX ADMIN — OFLOXACIN 1 DROP: 3 SOLUTION OPHTHALMIC at 01:11

## 2020-11-24 RX ADMIN — MIDAZOLAM HYDROCHLORIDE 1 MG: 1 INJECTION, SOLUTION INTRAMUSCULAR; INTRAVENOUS at 03:11

## 2020-11-24 RX ADMIN — MIDAZOLAM HYDROCHLORIDE 2 MG: 1 INJECTION, SOLUTION INTRAMUSCULAR; INTRAVENOUS at 02:11

## 2020-11-24 NOTE — ANESTHESIA POSTPROCEDURE EVALUATION
Anesthesia Post Evaluation    Patient: Facundo Lord Jr.    Procedure(s) Performed: Procedure(s) (LRB):  EXTRACTION, CATARACT, WITH IOL INSERTION (Left)    Final Anesthesia Type: MAC    Patient location during evaluation: [facility]  Patient participation: Yes- Able to Participate  Level of consciousness: awake and alert  Post-procedure vital signs: reviewed and stable  Pain management: adequate  Airway patency: patent    PONV status at discharge: No PONV  Anesthetic complications: no      Cardiovascular status: blood pressure returned to baseline  Respiratory status: unassisted  Hydration status: euvolemic  Follow-up not needed.          Vitals Value Taken Time   /74 11/24/20 1259   Temp 36.3 °C (97.3 °F) 11/24/20 1259   Pulse 62 11/24/20 1259   Resp 18 11/24/20 1259   SpO2 98 % 11/24/20 1259         No case tracking events are documented in the log.      Pain/Carla Score: No data recorded

## 2020-11-24 NOTE — OP NOTE
Operative Date:  11/24/2020    Discharge Date:  11/24/2020    Discharge Patient Home          Report Title: Operative Note      SURGEON: Hans Hutson MD     ASSISTANT:     PREOPERATIVE DIAGNOSIS: Visually significant NSC cataract,  Left Eye    POSTOPERATIVE DIAGNOSIS: Visually-significant NSC cataract,  Left Eye    PROCEDURE PERFORMED: Complex Phacoemulsification of the cataract with posterior chamber intraocular lens  & Trypan Blue Assisted Capsulotomy Left Eye    ANESTHESIA: Topical with MAC     COMPLICATIONS: None    ESTIMATED BLOOD LOSS: Minimal    INDICATIONS FOR PROCEDURE:   The patient is a pleasant 65 year old gentleman  with increasing difficulties with activities of daily living secondary to a dense visually significant cataract in the Left Eye.  Discussions have been carried out with this patient concerning the options to surgery, risks, benefits and expectations.  The patient voiced good understanding and wished to proceed with the above procedure.    PROCEDURE IN DETAIL: The patient was brought to the operating room and received topical anesthetic to the eye and then was prepped and draped in the usual sterile fashion.  Using the Snyder ring and the 0.37 mm gerard blade, a partial thickness clear cornea incision was made temporally.  A paracentesis site was made at the six o'clock position. Due to the density of the cataract and poor visualization of the red reflex, it was decided to use Trypan blue. An air bubble was injected into the anterior chamber.Trypan blue was then used to stain the anterior capsule followed by BSS washout. Viscoat was injected into the anterior chamber.  The eye was then reentered at the primary surgical site with a 2.4 mm keratome followed by continuous capsulotomy, hydrodissection, hydrodelineation and phacoemulsification of the cataract.  Residual cortical material was removed using automated irrigation-aspiration technique.  Healon was injected into the  posterior chamber and an SN60WF 22.0 diopter lens was placed in the bag without difficulty.  Residual viscoelastic was removed using automated irrigation-aspiration technique. The eye was re-pressurized using BSS solution and both the paracentesis site and the primary surgical site were demonstrated to be watertight at the end of the case with Weck--Marta manipulation. One drop of Ofloxacin and 1 drop of Pred acetate 1% was applied to the Left Eye .The eye was closed, patched and a Rosenberg shield placed.  The patient was taken to the recovery room in good and stable condition.  The patient tolerated the procedure well and was discharged Home.  The patient was instructed to refrain from any heavy lifting, bending, stooping or straining activities and to follow-up in the morning for routine postoperative care with Hans Hutson MD

## 2020-11-24 NOTE — ANESTHESIA PREPROCEDURE EVALUATION
11/24/2020  Facundo Lord Jr. is a 65 y.o., male.    Anesthesia Evaluation    I have reviewed the Patient Summary Reports.    I have reviewed the Nursing Notes. I have reviewed the NPO Status.   I have reviewed the Medications.     Review of Systems  Anesthesia Hx:  No problems with previous Anesthesia  Denies Family Hx of Anesthesia complications.   Denies Personal Hx of Anesthesia complications.   Social:  Smoker, Former Smoker Quit 1/16/20   Hematology/Oncology:  Hematology Normal   Oncology Normal     EENT/Dental:EENT/Dental Normal   Cardiovascular:   Exercise tolerance: poor Hypertension CAD   CHF AICD   Pulmonary:   Pneumonia COPD, moderate Shortness of breath    Renal/:  Renal/ Normal     Musculoskeletal:  Musculoskeletal Normal  Musculoskeletal General/Symptoms:  Joint Disease:  Arthritis, Rheumatoid Arthritis    Neurological:  Neurology Normal  Rheumatoid Arthritis    Endocrine:  Endocrine Normal    Dermatological:  Skin Normal    Psych:  Psychiatric Normal           Physical Exam  General:  Morbid Obesity    Airway/Jaw/Neck:  Airway Findings: Mouth Opening: Normal Tongue: Normal  General Airway Assessment: Adult  Mallampati: I  TM Distance: Normal, at least 6 cm  Jaw/Neck Findings:  Neck ROM: Normal ROM      Dental:  Dental Findings: In tact             Anesthesia Plan  Type of Anesthesia, risks & benefits discussed:  Anesthesia Type:  MAC  Patient's Preference:   Intra-op Monitoring Plan: standard ASA monitors  Intra-op Monitoring Plan Comments:   Post Op Pain Control Plan: per primary service following discharge from PACU  Post Op Pain Control Plan Comments:   Induction:    Beta Blocker:         Informed Consent: Patient understands risks and agrees with Anesthesia plan.  Questions answered. Anesthesia consent signed with patient.  ASA Score: 3     Day of Surgery Review of History &  Physical:    H&P update referred to the surgeon.         Ready For Surgery From Anesthesia Perspective.

## 2020-11-25 ENCOUNTER — OFFICE VISIT (OUTPATIENT)
Dept: OPHTHALMOLOGY | Facility: CLINIC | Age: 65
End: 2020-11-25
Payer: MEDICARE

## 2020-11-25 VITALS — HEART RATE: 83 BPM | SYSTOLIC BLOOD PRESSURE: 184 MMHG | DIASTOLIC BLOOD PRESSURE: 102 MMHG

## 2020-11-25 DIAGNOSIS — Z98.890 POST-OPERATIVE STATE: Primary | ICD-10-CM

## 2020-11-25 PROCEDURE — 99999 PR PBB SHADOW E&M-EST. PATIENT-LVL III: ICD-10-PCS | Mod: PBBFAC,,, | Performed by: OPHTHALMOLOGY

## 2020-11-25 PROCEDURE — 99999 PR PBB SHADOW E&M-EST. PATIENT-LVL III: CPT | Mod: PBBFAC,,, | Performed by: OPHTHALMOLOGY

## 2020-11-25 PROCEDURE — 99024 PR POST-OP FOLLOW-UP VISIT: ICD-10-PCS | Mod: S$GLB,,, | Performed by: OPHTHALMOLOGY

## 2020-11-25 PROCEDURE — 99024 POSTOP FOLLOW-UP VISIT: CPT | Mod: S$GLB,,, | Performed by: OPHTHALMOLOGY

## 2020-11-25 PROCEDURE — 1126F PR PAIN SEVERITY QUANTIFIED, NO PAIN PRESENT: ICD-10-PCS | Mod: S$GLB,,, | Performed by: OPHTHALMOLOGY

## 2020-11-25 PROCEDURE — 1126F AMNT PAIN NOTED NONE PRSNT: CPT | Mod: S$GLB,,, | Performed by: OPHTHALMOLOGY

## 2020-11-26 NOTE — PROGRESS NOTES
Subjective:       Patient ID: Facundo Lord Jr. is a 65 y.o. male.    Chief Complaint: Post-op Evaluation    HPI     S/p Phaco w/IOL OS- 11/24/2020     64 y/o male is here for 1 day post op of the LT eye. Pt states sx went   well. Pt denies pain and discomfort. Pt r/s CE  OD to 1/26/2021.     Eyemed  Ofloxacin/PF/Ket QID OS     Last edited by Devin Irene on 11/25/2020 10:50 AM. (History)             Assessment:       1. Post-operative state        Plan:       S/p Complex CE OS with Trypan blue- Doing well.           CPM OS.  RTC 1 wk.

## 2020-12-02 ENCOUNTER — OFFICE VISIT (OUTPATIENT)
Dept: OPHTHALMOLOGY | Facility: CLINIC | Age: 65
End: 2020-12-02
Payer: MEDICARE

## 2020-12-02 DIAGNOSIS — Z98.890 POST-OPERATIVE STATE: Primary | ICD-10-CM

## 2020-12-02 PROCEDURE — 99024 POSTOP FOLLOW-UP VISIT: CPT | Mod: S$GLB,,, | Performed by: OPHTHALMOLOGY

## 2020-12-02 PROCEDURE — 99999 PR PBB SHADOW E&M-EST. PATIENT-LVL III: CPT | Mod: PBBFAC,,, | Performed by: OPHTHALMOLOGY

## 2020-12-02 PROCEDURE — 99999 PR PBB SHADOW E&M-EST. PATIENT-LVL III: ICD-10-PCS | Mod: PBBFAC,,, | Performed by: OPHTHALMOLOGY

## 2020-12-02 PROCEDURE — 99024 PR POST-OP FOLLOW-UP VISIT: ICD-10-PCS | Mod: S$GLB,,, | Performed by: OPHTHALMOLOGY

## 2020-12-02 PROCEDURE — 1126F PR PAIN SEVERITY QUANTIFIED, NO PAIN PRESENT: ICD-10-PCS | Mod: S$GLB,,, | Performed by: OPHTHALMOLOGY

## 2020-12-02 PROCEDURE — 1126F AMNT PAIN NOTED NONE PRSNT: CPT | Mod: S$GLB,,, | Performed by: OPHTHALMOLOGY

## 2020-12-03 NOTE — PROGRESS NOTES
Subjective:       Patient ID: Facundo Lord Jr. is a 65 y.o. male.    Chief Complaint: Post-op Evaluation    HPI     S/p Phaco w/IOL OS- 11/24/2020    64 y/o male is here for 1 week post op of the LT eye and pre-op of the RT   eye- 12/8/2020. Pt reports vision is doing well. Denies pain and   discomfort.     Eyemeds  PF/Ket TID OS    Last edited by Devin Irene on 12/2/2020  2:03 PM. (History)             Assessment:       1. Post-operative state        Plan:       S/p CE OS- Doing well.         Taper gtts OS.  RTC 3 wks.

## 2020-12-22 ENCOUNTER — TELEPHONE (OUTPATIENT)
Dept: SMOKING CESSATION | Facility: CLINIC | Age: 65
End: 2020-12-22

## 2020-12-23 ENCOUNTER — OFFICE VISIT (OUTPATIENT)
Dept: OPHTHALMOLOGY | Facility: CLINIC | Age: 65
End: 2020-12-23
Payer: MEDICARE

## 2020-12-23 DIAGNOSIS — H25.11 NS (NUCLEAR SCLEROSIS), RIGHT: ICD-10-CM

## 2020-12-23 DIAGNOSIS — Z98.890 POST-OPERATIVE STATE: Primary | ICD-10-CM

## 2020-12-23 PROCEDURE — 99024 POSTOP FOLLOW-UP VISIT: CPT | Mod: S$GLB,,, | Performed by: OPHTHALMOLOGY

## 2020-12-23 PROCEDURE — 99999 PR PBB SHADOW E&M-EST. PATIENT-LVL III: ICD-10-PCS | Mod: PBBFAC,,, | Performed by: OPHTHALMOLOGY

## 2020-12-23 PROCEDURE — 1126F AMNT PAIN NOTED NONE PRSNT: CPT | Mod: S$GLB,,, | Performed by: OPHTHALMOLOGY

## 2020-12-23 PROCEDURE — 92136 OPHTHALMIC BIOMETRY: CPT | Mod: 26,RT,S$GLB, | Performed by: OPHTHALMOLOGY

## 2020-12-23 PROCEDURE — 99999 PR PBB SHADOW E&M-EST. PATIENT-LVL III: CPT | Mod: PBBFAC,,, | Performed by: OPHTHALMOLOGY

## 2020-12-23 PROCEDURE — 1126F PR PAIN SEVERITY QUANTIFIED, NO PAIN PRESENT: ICD-10-PCS | Mod: S$GLB,,, | Performed by: OPHTHALMOLOGY

## 2020-12-23 PROCEDURE — 99024 PR POST-OP FOLLOW-UP VISIT: ICD-10-PCS | Mod: S$GLB,,, | Performed by: OPHTHALMOLOGY

## 2020-12-23 PROCEDURE — 92136 PR OPHTHAL BIOMETRY,INTRAOC LENS POW CALC: ICD-10-PCS | Mod: 26,RT,S$GLB, | Performed by: OPHTHALMOLOGY

## 2020-12-23 NOTE — PROGRESS NOTES
Subjective:       Patient ID: Facundo Lord Jr. is a 65 y.o. male.    Chief Complaint: No chief complaint on file.    HPI     S/p Phaco w/IOL OS- 11/24/2020     66 y/o male is here for 3 week post op of the LT eye and pre-op of the RT   eye 01/26/2021. Pt states he had slight dull eye pain on yesterday.     Eyemeds:  None-pt reports finished.       Last edited by Zelda Pabon on 12/23/2020  8:10 AM. (History)             Assessment:       1. Post-operative state    2. NS (nuclear sclerosis), right        Plan:       S/p CE OS- Doing well.     Visually significant cataract OD -Pt. Wants Sx.      Cataract Surgery Consent: Patient with a visually significant cataract with difficulties of ADLs, reading, driving, night vision, glare (any and all).  Discussed with Patient/Family/Caregiver: options, risks and benefits, expectations of cataract surgery, utilized an eye model with questions and answers to facilitate discussion.  Discussed lens options and patient understands that glasses may be required for optimal vision for distance and/or near vision after cataract surgery.  The Patient/Family/Caregiver  voice good understanding and patient wishes to proceed with surgery.  The patient will likely benefit from surgery and patient signed consent for Right Eye.  CE OD 1/26/2021.

## 2021-01-20 ENCOUNTER — TELEPHONE (OUTPATIENT)
Dept: SMOKING CESSATION | Facility: CLINIC | Age: 66
End: 2021-01-20

## 2021-01-22 ENCOUNTER — TELEPHONE (OUTPATIENT)
Dept: OPHTHALMOLOGY | Facility: CLINIC | Age: 66
End: 2021-01-22

## 2021-01-23 ENCOUNTER — NURSE TRIAGE (OUTPATIENT)
Dept: ADMINISTRATIVE | Facility: CLINIC | Age: 66
End: 2021-01-23

## 2021-01-23 ENCOUNTER — LAB VISIT (OUTPATIENT)
Dept: INTERNAL MEDICINE | Facility: CLINIC | Age: 66
End: 2021-01-23
Payer: MEDICARE

## 2021-01-23 DIAGNOSIS — Z13.9 SCREENING PROCEDURE: ICD-10-CM

## 2021-01-23 PROCEDURE — U0003 INFECTIOUS AGENT DETECTION BY NUCLEIC ACID (DNA OR RNA); SEVERE ACUTE RESPIRATORY SYNDROME CORONAVIRUS 2 (SARS-COV-2) (CORONAVIRUS DISEASE [COVID-19]), AMPLIFIED PROBE TECHNIQUE, MAKING USE OF HIGH THROUGHPUT TECHNOLOGIES AS DESCRIBED BY CMS-2020-01-R: HCPCS

## 2021-01-24 LAB — SARS-COV-2 RNA RESP QL NAA+PROBE: NOT DETECTED

## 2021-01-25 ENCOUNTER — TELEPHONE (OUTPATIENT)
Dept: OPHTHALMOLOGY | Facility: CLINIC | Age: 66
End: 2021-01-25

## 2021-01-26 ENCOUNTER — HOSPITAL ENCOUNTER (OUTPATIENT)
Facility: OTHER | Age: 66
Discharge: HOME | End: 2021-01-26
Attending: OPHTHALMOLOGY | Admitting: OPHTHALMOLOGY
Payer: MEDICARE

## 2021-01-26 ENCOUNTER — ANESTHESIA EVENT (OUTPATIENT)
Dept: SURGERY | Facility: OTHER | Age: 66
End: 2021-01-26
Payer: MEDICARE

## 2021-01-26 ENCOUNTER — ANESTHESIA (OUTPATIENT)
Dept: SURGERY | Facility: OTHER | Age: 66
End: 2021-01-26
Payer: MEDICARE

## 2021-01-26 VITALS
HEIGHT: 72 IN | TEMPERATURE: 97 F | RESPIRATION RATE: 18 BRPM | DIASTOLIC BLOOD PRESSURE: 79 MMHG | WEIGHT: 315 LBS | HEART RATE: 64 BPM | OXYGEN SATURATION: 96 % | BODY MASS INDEX: 42.66 KG/M2 | SYSTOLIC BLOOD PRESSURE: 139 MMHG

## 2021-01-26 DIAGNOSIS — H25.11 NUCLEAR SCLEROTIC CATARACT OF RIGHT EYE: Primary | ICD-10-CM

## 2021-01-26 PROCEDURE — 25000003 PHARM REV CODE 250: Performed by: OPHTHALMOLOGY

## 2021-01-26 PROCEDURE — 37000008 HC ANESTHESIA 1ST 15 MINUTES: Performed by: OPHTHALMOLOGY

## 2021-01-26 PROCEDURE — 63600175 PHARM REV CODE 636 W HCPCS

## 2021-01-26 PROCEDURE — 63600175 PHARM REV CODE 636 W HCPCS: Performed by: NURSE ANESTHETIST, CERTIFIED REGISTERED

## 2021-01-26 PROCEDURE — 63600175 PHARM REV CODE 636 W HCPCS: Performed by: OPHTHALMOLOGY

## 2021-01-26 PROCEDURE — 36000707: Performed by: OPHTHALMOLOGY

## 2021-01-26 PROCEDURE — 37000009 HC ANESTHESIA EA ADD 15 MINS: Performed by: OPHTHALMOLOGY

## 2021-01-26 PROCEDURE — 66984 XCAPSL CTRC RMVL W/O ECP: CPT | Mod: 79,RT,, | Performed by: OPHTHALMOLOGY

## 2021-01-26 PROCEDURE — 36000706: Performed by: OPHTHALMOLOGY

## 2021-01-26 PROCEDURE — V2632 POST CHMBR INTRAOCULAR LENS: HCPCS | Performed by: OPHTHALMOLOGY

## 2021-01-26 PROCEDURE — 66984 PR REMOVAL, CATARACT, W/INSRT INTRAOC LENS, W/O ENDO CYCLO: ICD-10-PCS | Mod: 79,RT,, | Performed by: OPHTHALMOLOGY

## 2021-01-26 PROCEDURE — 71000033 HC RECOVERY, INTIAL HOUR: Performed by: OPHTHALMOLOGY

## 2021-01-26 DEVICE — LENS 23.0: Type: IMPLANTABLE DEVICE | Site: EYE | Status: FUNCTIONAL

## 2021-01-26 RX ORDER — PREDNISOLONE ACETATE 10 MG/ML
SUSPENSION/ DROPS OPHTHALMIC
Status: DISCONTINUED | OUTPATIENT
Start: 2021-01-26 | End: 2021-01-26 | Stop reason: HOSPADM

## 2021-01-26 RX ORDER — HYDROCODONE BITARTRATE AND ACETAMINOPHEN 5; 325 MG/1; MG/1
1 TABLET ORAL EVERY 4 HOURS PRN
Status: CANCELLED | OUTPATIENT
Start: 2021-01-26

## 2021-01-26 RX ORDER — EPINEPHRINE 1 MG/ML
INJECTION, SOLUTION INTRACARDIAC; INTRAMUSCULAR; INTRAVENOUS; SUBCUTANEOUS
Status: DISCONTINUED | OUTPATIENT
Start: 2021-01-26 | End: 2021-01-26 | Stop reason: HOSPADM

## 2021-01-26 RX ORDER — FENTANYL CITRATE 50 UG/ML
INJECTION, SOLUTION INTRAMUSCULAR; INTRAVENOUS
Status: DISCONTINUED | OUTPATIENT
Start: 2021-01-26 | End: 2021-01-26

## 2021-01-26 RX ORDER — MIDAZOLAM HYDROCHLORIDE 1 MG/ML
INJECTION INTRAMUSCULAR; INTRAVENOUS
Status: DISCONTINUED | OUTPATIENT
Start: 2021-01-26 | End: 2021-01-26

## 2021-01-26 RX ORDER — TETRACAINE HYDROCHLORIDE 5 MG/ML
1 SOLUTION OPHTHALMIC
Status: COMPLETED | OUTPATIENT
Start: 2021-01-26 | End: 2021-01-26

## 2021-01-26 RX ORDER — CYCLOPENTOLATE HYDROCHLORIDE 10 MG/ML
1 SOLUTION/ DROPS OPHTHALMIC
Status: DISPENSED | OUTPATIENT
Start: 2021-01-26

## 2021-01-26 RX ORDER — PHENYLEPHRINE HYDROCHLORIDE 25 MG/ML
1 SOLUTION/ DROPS OPHTHALMIC
Status: COMPLETED | OUTPATIENT
Start: 2021-01-26 | End: 2021-01-26

## 2021-01-26 RX ORDER — LIDOCAINE HYDROCHLORIDE 40 MG/ML
INJECTION, SOLUTION RETROBULBAR
Status: DISCONTINUED | OUTPATIENT
Start: 2021-01-26 | End: 2021-01-26 | Stop reason: HOSPADM

## 2021-01-26 RX ORDER — ACETAMINOPHEN 325 MG/1
650 TABLET ORAL EVERY 4 HOURS PRN
Status: DISCONTINUED | OUTPATIENT
Start: 2021-01-26 | End: 2021-01-26 | Stop reason: HOSPADM

## 2021-01-26 RX ORDER — SODIUM CHLORIDE 0.9 % (FLUSH) 0.9 %
10 SYRINGE (ML) INJECTION
Status: ACTIVE | OUTPATIENT
Start: 2021-01-26

## 2021-01-26 RX ORDER — TROPICAMIDE 10 MG/ML
1 SOLUTION/ DROPS OPHTHALMIC
Status: COMPLETED | OUTPATIENT
Start: 2021-01-26 | End: 2021-01-26

## 2021-01-26 RX ORDER — OFLOXACIN 3 MG/ML
1 SOLUTION/ DROPS OPHTHALMIC
Status: DISPENSED | OUTPATIENT
Start: 2021-01-26

## 2021-01-26 RX ADMIN — TROPICAMIDE 1 DROP: 10 SOLUTION/ DROPS OPHTHALMIC at 12:01

## 2021-01-26 RX ADMIN — PHENYLEPHRINE HYDROCHLORIDE 1 DROP: 25 SOLUTION/ DROPS OPHTHALMIC at 12:01

## 2021-01-26 RX ADMIN — CYCLOPENTOLATE HYDROCHLORIDE 1 DROP: 10 SOLUTION/ DROPS OPHTHALMIC at 12:01

## 2021-01-26 RX ADMIN — MIDAZOLAM HYDROCHLORIDE 2 MG: 1 INJECTION, SOLUTION INTRAMUSCULAR; INTRAVENOUS at 01:01

## 2021-01-26 RX ADMIN — OFLOXACIN 1 DROP: 3 SOLUTION OPHTHALMIC at 12:01

## 2021-01-26 RX ADMIN — TETRACAINE HYDROCHLORIDE 1 DROP: 5 SOLUTION OPHTHALMIC at 12:01

## 2021-01-26 RX ADMIN — FENTANYL CITRATE 25 MCG: 50 INJECTION, SOLUTION INTRAMUSCULAR; INTRAVENOUS at 01:01

## 2021-01-27 ENCOUNTER — OFFICE VISIT (OUTPATIENT)
Dept: OPHTHALMOLOGY | Facility: CLINIC | Age: 66
End: 2021-01-27
Payer: MEDICARE

## 2021-01-27 VITALS — SYSTOLIC BLOOD PRESSURE: 109 MMHG | DIASTOLIC BLOOD PRESSURE: 69 MMHG | HEART RATE: 61 BPM

## 2021-01-27 DIAGNOSIS — Z98.890 POST-OPERATIVE STATE: Primary | ICD-10-CM

## 2021-01-27 PROCEDURE — 99024 POSTOP FOLLOW-UP VISIT: CPT | Mod: S$GLB,,, | Performed by: OPHTHALMOLOGY

## 2021-01-27 PROCEDURE — 99999 PR PBB SHADOW E&M-EST. PATIENT-LVL III: ICD-10-PCS | Mod: PBBFAC,,, | Performed by: OPHTHALMOLOGY

## 2021-01-27 PROCEDURE — 1126F AMNT PAIN NOTED NONE PRSNT: CPT | Mod: S$GLB,,, | Performed by: OPHTHALMOLOGY

## 2021-01-27 PROCEDURE — 99024 PR POST-OP FOLLOW-UP VISIT: ICD-10-PCS | Mod: S$GLB,,, | Performed by: OPHTHALMOLOGY

## 2021-01-27 PROCEDURE — 1126F PR PAIN SEVERITY QUANTIFIED, NO PAIN PRESENT: ICD-10-PCS | Mod: S$GLB,,, | Performed by: OPHTHALMOLOGY

## 2021-01-27 PROCEDURE — 99999 PR PBB SHADOW E&M-EST. PATIENT-LVL III: CPT | Mod: PBBFAC,,, | Performed by: OPHTHALMOLOGY

## 2021-01-27 RX ORDER — OFLOXACIN 3 MG/ML
SOLUTION/ DROPS OPHTHALMIC
COMMUNITY
Start: 2021-01-21

## 2021-01-27 RX ORDER — PREDNISOLONE ACETATE 10 MG/ML
SUSPENSION/ DROPS OPHTHALMIC
COMMUNITY
Start: 2021-01-21

## 2021-02-04 ENCOUNTER — TELEPHONE (OUTPATIENT)
Dept: SMOKING CESSATION | Facility: CLINIC | Age: 66
End: 2021-02-04

## 2021-02-10 ENCOUNTER — TELEPHONE (OUTPATIENT)
Dept: OPHTHALMOLOGY | Facility: CLINIC | Age: 66
End: 2021-02-10

## 2021-02-12 ENCOUNTER — PATIENT MESSAGE (OUTPATIENT)
Dept: RHEUMATOLOGY | Facility: CLINIC | Age: 66
End: 2021-02-12

## 2021-02-24 ENCOUNTER — OFFICE VISIT (OUTPATIENT)
Dept: OPHTHALMOLOGY | Facility: CLINIC | Age: 66
End: 2021-02-24
Payer: MEDICARE

## 2021-02-24 DIAGNOSIS — Z98.890 POST-OPERATIVE STATE: Primary | ICD-10-CM

## 2021-02-24 DIAGNOSIS — H52.7 REFRACTIVE ERROR: ICD-10-CM

## 2021-02-24 PROCEDURE — 99024 POSTOP FOLLOW-UP VISIT: CPT | Mod: S$GLB,,, | Performed by: OPHTHALMOLOGY

## 2021-02-24 PROCEDURE — 1126F AMNT PAIN NOTED NONE PRSNT: CPT | Mod: S$GLB,,, | Performed by: OPHTHALMOLOGY

## 2021-02-24 PROCEDURE — 99999 PR PBB SHADOW E&M-EST. PATIENT-LVL III: ICD-10-PCS | Mod: PBBFAC,,, | Performed by: OPHTHALMOLOGY

## 2021-02-24 PROCEDURE — 1126F PR PAIN SEVERITY QUANTIFIED, NO PAIN PRESENT: ICD-10-PCS | Mod: S$GLB,,, | Performed by: OPHTHALMOLOGY

## 2021-02-24 PROCEDURE — 99999 PR PBB SHADOW E&M-EST. PATIENT-LVL III: CPT | Mod: PBBFAC,,, | Performed by: OPHTHALMOLOGY

## 2021-02-24 PROCEDURE — 99024 PR POST-OP FOLLOW-UP VISIT: ICD-10-PCS | Mod: S$GLB,,, | Performed by: OPHTHALMOLOGY

## 2021-04-15 ENCOUNTER — PATIENT MESSAGE (OUTPATIENT)
Dept: RESEARCH | Facility: HOSPITAL | Age: 66
End: 2021-04-15

## 2021-04-28 ENCOUNTER — TELEPHONE (OUTPATIENT)
Dept: SMOKING CESSATION | Facility: CLINIC | Age: 66
End: 2021-04-28

## 2022-09-28 ENCOUNTER — CLINICAL SUPPORT (OUTPATIENT)
Dept: AUDIOLOGY | Facility: CLINIC | Age: 67
End: 2022-09-28
Payer: MEDICARE

## 2022-09-28 ENCOUNTER — OFFICE VISIT (OUTPATIENT)
Dept: OTOLARYNGOLOGY | Facility: CLINIC | Age: 67
End: 2022-09-28
Payer: MEDICARE

## 2022-09-28 VITALS — HEART RATE: 84 BPM | SYSTOLIC BLOOD PRESSURE: 132 MMHG | DIASTOLIC BLOOD PRESSURE: 84 MMHG

## 2022-09-28 DIAGNOSIS — H90.3 SENSORINEURAL HEARING LOSS (SNHL) OF BOTH EARS: Primary | ICD-10-CM

## 2022-09-28 DIAGNOSIS — H90.6 MIXED CONDUCTIVE AND SENSORINEURAL HEARING LOSS OF BOTH EARS: ICD-10-CM

## 2022-09-28 DIAGNOSIS — H61.23 BILATERAL IMPACTED CERUMEN: Primary | ICD-10-CM

## 2022-09-28 PROCEDURE — 1100F PR PT FALLS ASSESS DOC 2+ FALLS/FALL W/INJURY/YR: ICD-10-PCS | Mod: CPTII,S$GLB,, | Performed by: NURSE PRACTITIONER

## 2022-09-28 PROCEDURE — 1159F PR MEDICATION LIST DOCUMENTED IN MEDICAL RECORD: ICD-10-PCS | Mod: CPTII,S$GLB,, | Performed by: NURSE PRACTITIONER

## 2022-09-28 PROCEDURE — 69210 EAR CERUMEN REMOVAL: ICD-10-PCS | Mod: S$GLB,,, | Performed by: NURSE PRACTITIONER

## 2022-09-28 PROCEDURE — 1126F AMNT PAIN NOTED NONE PRSNT: CPT | Mod: CPTII,S$GLB,, | Performed by: NURSE PRACTITIONER

## 2022-09-28 PROCEDURE — 3079F DIAST BP 80-89 MM HG: CPT | Mod: CPTII,S$GLB,, | Performed by: NURSE PRACTITIONER

## 2022-09-28 PROCEDURE — 3075F SYST BP GE 130 - 139MM HG: CPT | Mod: CPTII,S$GLB,, | Performed by: NURSE PRACTITIONER

## 2022-09-28 PROCEDURE — 3079F PR MOST RECENT DIASTOLIC BLOOD PRESSURE 80-89 MM HG: ICD-10-PCS | Mod: CPTII,S$GLB,, | Performed by: NURSE PRACTITIONER

## 2022-09-28 PROCEDURE — 3288F FALL RISK ASSESSMENT DOCD: CPT | Mod: CPTII,S$GLB,, | Performed by: NURSE PRACTITIONER

## 2022-09-28 PROCEDURE — 99203 PR OFFICE/OUTPT VISIT, NEW, LEVL III, 30-44 MIN: ICD-10-PCS | Mod: 25,S$GLB,, | Performed by: NURSE PRACTITIONER

## 2022-09-28 PROCEDURE — 1100F PTFALLS ASSESS-DOCD GE2>/YR: CPT | Mod: CPTII,S$GLB,, | Performed by: NURSE PRACTITIONER

## 2022-09-28 PROCEDURE — 99203 OFFICE O/P NEW LOW 30 MIN: CPT | Mod: 25,S$GLB,, | Performed by: NURSE PRACTITIONER

## 2022-09-28 PROCEDURE — 69210 REMOVE IMPACTED EAR WAX UNI: CPT | Mod: S$GLB,,, | Performed by: NURSE PRACTITIONER

## 2022-09-28 PROCEDURE — 3075F PR MOST RECENT SYSTOLIC BLOOD PRESS GE 130-139MM HG: ICD-10-PCS | Mod: CPTII,S$GLB,, | Performed by: NURSE PRACTITIONER

## 2022-09-28 PROCEDURE — 92557 PR COMPREHENSIVE HEARING TEST: ICD-10-PCS | Mod: S$GLB,,,

## 2022-09-28 PROCEDURE — 99999 PR PBB SHADOW E&M-EST. PATIENT-LVL III: ICD-10-PCS | Mod: PBBFAC,,, | Performed by: NURSE PRACTITIONER

## 2022-09-28 PROCEDURE — 3288F PR FALLS RISK ASSESSMENT DOCUMENTED: ICD-10-PCS | Mod: CPTII,S$GLB,, | Performed by: NURSE PRACTITIONER

## 2022-09-28 PROCEDURE — 92567 TYMPANOMETRY: CPT | Mod: S$GLB,,,

## 2022-09-28 PROCEDURE — 1126F PR PAIN SEVERITY QUANTIFIED, NO PAIN PRESENT: ICD-10-PCS | Mod: CPTII,S$GLB,, | Performed by: NURSE PRACTITIONER

## 2022-09-28 PROCEDURE — 1159F MED LIST DOCD IN RCRD: CPT | Mod: CPTII,S$GLB,, | Performed by: NURSE PRACTITIONER

## 2022-09-28 PROCEDURE — 92557 COMPREHENSIVE HEARING TEST: CPT | Mod: S$GLB,,,

## 2022-09-28 PROCEDURE — 92567 PR TYMPA2METRY: ICD-10-PCS | Mod: S$GLB,,,

## 2022-09-28 PROCEDURE — 99999 PR PBB SHADOW E&M-EST. PATIENT-LVL III: CPT | Mod: PBBFAC,,, | Performed by: NURSE PRACTITIONER

## 2022-09-28 NOTE — PROGRESS NOTES
Facundo Lord Jr. was seen today in the clinic for an audiologic evaluation. Patient's main complaint was aural fullness and decreased hearing bilaterally. Mr. Lord reported that his ears often feel stuffy and occasionally he feels off balance. He notes that his family has commented on his need to turn the volume up on the tv/radio and that he feels he is talking louder than he used to. He denied otalgia, tinnitus, true spinning vertigo, and history of noise exposure.    Tympanometry revealed Type B with normal ECV in the right ear and Type As in the left ear.     Audiogram results revealed mild conductive hearing loss from 500-1000 Hz and mild sloping to moderate sensorineural hearing loss from 0623-5144 Hz bilaterally.      Speech reception thresholds were noted at 15 dB in the right ear and 15 dB in the left ear.    Speech discrimination scores were 100% in the right ear and 100% in the left ear.    Recommendations:  Otologic evaluation  Annual audiogram  Hearing protection when in noise  Consider hearing aid consultation pending medial clearance and patient interest

## 2022-09-28 NOTE — PROCEDURES
Ear Cerumen Removal    Date/Time: 9/28/2022 2:30 PM  Performed by: Mayela Hall NP  Authorized by: Mayela Hall NP       Local anesthetic:  None  Location details:  Both ears  Procedure type: curette    Cerumen  Removal Results:  Cerumen completely removed  Patient tolerance:  Patient tolerated the procedure well with no immediate complications     Procedure Note:    The patient was brought to the minor procedure room and placed under the operating microscope of the right ear canal which was cleaned of ceruminous debris. Using a combination of suction, curettes and cup forceps the patient's cerumen impaction was removed. The patient tolerated the procedure well. There were no complications.    Procedure Note:    The patient was brought to the minor procedure room and placed under the operating microscope of the left ear canal which was cleaned of ceruminous debris. Using a combination of suction, curettes and cup forceps the patient's cerumen impaction was removed.  The patient tolerated the procedure well. There were no complications.

## 2025-05-29 ENCOUNTER — OFFICE VISIT (OUTPATIENT)
Dept: URGENT CARE | Facility: CLINIC | Age: 70
End: 2025-05-29
Payer: MEDICARE

## 2025-05-29 VITALS
OXYGEN SATURATION: 96 % | HEART RATE: 76 BPM | TEMPERATURE: 98 F | BODY MASS INDEX: 42.66 KG/M2 | WEIGHT: 315 LBS | RESPIRATION RATE: 18 BRPM | DIASTOLIC BLOOD PRESSURE: 82 MMHG | SYSTOLIC BLOOD PRESSURE: 124 MMHG | HEIGHT: 72 IN

## 2025-05-29 DIAGNOSIS — J06.9 VIRAL UPPER RESPIRATORY TRACT INFECTION WITH COUGH: Primary | ICD-10-CM

## 2025-05-29 DIAGNOSIS — R05.9 COUGH, UNSPECIFIED TYPE: ICD-10-CM

## 2025-05-29 LAB
CTP QC/QA: YES
CTP QC/QA: YES
POC MOLECULAR INFLUENZA A AGN: NEGATIVE
POC MOLECULAR INFLUENZA B AGN: NEGATIVE
SARS-COV+SARS-COV-2 AG RESP QL IA.RAPID: NEGATIVE

## 2025-05-29 RX ORDER — IPRATROPIUM BROMIDE 21 UG/1
2 SPRAY, METERED NASAL 2 TIMES DAILY
Qty: 30 ML | Refills: 0 | Status: SHIPPED | OUTPATIENT
Start: 2025-05-29

## 2025-05-29 RX ORDER — PROMETHAZINE HYDROCHLORIDE AND DEXTROMETHORPHAN HYDROBROMIDE 6.25; 15 MG/5ML; MG/5ML
5 SYRUP ORAL EVERY 4 HOURS PRN
Qty: 473 ML | Refills: 0 | Status: SHIPPED | OUTPATIENT
Start: 2025-05-29

## 2025-05-29 NOTE — PROGRESS NOTES
Subjective:      Patient ID: Facundo Lord Jr. is a 69 y.o. male.    Vitals:  height is 6' (1.829 m) and weight is 163.3 kg (360 lb) (abnormal). His oral temperature is 98.2 °F (36.8 °C). His blood pressure is 124/82 and his pulse is 76. His respiration is 18 and oxygen saturation is 96%.     Chief Complaint: Cough    This is a 69 y.o. male who presents today with a chief complaint of cough. Symptoms started on Tuesday. Patient is also having some chest congestion.     Provider note starts below:  Patient presents to clinic for evaluation of nasal congestion, rhinorrhea, and cough which onset 2 days ago. States he recently traveled to [place]. Wife was sick with similar symptoms about 1-2 weeks ago. Patient has been taking Mucinex DM for his symptoms with mild relief. Denies fever, chills, CP, SOB, sore throat, ear pain, body aches, headache, n/v/d.      Cough  This is a new problem. The current episode started in the past 7 days. The problem has been unchanged. The problem occurs every few minutes. The cough is Productive of sputum. Pertinent negatives include no chest pain, chills, ear pain, eye redness, fever, headaches, myalgias, rash, sore throat, shortness of breath or wheezing. Nothing aggravates the symptoms. He has tried OTC cough suppressant (mucinex dm) for the symptoms. The treatment provided no relief.       Constitution: Negative for chills and fever.   HENT:  Positive for congestion. Negative for ear pain and sore throat.         +rhinorrhea   Neck: Negative for neck pain.   Cardiovascular:  Negative for chest pain and palpitations.   Eyes:  Negative for eye itching and eye redness.   Respiratory:  Positive for cough. Negative for shortness of breath and wheezing.    Gastrointestinal:  Negative for abdominal pain, nausea, vomiting and diarrhea.   Musculoskeletal:  Negative for muscle cramps and muscle ache.   Skin:  Negative for pale and rash.   Allergic/Immunologic: Negative for itching and  sneezing.   Neurological:  Negative for dizziness, light-headedness, headaches, disorientation and altered mental status.   Psychiatric/Behavioral:  Negative for altered mental status, disorientation and confusion.       Objective:     Physical Exam   Constitutional: He is oriented to person, place, and time.  Non-toxic appearance. He does not appear ill. No distress. obesity  HENT:   Head: Normocephalic and atraumatic.   Ears:   Right Ear: Tympanic membrane, external ear and ear canal normal.   Left Ear: Tympanic membrane, external ear and ear canal normal.   Nose: Rhinorrhea and congestion present.   Mouth/Throat: Mucous membranes are moist. No oropharyngeal exudate or posterior oropharyngeal erythema. Oropharynx is clear.   Eyes: Conjunctivae are normal. Extraocular movement intact   Neck: Neck supple.   Cardiovascular: Normal rate, regular rhythm, normal heart sounds and normal pulses.   No murmur heard.Exam reveals no gallop and no friction rub.   Pulmonary/Chest: Effort normal and breath sounds normal. No stridor. No respiratory distress. He has no wheezes. He has no rhonchi. He has no rales.         Comments: No acute respiratory distress. Able to speak in full sentences. No tripoding. No nasal flaring. No cyanosis. No accessory muscle use.    Abdominal: Normal appearance.   Musculoskeletal: Normal range of motion.         General: Normal range of motion.   Neurological: He is alert, oriented to person, place, and time and at baseline.   Skin: Skin is warm, dry and not diaphoretic.   Psychiatric: His behavior is normal. Mood normal.   Nursing note and vitals reviewed.    Assessment:     Results for orders placed or performed in visit on 05/29/25   SARS Coronavirus 2 Antigen, POCT Manual Read    Collection Time: 05/29/25 12:36 PM   Result Value Ref Range    SARS Coronavirus 2 Antigen Negative Negative, Presumptive Negative     Acceptable Yes    POCT Influenza A/B MOLECULAR    Collection Time:  05/29/25 12:37 PM   Result Value Ref Range    POC Molecular Influenza A Ag Negative Negative    POC Molecular Influenza B Ag Negative Negative     Acceptable Yes        1. Viral upper respiratory tract infection with cough    2. Cough, unspecified type        Plan:     Viral upper respiratory tract infection with cough  -     promethazine-dextromethorphan (PROMETHAZINE-DM) 6.25-15 mg/5 mL Syrp; Take 5 mLs by mouth every 4 (four) hours as needed (for cough).  Dispense: 473 mL; Refill: 0  -     ipratropium (ATROVENT) 21 mcg (0.03 %) nasal spray; 2 sprays by Each Nostril route 2 (two) times daily.  Dispense: 30 mL; Refill: 0    Cough, unspecified type  -     SARS Coronavirus 2 Antigen, POCT Manual Read  -     POCT Influenza A/B MOLECULAR            Patient Instructions   COVID and flu tests today were negative.  Ipratropium bromide nasal spray for congestion.  Promethazine DM as needed for cough.  Recommend over the counter oral antihistamine (zyrtec, allegra, claritin) to help with congestion and sinus pressure.  If not allergic, take Tylenol (Acetaminophen) 650 mg to  1 g every 6 hours as needed for fever as directed for control of pain and/or fever  Please drink plenty of fluids.  Please get plenty of rest.  Nasal irrigation with a saline spray or Netti Pot like device per their directions is also recommended.  If you smoke, please stop smoking.    To help ease a sore throat, you can:  Use a sore throat spray.  Suck on hard candy or throat lozenges.  Gargle with warm saltwater a few times each day. Mix of 1/4 teaspoon (1.25 grams) salt in 8 ounces (240 mL) of warm water.  Use a cool mist humidifier to help you breathe easier.    Discussed prescriptions and over-the-counter medicines to help with patient's symptoms:  An antihistamine (loratadine,zyrtec,allegra, xyzal) can help with itching, sneezing, or runny nose.  An antihistamine eye drop can help with itchy eyes.  Medications that control cough are  suppressants and expectorants. Suppressants are tessalon pearls and dextromethorphan. If you have a productive cough with sputum, you need an expectorant called guaifenesin. Dextromethorphan and Guaifenesin are active ingredients in many OTC cough/cold medications such as Dayquil/Nyquil, Mucinex, and Robitussin Mucus+Chest Congestion.            Common Cold Medicine Ingredients Cheat sheet  Acetaminophen (APAP) -pain reliever/fever reducer  Dextromethorphan - cough suppressant  Guaifenesin - expectorant/thins and loosens mucus  Phenylephrine - nasal decongestant  Diphenhydramine or Doxylamine succinate - antihistamine, helps you fall asleep  Promethazine or Brompheniramine - Prescription strength antihistamines    Please remember that you have received care at an urgent care today. Urgent cares are not emergency rooms and are not equipped to handle life threatening emergencies and cannot rule in or out certain medical conditions and you may be released before all of your medical problems are known or treated.     Please arrange follow up with your primary care physician or speciality clinic within 2-5 days if your signs and symptoms have not resolved or worsen.     Patient can call our Referral Hotline at [phone] to make an appointment.    Please return here or go to the Emergency Department for any concerns or worsening of condition.  Signs of infection. These include a fever of 100.4°F (38°C) or higher, chills, cough, more sputum or change in color of sputum.  You are having so much trouble breathing that you can only say one or two words at a time.  You need to sit upright at all times to be able to breathe and or cannot lie down.  You have trouble breathing when talking or sitting still.  You have a fever of 100.4°F (38°C) or higher or chills.  You have chest pain when you cough, have trouble breathing but can still talk in full sentences, or cough up blood.

## 2025-05-29 NOTE — PATIENT INSTRUCTIONS
COVID and flu tests today were negative.  Ipratropium bromide nasal spray for congestion.  Promethazine DM as needed for cough.  Recommend over the counter oral antihistamine (zyrtec, allegra, claritin) to help with congestion and sinus pressure.  If not allergic, take Tylenol (Acetaminophen) 650 mg to  1 g every 6 hours as needed for fever as directed for control of pain and/or fever  Please drink plenty of fluids.  Please get plenty of rest.  Nasal irrigation with a saline spray or Netti Pot like device per their directions is also recommended.  If you smoke, please stop smoking.    To help ease a sore throat, you can:  Use a sore throat spray.  Suck on hard candy or throat lozenges.  Gargle with warm saltwater a few times each day. Mix of 1/4 teaspoon (1.25 grams) salt in 8 ounces (240 mL) of warm water.  Use a cool mist humidifier to help you breathe easier.    Discussed prescriptions and over-the-counter medicines to help with patient's symptoms:  An antihistamine (loratadine,zyrtec,allegra, xyzal) can help with itching, sneezing, or runny nose.  An antihistamine eye drop can help with itchy eyes.  Medications that control cough are suppressants and expectorants. Suppressants are tessalon pearls and dextromethorphan. If you have a productive cough with sputum, you need an expectorant called guaifenesin. Dextromethorphan and Guaifenesin are active ingredients in many OTC cough/cold medications such as Dayquil/Nyquil, Mucinex, and Robitussin Mucus+Chest Congestion.            Common Cold Medicine Ingredients Cheat sheet  Acetaminophen (APAP) -pain reliever/fever reducer  Dextromethorphan - cough suppressant  Guaifenesin - expectorant/thins and loosens mucus  Phenylephrine - nasal decongestant  Diphenhydramine or Doxylamine succinate - antihistamine, helps you fall asleep  Promethazine or Brompheniramine - Prescription strength antihistamines    Please remember that you have received care at an urgent care today.  Urgent cares are not emergency rooms and are not equipped to handle life threatening emergencies and cannot rule in or out certain medical conditions and you may be released before all of your medical problems are known or treated.     Please arrange follow up with your primary care physician or speciality clinic within 2-5 days if your signs and symptoms have not resolved or worsen.     Patient can call our Referral Hotline at [phone] to make an appointment.    Please return here or go to the Emergency Department for any concerns or worsening of condition.  Signs of infection. These include a fever of 100.4°F (38°C) or higher, chills, cough, more sputum or change in color of sputum.  You are having so much trouble breathing that you can only say one or two words at a time.  You need to sit upright at all times to be able to breathe and or cannot lie down.  You have trouble breathing when talking or sitting still.  You have a fever of 100.4°F (38°C) or higher or chills.  You have chest pain when you cough, have trouble breathing but can still talk in full sentences, or cough up blood.

## (undated) DEVICE — APPLICATOR CHLORAPREP ORN 26ML

## (undated) DEVICE — SEE MEDLINE ITEM 157131

## (undated) DEVICE — SUT VICRYL+ 1 CTX 18IN VIOL

## (undated) DEVICE — STAPLER SKIN PROXIMATE WIDE

## (undated) DEVICE — SUT VICRYL PLUS 3-0 SH 18IN

## (undated) DEVICE — SYR SLIP TIP 1CC

## (undated) DEVICE — DRESSING TRANS 4X10 TEGADERM

## (undated) DEVICE — CONTAINER SPECIMEN STRL 4OZ

## (undated) DEVICE — KIT EVACUATOR 3-SPRING 1/8 DRN

## (undated) DEVICE — CLOSURE SKIN STERI STRIP 1/2X4

## (undated) DEVICE — GLOVE BIOGEL SKINSENSE PI 7.5

## (undated) DEVICE — DRESSING AQUACEL ADH 4X10IN

## (undated) DEVICE — SUT VICRYL PLUS 4-0 P3 18IN

## (undated) DEVICE — Device

## (undated) DEVICE — GLOVE BIOGEL SKINSENSE PI 8.0

## (undated) DEVICE — DRESSING TRANS 4X4 TEGADERM

## (undated) DEVICE — SOL BETADINE 5%

## (undated) DEVICE — BLADE SURG BVL ANG COAX 2.4MM

## (undated) DEVICE — DRESSING LEUKOPLAST FLEX 1X3IN

## (undated) DEVICE — PILLOW ABDUCTION MED

## (undated) DEVICE — TAPE MEDIPORE 3 X 10YD

## (undated) DEVICE — DRAPE STERI U-SHAPED 47X51IN

## (undated) DEVICE — UNDERGLOVES BIOGEL PI SZ 7 LF

## (undated) DEVICE — COVER MAYO STAND REINFRCD 30

## (undated) DEVICE — GAUZE SPONGE 4X4 12PLY

## (undated) DEVICE — SHEILD & GARTERS FOX METAL EYE

## (undated) DEVICE — DRAPE INCISE IOBAN 2 23X17IN

## (undated) DEVICE — SYR 30CC LUER LOCK

## (undated) DEVICE — SHIELD EYE METAL FOX 50/BX

## (undated) DEVICE — COVER BACK TABLE 72X21

## (undated) DEVICE — SUT ETHIBOND EXCEL 5-0 V-40

## (undated) DEVICE — ELECTRODE REM PLYHSV RETURN 9

## (undated) DEVICE — DRESSING AQUACEL AG 3.5X10IN

## (undated) DEVICE — ALCOHOL 70% ISOP W/GREEN 16OZ

## (undated) DEVICE — POSITIONER IV ARMBOARD FOAM

## (undated) DEVICE — SUT VICRYL PLUS 2-0 CT1 18

## (undated) DEVICE — SHEET HIP ABSORB CIRC ELAS 8

## (undated) DEVICE — SOL IRR STRL WATER 500ML

## (undated) DEVICE — DRAPE STERI INSTRUMENT 1018

## (undated) DEVICE — SEE MEDLINE ITEM 153151

## (undated) DEVICE — SEE MEDLINE ITEM 157117

## (undated) DEVICE — SUT VICRYL 3-0 27 SH

## (undated) DEVICE — SUT VICRYL PLUS 3-0 18IN

## (undated) DEVICE — UNDERGLOVE BIOGEL PI SZ 6.5 LF

## (undated) DEVICE — CASSETTE INFINITI

## (undated) DEVICE — SEE MEDLINE ITEM 161288

## (undated) DEVICE — GLOVE BIOGEL SKINSENSE PI 8.5

## (undated) DEVICE — HOOD T-5 TEAR AWAY STERILE

## (undated) DEVICE — PAD ABD 8X10 STERILE

## (undated) DEVICE — GLOVE BIOGEL SKINSENSE PI 7.0

## (undated) DEVICE — DRESSING N ADH OIL EMUL 3X8 3S

## (undated) DEVICE — SEE MEDLINE ITEM 156930

## (undated) DEVICE — SOL 9P NACL IRR PIC IL

## (undated) DEVICE — NDL HYPO REG 25G X 1 1/2

## (undated) DEVICE — NDL SAFETY 22G X 1.5 ECLIPSE

## (undated) DEVICE — ELECTRODE BLD EXT INSUL 1

## (undated) DEVICE — GAUZE SPONGE 4'X4 12 PLY

## (undated) DEVICE — UNDERGLOVES BIOGEL PI SIZE 8.5

## (undated) DEVICE — SOL IRR NACL .9% 3000ML

## (undated) DEVICE — DRAPE CAMERA/VIDEO 5 X 96

## (undated) DEVICE — BLADE SAG DUAL 18MMX1.27MMX90M

## (undated) DEVICE — SUT STRATAFIX PDS 1 CTX 18IN

## (undated) DEVICE — UNDERGLOVES BIOGEL PI SIZE 7.5

## (undated) DEVICE — SYR B-D DISP CONTROL 10CC100/C

## (undated) DEVICE — GOWN SMART IMP BREATHABLE XXLG

## (undated) DEVICE — PULSAVAC ZIMMER

## (undated) DEVICE — SPONGE GAUZE 16PLY 4X4

## (undated) DEVICE — TRAY FOLEY 16FR INFECTION CONT